# Patient Record
Sex: FEMALE | Race: WHITE | NOT HISPANIC OR LATINO | Employment: UNEMPLOYED | URBAN - NONMETROPOLITAN AREA
[De-identification: names, ages, dates, MRNs, and addresses within clinical notes are randomized per-mention and may not be internally consistent; named-entity substitution may affect disease eponyms.]

---

## 2018-11-16 ENCOUNTER — TRANSCRIBE ORDERS (OUTPATIENT)
Dept: ADMINISTRATIVE | Facility: HOSPITAL | Age: 53
End: 2018-11-16

## 2018-11-16 DIAGNOSIS — Z12.39 SCREENING BREAST EXAMINATION: Primary | ICD-10-CM

## 2018-11-28 ENCOUNTER — TELEPHONE (OUTPATIENT)
Dept: SURGERY | Facility: CLINIC | Age: 53
End: 2018-11-28

## 2018-11-30 NOTE — TELEPHONE ENCOUNTER
"I called pt to schedule an open access screening colonoscopy, she stated \"I am not ready to do that right now, I will call you when I am ready, you don't have to call me back.\"  I called Maggi Talamantes's office to inform her regarding my conversation with Ms. Calderon and I will await pt's call.  "

## 2021-01-29 ENCOUNTER — HOSPITAL ENCOUNTER (EMERGENCY)
Facility: HOSPITAL | Age: 56
Discharge: ANOTHER HOSPITAL | End: 2021-01-29
Attending: EMERGENCY MEDICINE | Admitting: EMERGENCY MEDICINE

## 2021-01-29 ENCOUNTER — APPOINTMENT (OUTPATIENT)
Dept: GENERAL RADIOLOGY | Facility: HOSPITAL | Age: 56
End: 2021-01-29

## 2021-01-29 ENCOUNTER — APPOINTMENT (OUTPATIENT)
Dept: CT IMAGING | Facility: HOSPITAL | Age: 56
End: 2021-01-29

## 2021-01-29 ENCOUNTER — HOSPITAL ENCOUNTER (INPATIENT)
Facility: HOSPITAL | Age: 56
LOS: 7 days | Discharge: NURSING, REHAB OR LONG-TERM CARE | End: 2021-02-05
Attending: INTERNAL MEDICINE | Admitting: INTERNAL MEDICINE

## 2021-01-29 VITALS
RESPIRATION RATE: 22 BRPM | OXYGEN SATURATION: 95 % | HEART RATE: 95 BPM | HEIGHT: 62 IN | DIASTOLIC BLOOD PRESSURE: 73 MMHG | SYSTOLIC BLOOD PRESSURE: 143 MMHG | TEMPERATURE: 99.9 F | BODY MASS INDEX: 23.92 KG/M2 | WEIGHT: 130 LBS

## 2021-01-29 DIAGNOSIS — Z74.09 IMPAIRED FUNCTIONAL MOBILITY, BALANCE, GAIT, AND ENDURANCE: ICD-10-CM

## 2021-01-29 DIAGNOSIS — I61.9 HEMORRHAGIC STROKE (HCC): Primary | ICD-10-CM

## 2021-01-29 DIAGNOSIS — R41.841 COGNITIVE COMMUNICATION DEFICIT: ICD-10-CM

## 2021-01-29 DIAGNOSIS — R13.12 OROPHARYNGEAL DYSPHAGIA: ICD-10-CM

## 2021-01-29 DIAGNOSIS — I61.1 NONTRAUMATIC CORTICAL HEMORRHAGE OF CEREBRAL HEMISPHERE, UNSPECIFIED LATERALITY (HCC): Primary | ICD-10-CM

## 2021-01-29 PROBLEM — D72.829 LEUKOCYTOSIS: Status: ACTIVE | Noted: 2021-01-29

## 2021-01-29 LAB
ALBUMIN SERPL-MCNC: 4.1 G/DL (ref 3.5–5.2)
ALBUMIN/GLOB SERPL: 1.1 G/DL
ALP SERPL-CCNC: 101 U/L (ref 39–117)
ALT SERPL W P-5'-P-CCNC: 17 U/L (ref 1–33)
AMPHET+METHAMPHET UR QL: NEGATIVE
AMPHETAMINES UR QL: NEGATIVE
ANION GAP SERPL CALCULATED.3IONS-SCNC: 11.4 MMOL/L (ref 5–15)
APTT PPP: 29.2 SECONDS (ref 24–37)
APTT PPP: 30.5 SECONDS (ref 24.5–37.2)
AST SERPL-CCNC: 19 U/L (ref 1–32)
BARBITURATES UR QL SCN: NEGATIVE
BASOPHILS # BLD AUTO: 0.04 10*3/MM3 (ref 0–0.2)
BASOPHILS NFR BLD AUTO: 0.3 % (ref 0–1.5)
BENZODIAZ UR QL SCN: NEGATIVE
BILIRUB SERPL-MCNC: 0.4 MG/DL (ref 0–1.2)
BUN SERPL-MCNC: 9 MG/DL (ref 6–20)
BUN/CREAT SERPL: 13.4 (ref 7–25)
BUPRENORPHINE SERPL-MCNC: NEGATIVE NG/ML
CALCIUM SPEC-SCNC: 9.7 MG/DL (ref 8.6–10.5)
CANNABINOIDS SERPL QL: NEGATIVE
CHLORIDE SERPL-SCNC: 105 MMOL/L (ref 98–107)
CK SERPL-CCNC: 56 U/L (ref 20–180)
CO2 SERPL-SCNC: 26.6 MMOL/L (ref 22–29)
COCAINE UR QL: NEGATIVE
CREAT SERPL-MCNC: 0.67 MG/DL (ref 0.57–1)
DEPRECATED RDW RBC AUTO: 43 FL (ref 37–54)
EOSINOPHIL # BLD AUTO: 0.02 10*3/MM3 (ref 0–0.4)
EOSINOPHIL NFR BLD AUTO: 0.1 % (ref 0.3–6.2)
ERYTHROCYTE [DISTWIDTH] IN BLOOD BY AUTOMATED COUNT: 13.3 % (ref 12.3–15.4)
ETHANOL BLD-MCNC: <10 MG/DL (ref 0–10)
ETHANOL UR QL: <0.01 %
GFR SERPL CREATININE-BSD FRML MDRD: 91 ML/MIN/1.73
GLOBULIN UR ELPH-MCNC: 3.6 GM/DL
GLUCOSE BLDC GLUCOMTR-MCNC: 111 MG/DL (ref 70–130)
GLUCOSE SERPL-MCNC: 126 MG/DL (ref 65–99)
HCT VFR BLD AUTO: 45.1 % (ref 34–46.6)
HGB BLD-MCNC: 15.3 G/DL (ref 12–15.9)
HOLD SPECIMEN: NORMAL
HOLD SPECIMEN: NORMAL
IMM GRANULOCYTES # BLD AUTO: 0.09 10*3/MM3 (ref 0–0.05)
IMM GRANULOCYTES NFR BLD AUTO: 0.6 % (ref 0–0.5)
INR PPP: 0.97 (ref 0.9–1.1)
INR PPP: 1.12 (ref 0.85–1.16)
LYMPHOCYTES # BLD AUTO: 2.78 10*3/MM3 (ref 0.7–3.1)
LYMPHOCYTES NFR BLD AUTO: 20 % (ref 19.6–45.3)
MAGNESIUM SERPL-MCNC: 2.3 MG/DL (ref 1.6–2.6)
MCH RBC QN AUTO: 29.5 PG (ref 26.6–33)
MCHC RBC AUTO-ENTMCNC: 33.9 G/DL (ref 31.5–35.7)
MCV RBC AUTO: 87.1 FL (ref 79–97)
METHADONE UR QL SCN: NEGATIVE
MONOCYTES # BLD AUTO: 0.72 10*3/MM3 (ref 0.1–0.9)
MONOCYTES NFR BLD AUTO: 5.2 % (ref 5–12)
MYOGLOBIN SERPL-MCNC: 21.8 NG/ML (ref 25–58)
NEUTROPHILS NFR BLD AUTO: 10.27 10*3/MM3 (ref 1.7–7)
NEUTROPHILS NFR BLD AUTO: 73.8 % (ref 42.7–76)
NRBC BLD AUTO-RTO: 0 /100 WBC (ref 0–0.2)
OPIATES UR QL: NEGATIVE
OXYCODONE UR QL SCN: NEGATIVE
PCP UR QL SCN: NEGATIVE
PLATELET # BLD AUTO: 359 10*3/MM3 (ref 140–450)
PMV BLD AUTO: 10.4 FL (ref 6–12)
POTASSIUM SERPL-SCNC: 4.3 MMOL/L (ref 3.5–5.2)
POTASSIUM SERPL-SCNC: 4.4 MMOL/L (ref 3.5–5.2)
PROPOXYPH UR QL: NEGATIVE
PROT SERPL-MCNC: 7.7 G/DL (ref 6–8.5)
PROTHROMBIN TIME: 13.2 SECONDS (ref 12–15.1)
PROTHROMBIN TIME: 14.2 SECONDS (ref 11.5–14)
RBC # BLD AUTO: 5.18 10*6/MM3 (ref 3.77–5.28)
SODIUM SERPL-SCNC: 143 MMOL/L (ref 136–145)
TRICYCLICS UR QL SCN: NEGATIVE
TROPONIN T SERPL-MCNC: <0.01 NG/ML (ref 0–0.03)
WBC # BLD AUTO: 13.92 10*3/MM3 (ref 3.4–10.8)
WHOLE BLOOD HOLD SPECIMEN: NORMAL
WHOLE BLOOD HOLD SPECIMEN: NORMAL

## 2021-01-29 PROCEDURE — 85610 PROTHROMBIN TIME: CPT | Performed by: EMERGENCY MEDICINE

## 2021-01-29 PROCEDURE — 70450 CT HEAD/BRAIN W/O DYE: CPT

## 2021-01-29 PROCEDURE — 82962 GLUCOSE BLOOD TEST: CPT

## 2021-01-29 PROCEDURE — 96365 THER/PROPH/DIAG IV INF INIT: CPT

## 2021-01-29 PROCEDURE — 83874 ASSAY OF MYOGLOBIN: CPT | Performed by: EMERGENCY MEDICINE

## 2021-01-29 PROCEDURE — 93005 ELECTROCARDIOGRAM TRACING: CPT | Performed by: EMERGENCY MEDICINE

## 2021-01-29 PROCEDURE — 82550 ASSAY OF CK (CPK): CPT | Performed by: EMERGENCY MEDICINE

## 2021-01-29 PROCEDURE — 84132 ASSAY OF SERUM POTASSIUM: CPT | Performed by: INTERNAL MEDICINE

## 2021-01-29 PROCEDURE — 70496 CT ANGIOGRAPHY HEAD: CPT

## 2021-01-29 PROCEDURE — 83735 ASSAY OF MAGNESIUM: CPT | Performed by: INTERNAL MEDICINE

## 2021-01-29 PROCEDURE — 71045 X-RAY EXAM CHEST 1 VIEW: CPT

## 2021-01-29 PROCEDURE — 80053 COMPREHEN METABOLIC PANEL: CPT | Performed by: EMERGENCY MEDICINE

## 2021-01-29 PROCEDURE — 82077 ASSAY SPEC XCP UR&BREATH IA: CPT | Performed by: EMERGENCY MEDICINE

## 2021-01-29 PROCEDURE — 25010000002 DEXAMETHASONE PER 1 MG: Performed by: PHYSICIAN ASSISTANT

## 2021-01-29 PROCEDURE — 85025 COMPLETE CBC W/AUTO DIFF WBC: CPT | Performed by: EMERGENCY MEDICINE

## 2021-01-29 PROCEDURE — 84484 ASSAY OF TROPONIN QUANT: CPT | Performed by: EMERGENCY MEDICINE

## 2021-01-29 PROCEDURE — 99284 EMERGENCY DEPT VISIT MOD MDM: CPT

## 2021-01-29 PROCEDURE — 85730 THROMBOPLASTIN TIME PARTIAL: CPT | Performed by: INTERNAL MEDICINE

## 2021-01-29 PROCEDURE — 70498 CT ANGIOGRAPHY NECK: CPT

## 2021-01-29 PROCEDURE — 80306 DRUG TEST PRSMV INSTRMNT: CPT | Performed by: INTERNAL MEDICINE

## 2021-01-29 PROCEDURE — 85610 PROTHROMBIN TIME: CPT | Performed by: INTERNAL MEDICINE

## 2021-01-29 PROCEDURE — 85730 THROMBOPLASTIN TIME PARTIAL: CPT | Performed by: EMERGENCY MEDICINE

## 2021-01-29 PROCEDURE — 99222 1ST HOSP IP/OBS MODERATE 55: CPT | Performed by: PHYSICIAN ASSISTANT

## 2021-01-29 PROCEDURE — 25010000003 LEVETIRACETAM IN NACL 0.75% 1000 MG/100ML SOLUTION: Performed by: PHYSICIAN ASSISTANT

## 2021-01-29 PROCEDURE — 0 IOPAMIDOL PER 1 ML: Performed by: INTERNAL MEDICINE

## 2021-01-29 PROCEDURE — 99291 CRITICAL CARE FIRST HOUR: CPT | Performed by: INTERNAL MEDICINE

## 2021-01-29 RX ORDER — FAMOTIDINE 10 MG/ML
20 INJECTION, SOLUTION INTRAVENOUS EVERY 12 HOURS SCHEDULED
Status: DISCONTINUED | OUTPATIENT
Start: 2021-01-29 | End: 2021-02-03

## 2021-01-29 RX ORDER — MAGNESIUM SULFATE HEPTAHYDRATE 40 MG/ML
2 INJECTION, SOLUTION INTRAVENOUS AS NEEDED
Status: DISCONTINUED | OUTPATIENT
Start: 2021-01-29 | End: 2021-02-05 | Stop reason: HOSPADM

## 2021-01-29 RX ORDER — DEXAMETHASONE SODIUM PHOSPHATE 4 MG/ML
4 INJECTION, SOLUTION INTRA-ARTICULAR; INTRALESIONAL; INTRAMUSCULAR; INTRAVENOUS; SOFT TISSUE EVERY 6 HOURS SCHEDULED
Status: DISCONTINUED | OUTPATIENT
Start: 2021-01-29 | End: 2021-01-31

## 2021-01-29 RX ORDER — MAGNESIUM SULFATE HEPTAHYDRATE 40 MG/ML
4 INJECTION, SOLUTION INTRAVENOUS AS NEEDED
Status: DISCONTINUED | OUTPATIENT
Start: 2021-01-29 | End: 2021-02-05 | Stop reason: HOSPADM

## 2021-01-29 RX ORDER — SODIUM CHLORIDE 0.9 % (FLUSH) 0.9 %
10 SYRINGE (ML) INJECTION AS NEEDED
Status: DISCONTINUED | OUTPATIENT
Start: 2021-01-29 | End: 2021-01-29 | Stop reason: HOSPADM

## 2021-01-29 RX ORDER — LEVETIRACETAM 10 MG/ML
1000 INJECTION INTRAVASCULAR EVERY 12 HOURS SCHEDULED
Status: DISCONTINUED | OUTPATIENT
Start: 2021-01-29 | End: 2021-01-31

## 2021-01-29 RX ORDER — SODIUM CHLORIDE 0.9 % (FLUSH) 0.9 %
10 SYRINGE (ML) INJECTION EVERY 12 HOURS SCHEDULED
Status: CANCELLED | OUTPATIENT
Start: 2021-01-29

## 2021-01-29 RX ORDER — POTASSIUM CHLORIDE 7.45 MG/ML
10 INJECTION INTRAVENOUS
Status: DISCONTINUED | OUTPATIENT
Start: 2021-01-29 | End: 2021-02-05 | Stop reason: HOSPADM

## 2021-01-29 RX ORDER — SODIUM CHLORIDE 0.9 % (FLUSH) 0.9 %
10 SYRINGE (ML) INJECTION AS NEEDED
Status: CANCELLED | OUTPATIENT
Start: 2021-01-29

## 2021-01-29 RX ADMIN — IOPAMIDOL 75 ML: 755 INJECTION, SOLUTION INTRAVENOUS at 21:45

## 2021-01-29 RX ADMIN — NICARDIPINE HYDROCHLORIDE 5 MG/HR: 0.1 INJECTION, SOLUTION INTRAVENOUS at 21:00

## 2021-01-29 RX ADMIN — NICARDIPINE HYDROCHLORIDE 5 MG/HR: 0.1 INJECTION, SOLUTION INTRAVENOUS at 18:40

## 2021-01-29 RX ADMIN — DEXAMETHASONE SODIUM PHOSPHATE 4 MG: 4 INJECTION, SOLUTION INTRA-ARTICULAR; INTRALESIONAL; INTRAMUSCULAR; INTRAVENOUS; SOFT TISSUE at 23:45

## 2021-01-29 RX ADMIN — FAMOTIDINE 20 MG: 10 INJECTION INTRAVENOUS at 21:25

## 2021-01-29 RX ADMIN — LEVETIRACETAM 1000 MG: 10 INJECTION INTRAVASCULAR at 23:45

## 2021-01-30 ENCOUNTER — APPOINTMENT (OUTPATIENT)
Dept: MRI IMAGING | Facility: HOSPITAL | Age: 56
End: 2021-01-30

## 2021-01-30 ENCOUNTER — APPOINTMENT (OUTPATIENT)
Dept: CT IMAGING | Facility: HOSPITAL | Age: 56
End: 2021-01-30

## 2021-01-30 PROBLEM — U07.1 COVID-19 VIRUS DETECTED: Status: ACTIVE | Noted: 2021-01-30

## 2021-01-30 LAB
CHOLEST SERPL-MCNC: 210 MG/DL (ref 0–200)
GLUCOSE BLDC GLUCOMTR-MCNC: 126 MG/DL (ref 70–130)
GLUCOSE BLDC GLUCOMTR-MCNC: 137 MG/DL (ref 70–130)
GLUCOSE BLDC GLUCOMTR-MCNC: 147 MG/DL (ref 70–130)
HBA1C MFR BLD: 6 % (ref 4.8–5.6)
HDLC SERPL-MCNC: 58 MG/DL (ref 40–60)
LDLC SERPL CALC-MCNC: 138 MG/DL (ref 0–100)
LDLC/HDLC SERPL: 2.36 {RATIO}
MAGNESIUM SERPL-MCNC: 2.5 MG/DL (ref 1.6–2.6)
POTASSIUM SERPL-SCNC: 3.9 MMOL/L (ref 3.5–5.2)
SARS-COV-2 RNA RESP QL NAA+PROBE: DETECTED
TRIGL SERPL-MCNC: 76 MG/DL (ref 0–150)
VLDLC SERPL-MCNC: 14 MG/DL (ref 5–40)

## 2021-01-30 PROCEDURE — 70553 MRI BRAIN STEM W/O & W/DYE: CPT

## 2021-01-30 PROCEDURE — 80061 LIPID PANEL: CPT | Performed by: INTERNAL MEDICINE

## 2021-01-30 PROCEDURE — 71250 CT THORAX DX C-: CPT

## 2021-01-30 PROCEDURE — 97110 THERAPEUTIC EXERCISES: CPT

## 2021-01-30 PROCEDURE — 74150 CT ABDOMEN W/O CONTRAST: CPT

## 2021-01-30 PROCEDURE — 25010000002 DEXAMETHASONE PER 1 MG: Performed by: PHYSICIAN ASSISTANT

## 2021-01-30 PROCEDURE — 25010000003 LEVETIRACETAM IN NACL 0.75% 1000 MG/100ML SOLUTION: Performed by: PHYSICIAN ASSISTANT

## 2021-01-30 PROCEDURE — 83036 HEMOGLOBIN GLYCOSYLATED A1C: CPT | Performed by: INTERNAL MEDICINE

## 2021-01-30 PROCEDURE — 97166 OT EVAL MOD COMPLEX 45 MIN: CPT

## 2021-01-30 PROCEDURE — 83735 ASSAY OF MAGNESIUM: CPT | Performed by: INTERNAL MEDICINE

## 2021-01-30 PROCEDURE — 82962 GLUCOSE BLOOD TEST: CPT

## 2021-01-30 PROCEDURE — 84132 ASSAY OF SERUM POTASSIUM: CPT | Performed by: INTERNAL MEDICINE

## 2021-01-30 PROCEDURE — 99231 SBSQ HOSP IP/OBS SF/LOW 25: CPT | Performed by: PHYSICIAN ASSISTANT

## 2021-01-30 PROCEDURE — U0003 INFECTIOUS AGENT DETECTION BY NUCLEIC ACID (DNA OR RNA); SEVERE ACUTE RESPIRATORY SYNDROME CORONAVIRUS 2 (SARS-COV-2) (CORONAVIRUS DISEASE [COVID-19]), AMPLIFIED PROBE TECHNIQUE, MAKING USE OF HIGH THROUGHPUT TECHNOLOGIES AS DESCRIBED BY CMS-2020-01-R: HCPCS | Performed by: INTERNAL MEDICINE

## 2021-01-30 PROCEDURE — 70450 CT HEAD/BRAIN W/O DYE: CPT

## 2021-01-30 PROCEDURE — 97162 PT EVAL MOD COMPLEX 30 MIN: CPT

## 2021-01-30 PROCEDURE — 99232 SBSQ HOSP IP/OBS MODERATE 35: CPT | Performed by: INTERNAL MEDICINE

## 2021-01-30 RX ORDER — SODIUM CHLORIDE 450 MG/100ML
75 INJECTION, SOLUTION INTRAVENOUS CONTINUOUS
Status: DISCONTINUED | OUTPATIENT
Start: 2021-01-30 | End: 2021-02-02

## 2021-01-30 RX ORDER — ACETAMINOPHEN 650 MG/1
650 SUPPOSITORY RECTAL EVERY 4 HOURS PRN
Status: DISCONTINUED | OUTPATIENT
Start: 2021-01-30 | End: 2021-02-05 | Stop reason: HOSPADM

## 2021-01-30 RX ADMIN — DEXAMETHASONE SODIUM PHOSPHATE 4 MG: 4 INJECTION, SOLUTION INTRA-ARTICULAR; INTRALESIONAL; INTRAMUSCULAR; INTRAVENOUS; SOFT TISSUE at 17:37

## 2021-01-30 RX ADMIN — LEVETIRACETAM 1000 MG: 10 INJECTION INTRAVASCULAR at 08:45

## 2021-01-30 RX ADMIN — NICARDIPINE HYDROCHLORIDE 5 MG/HR: 0.1 INJECTION, SOLUTION INTRAVENOUS at 01:08

## 2021-01-30 RX ADMIN — SODIUM CHLORIDE 75 ML/HR: 4.5 INJECTION, SOLUTION INTRAVENOUS at 17:44

## 2021-01-30 RX ADMIN — LEVETIRACETAM 1000 MG: 10 INJECTION INTRAVASCULAR at 21:20

## 2021-01-30 RX ADMIN — FAMOTIDINE 20 MG: 10 INJECTION INTRAVENOUS at 21:21

## 2021-01-30 RX ADMIN — DEXAMETHASONE SODIUM PHOSPHATE 4 MG: 4 INJECTION, SOLUTION INTRA-ARTICULAR; INTRALESIONAL; INTRAMUSCULAR; INTRAVENOUS; SOFT TISSUE at 12:43

## 2021-01-30 RX ADMIN — NICARDIPINE HYDROCHLORIDE 2.5 MG/HR: 0.1 INJECTION, SOLUTION INTRAVENOUS at 21:20

## 2021-01-30 RX ADMIN — DEXAMETHASONE SODIUM PHOSPHATE 4 MG: 4 INJECTION, SOLUTION INTRA-ARTICULAR; INTRALESIONAL; INTRAMUSCULAR; INTRAVENOUS; SOFT TISSUE at 05:45

## 2021-01-30 RX ADMIN — FAMOTIDINE 20 MG: 10 INJECTION INTRAVENOUS at 08:45

## 2021-01-30 RX ADMIN — NICARDIPINE HYDROCHLORIDE 12.5 MG/HR: 0.1 INJECTION, SOLUTION INTRAVENOUS at 02:27

## 2021-01-30 RX ADMIN — ACETAMINOPHEN 650 MG: 650 SUPPOSITORY RECTAL at 17:37

## 2021-01-30 RX ADMIN — NICARDIPINE HYDROCHLORIDE 7.5 MG/HR: 0.1 INJECTION, SOLUTION INTRAVENOUS at 05:45

## 2021-01-31 ENCOUNTER — APPOINTMENT (OUTPATIENT)
Dept: MRI IMAGING | Facility: HOSPITAL | Age: 56
End: 2021-01-31

## 2021-01-31 LAB
ALBUMIN SERPL-MCNC: 4 G/DL (ref 3.5–5.2)
ANION GAP SERPL CALCULATED.3IONS-SCNC: 9 MMOL/L (ref 5–15)
BASOPHILS # BLD AUTO: 0.02 10*3/MM3 (ref 0–0.2)
BASOPHILS NFR BLD AUTO: 0.1 % (ref 0–1.5)
BUN SERPL-MCNC: 23 MG/DL (ref 6–20)
BUN/CREAT SERPL: 40.4 (ref 7–25)
CALCIUM SPEC-SCNC: 9.4 MG/DL (ref 8.6–10.5)
CHLORIDE SERPL-SCNC: 106 MMOL/L (ref 98–107)
CO2 SERPL-SCNC: 24 MMOL/L (ref 22–29)
CREAT SERPL-MCNC: 0.57 MG/DL (ref 0.57–1)
DEPRECATED RDW RBC AUTO: 45.1 FL (ref 37–54)
EOSINOPHIL # BLD AUTO: 0 10*3/MM3 (ref 0–0.4)
EOSINOPHIL NFR BLD AUTO: 0 % (ref 0.3–6.2)
ERYTHROCYTE [DISTWIDTH] IN BLOOD BY AUTOMATED COUNT: 13.5 % (ref 12.3–15.4)
GFR SERPL CREATININE-BSD FRML MDRD: 110 ML/MIN/1.73
GLUCOSE BLDC GLUCOMTR-MCNC: 119 MG/DL (ref 70–130)
GLUCOSE BLDC GLUCOMTR-MCNC: 128 MG/DL (ref 70–130)
GLUCOSE SERPL-MCNC: 131 MG/DL (ref 65–99)
HCT VFR BLD AUTO: 49 % (ref 34–46.6)
HGB BLD-MCNC: 15.9 G/DL (ref 12–15.9)
IMM GRANULOCYTES # BLD AUTO: 0.14 10*3/MM3 (ref 0–0.05)
IMM GRANULOCYTES NFR BLD AUTO: 1 % (ref 0–0.5)
LYMPHOCYTES # BLD AUTO: 2.3 10*3/MM3 (ref 0.7–3.1)
LYMPHOCYTES NFR BLD AUTO: 16.6 % (ref 19.6–45.3)
MAGNESIUM SERPL-MCNC: 2.4 MG/DL (ref 1.6–2.6)
MCH RBC QN AUTO: 29.4 PG (ref 26.6–33)
MCHC RBC AUTO-ENTMCNC: 32.4 G/DL (ref 31.5–35.7)
MCV RBC AUTO: 90.7 FL (ref 79–97)
MONOCYTES # BLD AUTO: 0.5 10*3/MM3 (ref 0.1–0.9)
MONOCYTES NFR BLD AUTO: 3.6 % (ref 5–12)
NEUTROPHILS NFR BLD AUTO: 10.9 10*3/MM3 (ref 1.7–7)
NEUTROPHILS NFR BLD AUTO: 78.7 % (ref 42.7–76)
NRBC BLD AUTO-RTO: 0 /100 WBC (ref 0–0.2)
PHOSPHATE SERPL-MCNC: 3.2 MG/DL (ref 2.5–4.5)
PLATELET # BLD AUTO: 329 10*3/MM3 (ref 140–450)
PMV BLD AUTO: 10.4 FL (ref 6–12)
POTASSIUM SERPL-SCNC: 3.7 MMOL/L (ref 3.5–5.2)
RBC # BLD AUTO: 5.4 10*6/MM3 (ref 3.77–5.28)
SODIUM SERPL-SCNC: 139 MMOL/L (ref 136–145)
WBC # BLD AUTO: 13.86 10*3/MM3 (ref 3.4–10.8)

## 2021-01-31 PROCEDURE — 25010000002 LEVETIRACETAM IN NACL 0.82% 500 MG/100ML SOLUTION: Performed by: NEUROLOGICAL SURGERY

## 2021-01-31 PROCEDURE — 82962 GLUCOSE BLOOD TEST: CPT

## 2021-01-31 PROCEDURE — 97530 THERAPEUTIC ACTIVITIES: CPT

## 2021-01-31 PROCEDURE — A9577 INJ MULTIHANCE: HCPCS | Performed by: INTERNAL MEDICINE

## 2021-01-31 PROCEDURE — 99232 SBSQ HOSP IP/OBS MODERATE 35: CPT | Performed by: NEUROLOGICAL SURGERY

## 2021-01-31 PROCEDURE — 0 GADOBENATE DIMEGLUMINE 529 MG/ML SOLUTION: Performed by: INTERNAL MEDICINE

## 2021-01-31 PROCEDURE — 97116 GAIT TRAINING THERAPY: CPT

## 2021-01-31 PROCEDURE — 25010000002 DEXAMETHASONE PER 1 MG: Performed by: PHYSICIAN ASSISTANT

## 2021-01-31 PROCEDURE — 80069 RENAL FUNCTION PANEL: CPT | Performed by: INTERNAL MEDICINE

## 2021-01-31 PROCEDURE — 85025 COMPLETE CBC W/AUTO DIFF WBC: CPT | Performed by: INTERNAL MEDICINE

## 2021-01-31 PROCEDURE — 92610 EVALUATE SWALLOWING FUNCTION: CPT

## 2021-01-31 PROCEDURE — 92523 SPEECH SOUND LANG COMPREHEN: CPT

## 2021-01-31 PROCEDURE — 25010000003 LEVETIRACETAM IN NACL 0.75% 1000 MG/100ML SOLUTION: Performed by: PHYSICIAN ASSISTANT

## 2021-01-31 PROCEDURE — 83735 ASSAY OF MAGNESIUM: CPT | Performed by: INTERNAL MEDICINE

## 2021-01-31 PROCEDURE — 99232 SBSQ HOSP IP/OBS MODERATE 35: CPT | Performed by: INTERNAL MEDICINE

## 2021-01-31 PROCEDURE — 70544 MR ANGIOGRAPHY HEAD W/O DYE: CPT

## 2021-01-31 RX ORDER — LEVETIRACETAM 5 MG/ML
500 INJECTION INTRAVASCULAR EVERY 12 HOURS SCHEDULED
Status: DISCONTINUED | OUTPATIENT
Start: 2021-01-31 | End: 2021-02-03

## 2021-01-31 RX ADMIN — LEVETIRACETAM 1000 MG: 10 INJECTION INTRAVASCULAR at 08:11

## 2021-01-31 RX ADMIN — SODIUM CHLORIDE 75 ML/HR: 4.5 INJECTION, SOLUTION INTRAVENOUS at 06:09

## 2021-01-31 RX ADMIN — FAMOTIDINE 20 MG: 10 INJECTION INTRAVENOUS at 20:13

## 2021-01-31 RX ADMIN — LEVETIRACETAM 500 MG: 5 INJECTION INTRAVASCULAR at 20:13

## 2021-01-31 RX ADMIN — GADOBENATE DIMEGLUMINE 11 ML: 529 INJECTION, SOLUTION INTRAVENOUS at 02:15

## 2021-01-31 RX ADMIN — DEXAMETHASONE SODIUM PHOSPHATE 4 MG: 4 INJECTION, SOLUTION INTRA-ARTICULAR; INTRALESIONAL; INTRAMUSCULAR; INTRAVENOUS; SOFT TISSUE at 06:09

## 2021-01-31 RX ADMIN — NICARDIPINE HYDROCHLORIDE 5 MG/HR: 0.1 INJECTION, SOLUTION INTRAVENOUS at 02:03

## 2021-01-31 RX ADMIN — FAMOTIDINE 20 MG: 10 INJECTION INTRAVENOUS at 08:11

## 2021-01-31 RX ADMIN — NICARDIPINE HYDROCHLORIDE 5 MG/HR: 0.1 INJECTION, SOLUTION INTRAVENOUS at 04:44

## 2021-01-31 RX ADMIN — SODIUM CHLORIDE 75 ML/HR: 4.5 INJECTION, SOLUTION INTRAVENOUS at 20:14

## 2021-01-31 RX ADMIN — DEXAMETHASONE SODIUM PHOSPHATE 4 MG: 4 INJECTION, SOLUTION INTRA-ARTICULAR; INTRALESIONAL; INTRAMUSCULAR; INTRAVENOUS; SOFT TISSUE at 00:00

## 2021-02-01 ENCOUNTER — APPOINTMENT (OUTPATIENT)
Dept: GENERAL RADIOLOGY | Facility: HOSPITAL | Age: 56
End: 2021-02-01

## 2021-02-01 LAB
ALBUMIN SERPL-MCNC: 3.5 G/DL (ref 3.5–5.2)
ANION GAP SERPL CALCULATED.3IONS-SCNC: 9 MMOL/L (ref 5–15)
BASOPHILS # BLD AUTO: 0.02 10*3/MM3 (ref 0–0.2)
BASOPHILS NFR BLD AUTO: 0.1 % (ref 0–1.5)
BUN SERPL-MCNC: 21 MG/DL (ref 6–20)
BUN/CREAT SERPL: 33.3 (ref 7–25)
CALCIUM SPEC-SCNC: 8.7 MG/DL (ref 8.6–10.5)
CHLORIDE SERPL-SCNC: 106 MMOL/L (ref 98–107)
CO2 SERPL-SCNC: 23 MMOL/L (ref 22–29)
CREAT SERPL-MCNC: 0.63 MG/DL (ref 0.57–1)
CRP SERPL-MCNC: <0.3 MG/DL (ref 0–0.5)
D DIMER PPP FEU-MCNC: 1.18 MCGFEU/ML (ref 0–0.56)
DEPRECATED RDW RBC AUTO: 44.1 FL (ref 37–54)
EOSINOPHIL # BLD AUTO: 0 10*3/MM3 (ref 0–0.4)
EOSINOPHIL NFR BLD AUTO: 0 % (ref 0.3–6.2)
ERYTHROCYTE [DISTWIDTH] IN BLOOD BY AUTOMATED COUNT: 13.5 % (ref 12.3–15.4)
FERRITIN SERPL-MCNC: 245.3 NG/ML (ref 13–150)
FIBRINOGEN PPP-MCNC: 315 MG/DL (ref 215–430)
GFR SERPL CREATININE-BSD FRML MDRD: 98 ML/MIN/1.73
GLUCOSE SERPL-MCNC: 93 MG/DL (ref 65–99)
HCT VFR BLD AUTO: 44 % (ref 34–46.6)
HGB BLD-MCNC: 14.4 G/DL (ref 12–15.9)
IMM GRANULOCYTES # BLD AUTO: 0.13 10*3/MM3 (ref 0–0.05)
IMM GRANULOCYTES NFR BLD AUTO: 0.8 % (ref 0–0.5)
LDH SERPL-CCNC: 251 U/L (ref 135–214)
LYMPHOCYTES # BLD AUTO: 2.51 10*3/MM3 (ref 0.7–3.1)
LYMPHOCYTES NFR BLD AUTO: 15.4 % (ref 19.6–45.3)
MAGNESIUM SERPL-MCNC: 2.3 MG/DL (ref 1.6–2.6)
MCH RBC QN AUTO: 29.2 PG (ref 26.6–33)
MCHC RBC AUTO-ENTMCNC: 32.7 G/DL (ref 31.5–35.7)
MCV RBC AUTO: 89.2 FL (ref 79–97)
MONOCYTES # BLD AUTO: 1.12 10*3/MM3 (ref 0.1–0.9)
MONOCYTES NFR BLD AUTO: 6.9 % (ref 5–12)
NEUTROPHILS NFR BLD AUTO: 12.48 10*3/MM3 (ref 1.7–7)
NEUTROPHILS NFR BLD AUTO: 76.8 % (ref 42.7–76)
NRBC BLD AUTO-RTO: 0 /100 WBC (ref 0–0.2)
PHOSPHATE SERPL-MCNC: 3.3 MG/DL (ref 2.5–4.5)
PLATELET # BLD AUTO: 290 10*3/MM3 (ref 140–450)
PMV BLD AUTO: 10.5 FL (ref 6–12)
POTASSIUM SERPL-SCNC: 4.2 MMOL/L (ref 3.5–5.2)
PROCALCITONIN SERPL-MCNC: 0.03 NG/ML (ref 0–0.25)
RBC # BLD AUTO: 4.93 10*6/MM3 (ref 3.77–5.28)
SODIUM SERPL-SCNC: 138 MMOL/L (ref 136–145)
WBC # BLD AUTO: 16.26 10*3/MM3 (ref 3.4–10.8)

## 2021-02-01 PROCEDURE — 83615 LACTATE (LD) (LDH) ENZYME: CPT | Performed by: INTERNAL MEDICINE

## 2021-02-01 PROCEDURE — 84145 PROCALCITONIN (PCT): CPT | Performed by: INTERNAL MEDICINE

## 2021-02-01 PROCEDURE — 85025 COMPLETE CBC W/AUTO DIFF WBC: CPT | Performed by: INTERNAL MEDICINE

## 2021-02-01 PROCEDURE — 74230 X-RAY XM SWLNG FUNCJ C+: CPT

## 2021-02-01 PROCEDURE — 99233 SBSQ HOSP IP/OBS HIGH 50: CPT | Performed by: INTERNAL MEDICINE

## 2021-02-01 PROCEDURE — 85384 FIBRINOGEN ACTIVITY: CPT | Performed by: INTERNAL MEDICINE

## 2021-02-01 PROCEDURE — 86140 C-REACTIVE PROTEIN: CPT | Performed by: INTERNAL MEDICINE

## 2021-02-01 PROCEDURE — 97530 THERAPEUTIC ACTIVITIES: CPT

## 2021-02-01 PROCEDURE — 85379 FIBRIN DEGRADATION QUANT: CPT | Performed by: INTERNAL MEDICINE

## 2021-02-01 PROCEDURE — 97110 THERAPEUTIC EXERCISES: CPT

## 2021-02-01 PROCEDURE — 83735 ASSAY OF MAGNESIUM: CPT | Performed by: INTERNAL MEDICINE

## 2021-02-01 PROCEDURE — 71045 X-RAY EXAM CHEST 1 VIEW: CPT

## 2021-02-01 PROCEDURE — 92611 MOTION FLUOROSCOPY/SWALLOW: CPT | Performed by: SPEECH-LANGUAGE PATHOLOGIST

## 2021-02-01 PROCEDURE — 82728 ASSAY OF FERRITIN: CPT | Performed by: INTERNAL MEDICINE

## 2021-02-01 PROCEDURE — 97116 GAIT TRAINING THERAPY: CPT

## 2021-02-01 PROCEDURE — 80069 RENAL FUNCTION PANEL: CPT | Performed by: INTERNAL MEDICINE

## 2021-02-01 PROCEDURE — 97535 SELF CARE MNGMENT TRAINING: CPT

## 2021-02-01 PROCEDURE — 25010000002 LEVETIRACETAM IN NACL 0.82% 500 MG/100ML SOLUTION: Performed by: NEUROLOGICAL SURGERY

## 2021-02-01 RX ORDER — AMLODIPINE BESYLATE 5 MG/1
5 TABLET ORAL
Status: DISCONTINUED | OUTPATIENT
Start: 2021-02-02 | End: 2021-02-02

## 2021-02-01 RX ADMIN — FAMOTIDINE 20 MG: 10 INJECTION INTRAVENOUS at 08:11

## 2021-02-01 RX ADMIN — SODIUM CHLORIDE 75 ML/HR: 4.5 INJECTION, SOLUTION INTRAVENOUS at 09:00

## 2021-02-01 RX ADMIN — LEVETIRACETAM 500 MG: 5 INJECTION INTRAVASCULAR at 08:10

## 2021-02-01 RX ADMIN — BARIUM SULFATE 20 ML: 400 PASTE ORAL at 14:50

## 2021-02-01 RX ADMIN — NICARDIPINE HYDROCHLORIDE 2.5 MG/HR: 0.1 INJECTION, SOLUTION INTRAVENOUS at 14:50

## 2021-02-01 RX ADMIN — LEVETIRACETAM 500 MG: 5 INJECTION INTRAVASCULAR at 20:34

## 2021-02-01 RX ADMIN — FAMOTIDINE 20 MG: 10 INJECTION INTRAVENOUS at 20:34

## 2021-02-01 RX ADMIN — BARIUM SULFATE 100 ML: 0.81 POWDER, FOR SUSPENSION ORAL at 14:50

## 2021-02-01 RX ADMIN — BARIUM SULFATE 50 ML: 400 SUSPENSION ORAL at 14:50

## 2021-02-01 RX ADMIN — NICARDIPINE HYDROCHLORIDE 2.5 MG/HR: 0.1 INJECTION, SOLUTION INTRAVENOUS at 08:11

## 2021-02-01 RX ADMIN — BARIUM SULFATE 10 ML: 400 SUSPENSION ORAL at 14:49

## 2021-02-01 NOTE — PLAN OF CARE
Goal Outcome Evaluation:  Plan of Care Reviewed With: patient  Progress: improving  Outcome Summary: Neuro status remains unchanged. NIHSS 8. Up to BSC x3 with the assist of 1. VSS. Cardene gtt remains off. ST to perform advanced bedside dysphagia screening today. No other s/s of distress. Patient updated on plan of care. Will continue to monitor.

## 2021-02-01 NOTE — CONSULTS
Patient does not qualify for the follow up stroke class based on the exclusion criteria of no history of diabetes per chart review. A1c of 6% does fall within the parameters of pre diabetes. Will provide pre diabetes education via phone due to COVID when cognitively stable.

## 2021-02-01 NOTE — PROGRESS NOTES
Discharge Planning Assessment  [facility]     Patient Name: Arlene Calderon  MRN: [id number]  Today's Date: 2/1/2021    Admit Date: 1/29/2021    Discharge Needs Assessment     Row Name 02/01/21 1131       Living Environment    Lives With  alone    Current Living Arrangements  home/apartment/condo Lives in [facility]    Primary Care Provided by  self    Provides Primary Care For  no one    Family Caregiver if Needed  friend(s)    Family Caregiver Names  Mercedez Gore-friend    Quality of Family Relationships  helpful;involved;supportive    Able to Return to Prior Arrangements  yes       Resource/Environmental Concerns    Resource/Environmental Concerns  none    Transportation Concerns  car, none       Transition Planning    Patient/Family Anticipates Transition to  home with help/services;inpatient rehabilitation facility    Patient/Family Anticipated Services at Transition  ;home health care;skilled nursing    Transportation Anticipated  family or friend will provide       Discharge Needs Assessment    Current Outpatient/Agency/Support Group  skilled nursing facility;homecare agency    Equipment Currently Used at Home  none    Concerns to be Addressed  discharge planning    Anticipated Changes Related to Illness  inability to care for self    Equipment Needed After Discharge  other (see comments) TBD    Discharge Facility/Level of Care Needs  nursing facility, skilled;home with home health    Current Discharge Risk  lives alone        Discharge Plan     Row Name 02/01/21 1132       Plan    Plan  Home with friend vs Rehab    Patient/Family in Agreement with Plan  yes    Plan Comments  Pt confused. Called and spoke to pt's friend Mercedez Adler) for IDP since pt's daughter lives in [place]. Pt lives in [facility] and is independent of ADL's. She does not use DME or HH. Per her friend, pt works out multiple days during the week. Depending on pt's needs, pt's friend and her daughter are agreeable  to rehab if needed and if not needed, pt will come to stay with her friend at d/c. Her friend will call CM with insurance info. CM will follow progress and d/c needs. Jannet ext. [phone]: Per pt's friend, she is unemployed currently. Spoke to [facility] and they will reach out to patients friend to verify since pt is confused and daughter is in [place]. AB        Continued Care and Services - Admitted Since 1/29/2021    Coordination has not been started for this encounter.         Demographic Summary     Row Name 02/01/21 1046       General Information    Referral Source  admission list    Preferred Language  English     Used During This Interaction  no    General Information Comments  PCP is Maggi Talamantes.       Contact Information    Permission Granted to Share Info With  ;family/designee Pt's daughter is in [place] and requests to call pt's friend Mercedez for info.        Functional Status     Row Name 02/01/21 1129       Functional Status    Usual Activity Tolerance  good    Current Activity Tolerance  good       Functional Status, IADL    Medications  independent    Meal Preparation  independent    Housekeeping  independent    Laundry  independent    Shopping  independent       Employment/    Employment Status  unemployed    Employment/ Comments  Insurance isn't listed-called friend for verification        Psychosocial    No documentation.       Abuse/Neglect    No documentation.       Legal    No documentation.       Substance Abuse    No documentation.       Patient Forms    No documentation.           Jannet Small, AMISH

## 2021-02-01 NOTE — PLAN OF CARE
Goal Outcome Evaluation:  Plan of Care Reviewed With: patient  Progress: improving  Outcome Summary: Pt demonstrated increased indep with bed mobility. Progressed forward ambulation distance to 70 total ft with min A (requiring constant vc's to attend to R). Continues to be limited by R inattention, impulsivity, and aphasia, but very cooperative throughout, maintaining stable vitals. Will cont PT POC.

## 2021-02-01 NOTE — THERAPY TREATMENT NOTE
Patient Name: Arlene Calderon  : 1965    MRN: [id number]                              Today's Date: 2021       Admit Date: 2021    Visit Dx:     ICD-10-CM ICD-9-CM   1. Impaired functional mobility, balance, gait, and endurance  Z74.09 V49.89   2. Dysphagia, unspecified type  R13.10 787.20   3. Cognitive communication deficit  R41.841 799.52     Patient Active Problem List   Diagnosis   • Acute left frontal ICH 2021 (CMS/HCC)   • Leukocytosis   • COVID-19 virus detected at admission     History reviewed. No pertinent past medical history.  History reviewed. No pertinent surgical history.  General Information     Row Name 21 1204          OT Time and Intention    Document Type  therapy note (daily note)  -CL     Mode of Treatment  occupational therapy  -CL     Row Name 21 1204          General Information    Existing Precautions/Restrictions  fall;other (see comments) R sided weakness/neglect, aphasia  -CL     Row Name 21 1204          Cognition    Orientation Status (Cognition)  oriented to;person;place;situation;disoriented to;time  -CL     Row Name 21 1204          Safety Issues, Functional Mobility    Impairments Affecting Function (Mobility)  balance;cognition;endurance/activity tolerance;strength;coordination;grasp;visual/perceptual;postural/trunk control;motor control  -CL       User Key  (r) = Recorded By, (t) = Taken By, (c) = Cosigned By    Initials Name Provider Type    CL Jade Neely OT Occupational Therapist          Mobility/ADL's     Row Name 21 1205          Bed Mobility    Bed Mobility  sit-supine  -CL     Sit-Supine [place] (Bed Mobility)  minimum assist (75% patient effort);verbal cues  -CL     Assistive Device (Bed Mobility)  bed rails;head of bed elevated  -CL     Row Name 21 1205          Transfers    Transfers  sit-stand transfer;toilet transfer  -CL     Sit-Stand [place] (Transfers)  minimum assist (75% patient effort);verbal  cues  -CL     [place] Level (Toilet Transfer)  minimum assist (75% patient effort);verbal cues  -CL     Assistive Device (Toilet Transfer)  commode, bedside without drop arms  -CL     Row Name 02/01/21 1205          Toilet Transfer    Type (Toilet Transfer)  stand pivot/stand step  -CL     Row Name 02/01/21 1205          Activities of Daily Living    BADL Assessment/Intervention  lower body dressing;upper body dressing;toileting  -CL     Row Name 02/01/21 1205          Lower Body Dressing Assessment/Training    [place] Level (Lower Body Dressing)  don;socks;dependent (less than 25% patient effort)  -CL     Position (Lower Body Dressing)  supported sitting  -CL     Row Name 02/01/21 1205          Upper Body Dressing Assessment/Training    [place] Level (Upper Body Dressing)  doff;don;dependent (less than 25% patient effort)  -CL     Position (Upper Body Dressing)  supported sitting  -CL     Comment (Upper Body Dressing)  hosp gown, increased assist d/t lines  -CL     Row Name 02/01/21 1205          Toileting Assessment/Training    [place] Level (Toileting)  adjust/manage clothing;perform perineal hygiene;dependent (less than 25% patient effort)  -CL     Assistive Devices (Toileting)  commode, bedside without drop arms  -CL     Position (Toileting)  supported standing  -CL       User Key  (r) = Recorded By, (t) = Taken By, (c) = Cosigned By    Initials Name Provider Type    Jade Mckeon OT Occupational Therapist        Obj/Interventions     Row Name 02/01/21 1206          Balance    Balance Assessment  sitting static balance;sitting dynamic balance;standing static balance;standing dynamic balance  -CL     Static Sitting Balance  WFL;sitting, edge of bed  -CL     Dynamic Sitting Balance  WFL;sitting, edge of bed  -CL     Static Standing Balance  mild impairment;supported  -CL     Dynamic Standing Balance  mild impairment;supported  -CL     Comment, Balance  Pt performed visual scanning and  targetted reaching tasks w/ RUE, MAX VCs for scanning to R visual field  -CL       User Key  (r) = Recorded By, (t) = Taken By, (c) = Cosigned By    Initials Name Provider Type    CL Jade Neely OT Occupational Therapist        Goals/Plan    No documentation.       Clinical Impression     Row Name 02/01/21 1207          Pain Scale: FACES Pre/Post-Treatment    Pain: FACES Scale, Pretreatment  0-->no hurt  -CL     Posttreatment Pain Rating  0-->no hurt  -CL     Row Name 02/01/21 1207          Plan of Care Review    Plan of Care Reviewed With  patient  -CL     Progress  improving  -CL     Outcome Summary  Pt demo improved independence by performing BSC t/f w/ Min A and visually scanning beyond midline to the R w/ significant cueing. Pt conts to be Dep for LB ADL performance. Pt limited d/t R sided neglect and impulsivity though demo good effort. Recommend cont skilled IPOT POC. Recommend pt DC to IP rehab.  -CL     Row Name 02/01/21 1207          Therapy Plan Review/Discharge Plan (OT)    Anticipated Discharge Disposition (OT)  inpatient rehabilitation facility  -     Row Name 02/01/21 1207          Vital Signs    Pre Systolic BP Rehab  154  -CL     Pre Treatment Diastolic BP  100  -CL     Post Systolic BP Rehab  132  -CL     Post Treatment Diastolic BP  78  -CL     Pretreatment Heart Rate (beats/min)  93  -CL     Posttreatment Heart Rate (beats/min)  90  -CL     Pre SpO2 (%)  96  -CL     O2 Delivery Pre Treatment  room air  -CL     Post SpO2 (%)  95  -CL     O2 Delivery Post Treatment  room air  -CL     Pre Patient Position  Sitting  -CL     Intra Patient Position  Standing  -CL     Post Patient Position  Supine  -CL     Row Name 02/01/21 1207          Positioning and Restraints    Pre-Treatment Position  bedside commode  -CL     Post Treatment Position  bed  -CL     In Bed  notified nsg;fowlers;side rails up x3;call light within reach;exit alarm on;encouraged to call for assist  -CL       User Key  (r) = Recorded  By, (t) = Taken By, (c) = Cosigned By    Initials Name Provider Type    Jade Mckeon OT Occupational Therapist        Outcome Measures     Row Name 02/01/21 1209          How much help from another is currently needed...    Putting on and taking off regular lower body clothing?  1  -CL     Bathing (including washing, rinsing, and drying)  2  -CL     Toileting (which includes using toilet bed pan or urinal)  1  -CL     Putting on and taking off regular upper body clothing  2  -CL     Taking care of personal grooming (such as brushing teeth)  2  -CL     Eating meals  2  -CL     AM-PAC 6 Clicks Score (OT)  10  -CL     Row Name 02/01/21 1209          Modified [place] Scale    Modified [place] Scale  3 - Moderate disability.  Requiring some help, but able to walk without assistance.  -CL     Row Name 02/01/21 1209          Functional Assessment    Outcome Measure Options  AM-PAC 6 Clicks Daily Activity (OT);Modified [place]  -CL       User Key  (r) = Recorded By, (t) = Taken By, (c) = Cosigned By    Initials Name Provider Type    Jade Mckeon OT Occupational Therapist        Occupational Therapy Education                 Title: PT OT SLP Therapies (In Progress)     Topic: Occupational Therapy (In Progress)     Point: ADL training (In Progress)     Description:   Instruct learner(s) on proper safety adaptation and remediation techniques during self care or transfers.   Instruct in proper use of assistive devices.              Learning Progress Summary           Patient Acceptance, E, NR by CL at 2/1/2021 1210    Acceptance, E, NR by  at 1/31/2021 1154                   Point: Home exercise program (Not Started)     Description:   Instruct learner(s) on appropriate technique for monitoring, assisting and/or progressing therapeutic exercises/activities.              Learner Progress:  Not documented in this visit.          Point: Precautions (In Progress)     Description:   Instruct learner(s) on prescribed  precautions during self-care and functional transfers.              Learning Progress Summary           Patient Acceptance, E, NR by CL at 2/1/2021 1210    Acceptance, E, NR by CS at 1/31/2021 1154    Acceptance, E, NR by CS at 1/30/2021 0927                   Point: Body mechanics (In Progress)     Description:   Instruct learner(s) on proper positioning and spine alignment during self-care, functional mobility activities and/or exercises.              Learning Progress Summary           Patient Acceptance, E, NR by CL at 2/1/2021 1210    Acceptance, E, NR by CS at 1/31/2021 1154    Acceptance, E, NR by CS at 1/30/2021 0927                               User Key     Initials Effective Dates Name Provider Type Discipline     04/03/18 -  Jade Neely, OT Occupational Therapist OT     10/21/20 -  Sudhir Ortega OT Occupational Therapist OT              OT Recommendation and Plan     Plan of Care Review  Plan of Care Reviewed With: patient  Progress: improving  Outcome Summary: Pt demo improved independence by performing BSC t/f w/ Min A and visually scanning beyond midline to the R w/ significant cueing. Pt conts to be Dep for LB ADL performance. Pt limited d/t R sided neglect and impulsivity though demo good effort. Recommend cont skilled IPOT POC. Recommend pt DC to IP rehab.     Time Calculation:   Time Calculation- OT     Row Name 02/01/21 1210 02/01/21 1040          Time Calculation- OT    OT Start Time  1127  -CL  --     OT Received On  02/01/21  -CL  --     OT Goal Re-Cert Due Date  02/09/21  -CL  --        Timed Charges    20781 - Gait Training Minutes   --  15  -LS     15751 - OT Therapeutic Activity Minutes  13  -CL  --     86169 - OT Self Care/Mgmt Minutes  10  -CL  --       User Key  (r) = Recorded By, (t) = Taken By, (c) = Cosigned By    Initials Name Provider Type    LS Gris Farah, PT Physical Therapist    CL Jade Neely, OT Occupational Therapist        Therapy Charges for Today     Code  Description Service Date Service Provider Modifiers Qty    73491716927 HC OT THERAPEUTIC ACT EA 15 MIN 2/1/2021 Jade Neely, OT GO 1    94364750235 HC OT SELF CARE/MGMT/TRAIN EA 15 MIN 2/1/2021 Jade Neely OT GO 1               Jade Neely OT  2/1/2021

## 2021-02-01 NOTE — PLAN OF CARE
Goal Outcome Evaluation:  Plan of Care Reviewed With: patient  Progress: improving   SLP evaluation completed. Will continue to address dysphagia. MBS completed, initiated on modified po diet of puree w/ some mashed w/ HONEY thick liquids. Please see note for further details and recommendations.

## 2021-02-01 NOTE — PROGRESS NOTES
HOD# : 3    No events last night      Acute left frontal ICH 1/29/2021 (CMS/HCC)    Leukocytosis    COVID-19 virus detected at admission      Temp:  [97.7 °F (36.5 °C)-98.4 °F (36.9 °C)] 97.9 °F (36.6 °C)  Heart Rate:  [54-91] 76  Resp:  [16-20] 20  BP: (116-171)/(50-98) 155/91  I/O last 3 completed shifts:  In: 3403.7 [I.V.:3303.7; IV Piggyback:100]  Out: 2125 [Urine:2125]  I/O this shift:  In: 347 [I.V.:347]  Out: 350 [Urine:350]  Vital signs were reviewed and documented in the chart      EXAM   Covid precautions in place reported stable per nursing staff      Assessment and plan    Monitor for deterioration-scan stable thus far MRI MRV unremarkable for anything other than hemorrhage    Long-term plan will be follow-up outpatient with repeat imaging to ensure resolution of hemorrhages    Continue blood pressure control    PT OT rehab    If stable next 24 to 48 hours and if Covid stable can consider transfer to floor and rehab options    Patient has social issues in terms of support will likely need inpatient options so I suspect in the context of her Covid infection a protracted hospitalization

## 2021-02-01 NOTE — PLAN OF CARE
Problem: Adult Inpatient Plan of Care  Goal: Plan of Care Review  Outcome: Ongoing, Progressing  Flowsheets (Taken 2/1/2021 1503)  Progress: improving  Plan of Care Reviewed With: patient   Goal Outcome Evaluation:  Plan of Care Reviewed With: patient  Progress: improving   Patient alert. Has been irritable today, wants to smoke and have food. She just passed MBS but will get Dysphagia III diet with honey liquids. She remains aphagic, weak when ambulating, has right visual neglect and right facial droop. Blood pressure has been maintained today with Cardene.

## 2021-02-01 NOTE — MBS/VFSS/FEES
Acute Care - Speech Language Pathology   Swallow Initial Evaluation  [place]   Modified Barium Swallow Study (MBS)       Patient Name: Arlene Calderon  : 1965  MRN: [id number]  Today's Date: 2021      Admit Date: 2021    Visit Dx:     ICD-10-CM ICD-9-CM   1. Impaired functional mobility, balance, gait, and endurance  Z74.09 V49.89   2. Dysphagia, unspecified type  R13.10 787.20   3. Cognitive communication deficit  R41.841 799.52     Patient Active Problem List   Diagnosis   • Acute left frontal ICH 2021 (CMS/HCC)   • Leukocytosis   • COVID-19 virus detected at admission     History reviewed. No pertinent past medical history.  History reviewed. No pertinent surgical history.     SWALLOW EVALUATION (last 72 hours)      SLP Adult Swallow Evaluation     Row Name 21 1420       Rehab Evaluation    Document Type  evaluation  -CJ    Subjective Information  no complaints  -CJ    Patient Observations  alert;cooperative  -CJ    Care Plan Review  evaluation/treatment results reviewed  -CJ    Patient Effort  good  -CJ    Symptoms Noted During/After Treatment  none  -CJ       General Information    Patient Profile Reviewed  yes  -CJ    Pertinent History Of Current Problem  see initial eval  -CJ    Current Method of Nutrition  NPO  -CJ    Precautions/Limitations, Vision  neglect, right  -CJ    Precautions/Limitations, Hearing  WFL;for purposes of eval  -CJ    Prior Level of Function-Communication  WFL  -CJ    Prior Level of Function-Swallowing  safe, efficient swallowing in all situations  -CJ    Plans/Goals Discussed with  patient;agreed upon  -CJ    Barriers to Rehab  none identified  -CJ    Patient's Goals for Discharge  return to PO diet  -CJ       Pain    Additional Documentation  Pain Scale: FACES Pre/Post-Treatment (Group)  -CJ       Pain Scale: Numbers Pre/Post-Treatment    Pretreatment Pain Rating  --    Posttreatment Pain Rating  --       Pain Scale: FACES Pre/Post-Treatment    Pain: FACES  Scale, Pretreatment  0-->no hurt  -CJ    Posttreatment Pain Rating  0-->no hurt  -CJ          MBS/VFSS    Utensils Used  spoon;cup;straw  -CJ    Consistencies Trialed  regular textures;thin liquids;nectar/syrup-thick liquids;honey-thick liquids;pudding thick  -CJ       MBS/VFSS Interpretation    Oral Prep Phase  impaired oral phase of swallowing  -    Oral Transit Phase  impaired  -CJ    Oral Residue  impaired  -    VFSS Summary  [facility] [place] completed this pm at bedside 2/2 pt being COVID positive. Ms. Calderon is positioned upright and centered in bed to accept po presentation of puree, solid cracker, honey thick, nectar thick and thin liquids. Oral phase is moderately impaired w/ delayed ap transit, anterior loss of thin liquids on R side. Premature spillage intermittently w/ all liquid consistencies to the bilateral pyriforms 2/2 impaired BOT retraction. Deep laryngeal penetration w/ thin and nectar thick liquids occuring during the swallow w/ subsequent aspiration occuring during the swallow 2/2 delayed swallow initiation, reduced hyolaryngeal elevation and impaired vestibular closure. Intermittent cough response elicited w/ penetration and aspiration however was not consistent across this evaluation and pt had episodes of silent aspiration. Transient penetration of honey thick liquids however clears upon completion of swallow w/o significant residue. Mild diffuse residue in the bilateral pyriforms and posterior pharyngeal wall w/ all consistencies. No other laryngeal penetration or aspiraiton evidneced. Per this evaluation Ms. Calderon presents w/ a moderate-severe oropharyngeal dysphagia. She is felt to most benefit from initiation of modified po diet of puree w/ some mashed w/ HONEY thick liquids. Meds crushed in puree. Upright and centered for all po intake. 1:1 assist w/ all po intake. Small bites/sips, consecutive swallows. General aspiration precautions. Fatigue precautions.   -       Oral Preparatory Phase     Oral Preparatory Phase  anterior loss;prolonged manipulation  -CJ    Anterior Loss  thin liquids;secondary to reduced labial seal  -CJ    Prolonged Manipulation  regular textures;secondary to reduced lingual strength;secondary to impaired cognitive status  -CJ       Oral Transit Phase    Impaired Oral Transit Phase  delayed initiation of bolus transit;premature spillage of liquids into pharynx  -CJ    Delayed Initiation of bolus transit  all consistencies tested;secondary to reduced lingual control;secondary to impaired cognitive status  -CJ    Premature Spillage of Liquids into Pharynx  thin liquids;nectar-thick liquids;honey-thick liquids;secondary to reduced lingual control;secondary to impaired cognitive status  -CJ       Oral Residue    Impaired Oral Residue  diffuse residue throughout oral cavity  -CJ    Diffuse Residue throughout Oral Cavity  pudding/puree;regular textures;secondary to reduced lingual strength  -CJ    Response to Oral Residue  cleared residue;with cued swallow;with liquid wash  -CJ       Initiation of Pharyngeal Swallow    Initiation of Pharyngeal Swallow  bolus in pyriform sinuses  -CJ    Pharyngeal Phase  impaired pharyngeal phase of swallowing  -CJ    Penetration During the Swallow  thin liquids;nectar-thick liquids;honey-thick liquids;secondary to delayed swallow initiation or mistiming;secondary to reduced laryngeal elevation;secondary to reduced vestibular closure  -CJ    Aspiration During the Swallow  thin liquids;nectar-thick liquids;secondary to delayed swallow initiation or mistiming;secondary to reduced laryngeal elevation;secondary to reduced vestibular closure  -CJ    Response to Penetration  deep;cough;inconsistent response  -CJ    Response to Aspiration  cough;inconsistent response;weak cough/throat clear  -CJ    Rosenbek's Scale  thin:;nectar:;8--->level 8;honey:;pudding/puree:;2--->level 2  -CJ    Pharyngeal Residue  regular textures;pudding/puree;honey-thick  liquids;nectar-thick liquids;thin liquids;pyriform sinuses;posterior pharyngeal wall;secondary to reduced base of tongue retraction;secondary to reduced posterior pharyngeal wall stripping;secondary to reduced laryngeal elevation  -CJ    Response to Residue  cleared residue with cued swallow;cleared residue with liquid wash  -CJ    Attempted Compensatory Maneuvers  bolus size;bolus presentation style;alternative liquids/solids;multiple swallows;additional subsequent swallow;other (see comments) difficult to utilize compensatory 2/2 cog status  -CJ    Response to Attempted Compensatory Maneuvers  did not prevent penetration;did not prevent aspiration;reduced residue  -CJ       Clinical Impression    SLP Swallowing Diagnosis  mod-severe;oral dysphagia;pharyngeal dysphagia  -CJ    Functional Impact  risk of aspiration/pneumonia  -    Rehab Potential/Prognosis, Swallowing  adequate, monitor progress closely  -    Swallow Criteria for Skilled Therapeutic Interventions Met  demonstrates skilled criteria  -    Plan for Continued Treatment (SLP)  MBS completed, will initiate modified po diet  -       Recommendations    Therapy Frequency (Swallow)  5 days per week  -    Predicted Duration Therapy Intervention (Days)  until discharge  -    SLP Diet Recommendation  puree with some mashed;honey thick liquids  -    Recommended Diagnostics  --    Recommended Precautions and Strategies  upright posture during/after eating;small bites of food and sips of liquid;no straw;alternate between small bites of food and sips of liquid;multiple swallows per sip of liquid;multiple swallows per bite of food;general aspiration precautions;fatigue precautions;1:1 supervision;assist with feeding  -    Oral Care Recommendations  Oral Care BID/PRN  -    SLP Rec. for Method of Medication Administration  meds crushed;with pudding or applesauce;as tolerated  -    Monitor for Signs of Aspiration  yes;notify SLP if any concerns  -     Anticipated Discharge Disposition (SLP)  inpatient rehabilitation facility;anticipate therapy at next level of care  -      User Key  (r) = Recorded By, (t) = Taken By, (c) = Cosigned By    Initials Name Effective Dates    Adrienne France, MS CCC-SLP 08/09/20 -     Jacquelin Burger, MS CCC-SLP 02/11/20 -           EDUCATION  The patient has been educated in the following areas:   Dysphagia; Modified Diet instruction; Oral care protocol      SLP Recommendation and Plan  SLP Swallowing Diagnosis: mod-severe, oral dysphagia, pharyngeal dysphagia  SLP Diet Recommendation: puree with some mashed, honey thick liquids  Recommended Precautions and Strategies: upright posture during/after eating, small bites of food and sips of liquid, no straw, alternate between small bites of food and sips of liquid, multiple swallows per sip of liquid, multiple swallows per bite of food, general aspiration precautions, fatigue precautions, 1:1 supervision, assist with feeding  SLP Rec. for Method of Medication Administration: meds crushed, with pudding or applesauce, as tolerated     Monitor for Signs of Aspiration: yes, notify SLP if any concerns     Swallow Criteria for Skilled Therapeutic Interventions Met: demonstrates skilled criteria  Anticipated Discharge Disposition (SLP): inpatient rehabilitation facility, anticipate therapy at next level of care  Rehab Potential/Prognosis, Swallowing: adequate, monitor progress closely  Therapy Frequency (Swallow): 5 days per week  Predicted Duration Therapy Intervention (Days): until discharge          Plan for Continued Treatment (SLP): MBS completed, will initiate modified po diet              Plan of Care Reviewed With: patient  Progress: improving    SLP GOALS     Row Name 02/01/21 1420 01/31/21 0910          Oral Nutrition/Hydration Goal 1 (SLP)    Oral Nutrition/Hydration Goal 1, SLP  LTG: Pt will tolerate modified po diet of puree w/ honey thick liquids w/o overt s/s of  aspiraiton/distress w/ 100% acc given min cues  -CJ  --     Time Frame (Oral Nutrition/Hydration Goal 1, SLP)  by discharge  -CJ  --        Oral Nutrition/Hydration Goal 2 (SLP)    Oral Nutrition/Hydration Goal 2, SLP  STG: Pt will accept trials of nectar thick liquids, soft solids w/o overt s/s of aspiration/distress w/ 90% acc w/o cues to determine pt readiness to participate in instrumental dysphagia re-evaluation.  -CJ  --     Time Frame (Oral Nutrition/Hydration Goal 2, SLP)  short term goal (STG)  -CJ  --        Labial Strengthening Goal 1 (SLP)    Activity (Labial Strengthening Goal 1, SLP)  increase labial tone  -CJ  --     Increase Labial Tone  labial resistance exercises;swallow trials  -CJ  --     [place]/Accuracy (Labial Strengthening Goal 1, SLP)  with moderate cues (50-74% accuracy)  -CJ  --     Time Frame (Labial Strengthening Goal 1, SLP)  short term goal (STG)  -CJ  --        Lingual Strengthening Goal 1 (SLP)    Activity (Lingual Strengthening Goal 1, SLP)  increase lingual tone/sensation/control/coordination/movement;increase tongue back strength  -CJ  --     Increase Lingual Tone/Sensation/Control/Coordination/Movement  swallow trials;lingual resistance exercises  -CJ  --     Increase Tongue Back Strength  swallow trials;lingual resistance exercises  -CJ  --     [place]/Accuracy (Lingual Strengthening Goal 1, SLP)  with moderate cues (50-74% accuracy)  -CJ  --     Time Frame (Lingual Strengthening Goal 1, SLP)  short term goal (STG)  -CJ  --        Pharyngeal Strengthening Exercise Goal 1 (SLP)    Activity (Pharyngeal Strengthening Goal 1, SLP)  increase timing;increase superior movement of the hyolaryngeal complex;increase anterior movement of the hyolaryngeal complex;increase closure at entrance to airway/closure of airway at glottis;increase squeeze/positive pressure generation;increase tongue base retraction  -CJ  --     Increase Timing  prepping - 3 second prep or suck swallow or  3-step swallow  -CJ  --     Increase Superior Movement of the Hyolaryngeal Complex  falsetto  -CJ  --     Increase Anterior Movement of the Hyolaryngeal Complex  chin tuck against resistance (CTAR)  -CJ  --     Increase Closure at Entrance to Airway/Closure of Airway at Glottis  supraglottic swallow  -CJ  --     Increase Squeeze/Positive Pressure Generation  hard effortful swallow  -CJ  --     Increase Tongue Base Retraction  jeana  -CJ  --     [place]/Accuracy (Pharyngeal Strengthening Goal 1, SLP)  with moderate cues (50-74% accuracy)  -CJ  --     Time Frame (Pharyngeal Strengthening Goal 1, SLP)  short term goal (STG)  -CJ  --        Swallow Compensatory Strategies Goal 1 (SLP)    Activity (Swallow Compensatory Strategies/Techniques Goal 1, SLP)  compensatory strategies;during meal intake;during p.o. trials;small bites;small cup sips;food/liquid placed on stronger left side;alternate food/liquid intake;extra swallow per bolus  -CJ  --     [place]/Accuracy (Swallow Compensatory Strategies/Techniques Goal 1, SLP)  with minimal cues (75-90% accuracy)  -CJ  --     Time Frame (Swallow Compensatory Strategies/Techniques Goal 1, SLP)  short term goal (STG)  -CJ  --        Comprehend Questions Goal 1 (SLP)    Improve Ability to Comprehend Questions Goal 1 (SLP)  --  simple yes/no questions;100%;independently (over 90% accuracy)  -     Time Frame (Comprehend Questions Goal 1, SLP)  --  short term goal (STG)  -        Follow Directions Goal 2 (SLP)    Improve Ability to Follow Directions Goal 1 (SLP)  --  1 step direction with objects;1 step direction without objects;90%;with minimal cues (75-90%)  -     Time Frame (Follow Directions Goal 1, SLP)  --  short term goal (STG)  -        Word Retrieval Skills Goal 1 (SLP)    Improve Word Retrieval Skills By Goal 1 (SLP)  --  confrontational naming task;completing open ended unstructured sentence;answer WH question with one word;80%;with minimal cues (75-90%)   -SM     Time Frame (Word Retrieval Goal 1, SLP)  --  short term goal (STG)  -SM        Articulation Goal 1 (SLP)    Improve Articulation Goal 1 (SLP)  --  by over-articulating at word level;90%;with minimal cues (75-90%)  -SM     Time Frame (Articulation Goal 1, SLP)  --  short term goal (STG)  -SM        Orientation Goal 1 (SLP)    Improve Orientation Through Goal 1 (SLP)  --  demonstrating orientation to year;demonstrating orientation to place;demonstrating orientation to disease/impairment;100%;with minimal cues (75-90%)  -SM     Time Frame (Orientation Goal 1, SLP)  --  short term goal (STG)  -SM        Functional Problem Solving Skills Goal 1 (SLP)    Improve Problem Solving Through Goal 1 (SLP)  --  determine solutions to simple ADL/safety problems;90%;with minimal cues (75-90%)  -SM     Time Frame (Problem Solving Goal 1, SLP)  --  short term goal (STG)  -SM        Additional Goal 1 (SLP)    Additional Goal 1, SLP  --  LTG: Improve cognitive-communication skills in order to participate in care while in hospital setting with 90% accuracy and cues  -     Time Frame (Additional Goal 1, SLP)  --  by discharge  -       User Key  (r) = Recorded By, (t) = Taken By, (c) = Cosigned By    Initials Name Provider Type    Adrienne France MS CCC-SLP Speech and Language Pathologist    Jacquelin Burger, MS CCC-SLP Speech and Language Pathologist             Time Calculation:   Time Calculation- SLP     Row Name 02/01/21 1558             Time Calculation- [facility]    SLP Start Time  1420  -      SLP Received On  02/01/21  -        User Key  (r) = Recorded By, (t) = Taken By, (c) = Cosigned By    Initials Name Provider Type    Jacquelin Burger, MS CCC-SLP Speech and Language Pathologist          Therapy Charges for Today     Code Description Service Date Service Provider Modifiers Qty    [id number] HC ST MOTION FLUORO EVAL SWALLOW 4 2/1/2021 Jacquelin Bradshaw, MS CCC-SLP GN 1      Patient was not wearing a  face mask and did exhibit coughing during this therapy encounter.  Procedure performed was aerosolizing, involved close contact (within 6 feet for at least 15 minutes or longer), and did not involve contact with infectious secretions or specimens.  Therapist used appropriate personal protective equipment including gloves, standard procedure mask, eye protection, gown and N95 mask.  Appropriate PPE was worn during the entire therapy session.  Hand hygiene was completed before and after therapy session.            Jacquelin Bradshaw MS CCC-SLP  2/1/2021

## 2021-02-01 NOTE — CONSULTS
Chart reviewed for diabetes , initiated per stroke protocol. Patients EMV 13 , a1c 6 . Patient does not qualify for diabetes stroke class as there is no official diagnoses of diabetes at this time.  We will continue to monitor for an appropriate educational opportunity. Thank you for this referral.

## 2021-02-01 NOTE — PROGRESS NOTES
"Clinical Nutrition Note      Patient Name: Arlene Calderon  MRN: [id number]  Admission date: 1/29/2021      Multidisciplinary Rounds    Additional information obtained during MDR:  RN reports pt from OSH w/ lg ICH, found to be COVID-19 positive on adm screen.  NIHSS is a 9 this morning pt w/ facial droop R visual field cut and significant expressive aphasia, does want to smoke and eat.. Pt to have MBS this afternoon.    Current diet: Diet Dysphagia; III - Pureed With Some Mashed; Honey Thick; No Straws; Cardiac    Oral Nutrition Supplement: No active supplement orders      Patient isn't on Tube Feeding      Patient doesn't have any tube feeding modular orders    Pertinent medical data reviewed:  No nutrition risk identified on nursing screen; MST score \"0\"    Intervention:  Plan of care and goals reviewed    Monitor:  RD to follow per protocol      Jenny Willis MS,RD,LD  02/01/21 17:28 EST  Time: 15  mins       "

## 2021-02-01 NOTE — PLAN OF CARE
Goal Outcome Evaluation:  Plan of Care Reviewed With: patient  Progress: improving  Outcome Summary: Pt demo improved independence by performing BSC t/f w/ Min A and visually scanning beyond midline to the R w/ significant cueing. Pt conts to be Dep for LB ADL performance. Pt limited d/t R sided neglect and impulsivity though demo good effort. Recommend cont skilled IPOT POC. Recommend pt DC to IP rehab.

## 2021-02-01 NOTE — PROGRESS NOTES
Intensivist Note     2/1/2021  Hospital Day: 3  * No surgery found *  ICU Stays Timeline            Hospital Admission: 01/29/21 2031 - Current  ICU stays: 1      In Date/Time Event Department ICU Stay Duration     01/29/21 2031 Admission [facility] 2B ICU 2 days 20 hours 56 minutes             Ms. Arlene Calderon, 55 y.o. female is followed for:    Acute left frontal ICH 1/29/2021. Smaller right parieto-occipital ICH also present (CMS/HCC)    COVID-19 virus detected at admission    Leukocytosis       SUBJECTIVE     57-year-old white female smoker found at home with right-sided weakness and left lateral gaze and confusion. EMS was called and transferred to [facility] 1/29/2021 where she was found to be hypertensive with a low-grade temp.  CT scan revealed large left frontal intraparenchymal hemorrhage with surrounding vasogenic edema and localized mass-effect. Initial NIH was 22 with a GCS of 4-3-6.  ICH volume of 18 and ICH score of 1. Was transferred to [facility] the evening of 1/29/2021 and neurosurgery saw no sign of aneurysmal cause of bleed. Was felt most likely to have a hemorrhagic conversion of stroke or hypertensive hemorrhage. Surgical intervention was not recommended and focus has been on blood pressure control.  In addition patient was started on Keppra 1/31/2021.  Subsequent to her admission patient was documented to have a positive COVID-19 PCR.  She was however asymptomatic other than a low-grade temp of 100.1 without any other symptoms such as nasal congestion cough, loss of taste or smell etc.    Interval history: Patient still with right visual field cut and right-sided weakness as well as some expressive aphasia with slow speech and dysarthria.  She can however make herself understood.  Speech evaluation was performed today and she has persistent moderate to severe dysphagia but speech feels she can tolerate a modified diet (puréed with some mashed and honey thickened liquids.  Patient has still been hypertensive  "and remains on Cardene.  She has been afebrile for over 24 hours, but WBC 16.26.  CRP less than 0.30, procalcitonin 0.03, ferritin 245.3, and .      ROS: Per subjective, all other systems reviewed and were negative.    The patient's relevant PMH, PSH, FH, and SH were reviewed and updated in Epic as appropriate. Allergies and Medications reviewed.    OBJECTIVE     /78   Pulse 70   Temp 98.3 °F (36.8 °C) (Oral)   Resp 20   Ht 157.5 cm (62.01\")   Wt 59 kg (130 lb 1.1 oz)   SpO2 95%   BMI 23.78 kg/m²           Flowsheet Rows      First Filed Value   Admission Height  157.5 cm (62.01\") Documented at 01/30/2021 0600   Admission Weight  58.8 kg (129 lb 10.1 oz) Documented at 01/30/2021 0600        Intake & Output (last day)       01/31 0701 - 02/01 0700 02/01 0701 - 02/02 0700    I.V. (mL/kg) 1561.8 (26.5) 1164 (19.7)    IV Piggyback 100     Total Intake(mL/kg) 1661.8 (28.2) 1164 (19.7)    Urine (mL/kg/hr) 1475 (1) 1750 (2.8)    Total Output 1475 1750    Net +186.8 -586                Exam:  General Exam:  Well-developed middle-aged white female in NAD  HEENT: Pupils equal and reactive. Nose and throat clear.  Neck:                          Supple, no JVD, thyromegaly, or adenopathy  Lungs: Clear anteriorly and laterally  Cardiovascular: Regular rate and rhythm without murmurs or gallops.  68 bpm  Abdomen: Soft nontender without organomegaly or masses.   and rectal: Deferred.  Extremities: No cyanosis clubbing edema.  Neurologic:                 Right-sided weakness, speech dysarthric and slow but appropriate.  Right visual field cut.    Chest X-Ray 2/1/2021: Normal    INFUSIONS  niCARdipine, 5-15 mg/hr, Last Rate: 2.5 mg/hr (02/01/21 1450)  sodium chloride, 75 mL/hr, Last Rate: 75 mL/hr (02/01/21 0900)        Results from last 7 days   Lab Units 02/01/21  0502 01/31/21  0609 01/29/21  1811   WBC 10*3/mm3 16.26* 13.86* 13.92*   HEMOGLOBIN g/dL 14.4 15.9 15.3   HEMATOCRIT % 44.0 49.0* 45.1   PLATELETS " 10*3/mm3 290 329 359     Results from last 7 days   Lab Units 02/01/21  0502 01/31/21  0609   SODIUM mmol/L 138 139   POTASSIUM mmol/L 4.2 3.7   CHLORIDE mmol/L 106 106   CO2 mmol/L 23.0 24.0   BUN mg/dL 21* 23*   CREATININE mg/dL 0.63 0.57   GLUCOSE mg/dL 93 131*   CALCIUM mg/dL 8.7 9.4     Results from last 7 days   Lab Units 02/01/21  0502 01/31/21  0609 01/30/21  0437   MAGNESIUM mg/dL 2.3 2.4 2.5   PHOSPHORUS mg/dL 3.3 3.2  --      Results from last 7 days   Lab Units 01/29/21  1811   ALK PHOS U/L 101   BILIRUBIN mg/dL 0.4   ALT (SGPT) U/L 17   AST (SGOT) U/L 19       No results found for: SEDRATE  No results found for: BNP  Lab Results   Component Value Date    CKTOTAL 56 01/29/2021    TROPONINT <0.010 01/29/2021     No results found for: TSH  No results found for: LACTATE  No results found for: CORTISOL      I reviewed the patient's results, images and medication.    Assessment/Plan   ASSESSMENT        Acute left frontal ICH 1/29/2021. Smaller right parieto-occipital ICH also present (CMS/HCC)    COVID-19 virus detected at admission    Leukocytosis      DISCUSSION: Continues to clinically improve but NIH is still 9. Has an abnormal dysphagia evaluation but speech has cleared her for a dysphagia 3 diet (puréed with some mashed and honey thickened liquids. Blood pressure remains a problem and still requiring Cardene. Will begin oral agents to see if can taper off the Cardene drip.  I still do not understand the bilateral nature of her ICH. Regarding the etiology of her bleed neurosurgery apparently feels this is either hemorrhagic conversion of a stroke or a hypertensive hemorrhage.  But why would it be bilateral unless it was embolic.  MRI did not suggest any evidence of a sinus thrombosis due to hypercoagulability and at the present time her COVID-19 infection is not associated with any significant increase in inflammatory markers, chest x-ray is clear, and gas exchange is normal thus patient does not require  any therapy..    PLAN     1.  Begin metoprolol and amlodipine  2.  See if possible to taper off Cardene drip but make sure SBP less than 140  3.  Continue to follow for any potential complications from COVID-19 infection  4.  Mobilization  5.  Will transfer to the floor tomorrow if remains stable  6.  Will need inpatient rehab  7.  Defer to neurosurgery whether Keppra seizure prophylaxis is to continue as outpatient and for how long  8.  Echocardiogram    Plan of care and goals reviewed with multidisciplinary team at daily rounds.    I discussed the patient's findings and my recommendations with patient and nursing staff    Patient is critically ill due to intracranial hemorrhage and has a high risk of life-threatening decline in condition.  They require continuous monitoring and frequent reassessment of condition for adjustment of management in order to lessen risk.  I visited the patient's bedside and interacted with nurse numerous times today.    Time spent Critical care 35 min (It does not include procedure time).    Electronically signed by Chaka Gracia MD, 02/01/21, 5:27 PM EST.   Pulmonary / Critical care medicine

## 2021-02-02 ENCOUNTER — APPOINTMENT (OUTPATIENT)
Dept: CARDIOLOGY | Facility: HOSPITAL | Age: 56
End: 2021-02-02

## 2021-02-02 PROBLEM — I10 ESSENTIAL HYPERTENSION: Status: ACTIVE | Noted: 2021-02-02

## 2021-02-02 PROBLEM — D72.829 LEUKOCYTOSIS: Status: RESOLVED | Noted: 2021-01-29 | Resolved: 2021-02-02

## 2021-02-02 PROBLEM — Q21.12 PFO (PATENT FORAMEN OVALE): Status: ACTIVE | Noted: 2021-02-02

## 2021-02-02 LAB
ALBUMIN SERPL-MCNC: 3.7 G/DL (ref 3.5–5.2)
ANION GAP SERPL CALCULATED.3IONS-SCNC: 9 MMOL/L (ref 5–15)
ASCENDING AORTA: 3.1 CM
BASOPHILS # BLD AUTO: 0.02 10*3/MM3 (ref 0–0.2)
BASOPHILS NFR BLD AUTO: 0.2 % (ref 0–1.5)
BH CV ECHO MEAS - AO MAX PG (FULL): 2 MMHG
BH CV ECHO MEAS - AO MAX PG: 2.8 MMHG
BH CV ECHO MEAS - AO MEAN PG (FULL): 1.3 MMHG
BH CV ECHO MEAS - AO MEAN PG: 1.7 MMHG
BH CV ECHO MEAS - AO ROOT AREA (BSA CORRECTED): 1.9
BH CV ECHO MEAS - AO ROOT AREA: 7 CM^2
BH CV ECHO MEAS - AO ROOT DIAM: 3 CM
BH CV ECHO MEAS - AO V2 MAX: 84.2 CM/SEC
BH CV ECHO MEAS - AO V2 MEAN: 61.2 CM/SEC
BH CV ECHO MEAS - AO V2 VTI: 20.3 CM
BH CV ECHO MEAS - ASC AORTA: 3.1 CM
BH CV ECHO MEAS - AVA(I,A): 2.4 CM^2
BH CV ECHO MEAS - AVA(I,D): 2.4 CM^2
BH CV ECHO MEAS - AVA(V,A): 2.2 CM^2
BH CV ECHO MEAS - AVA(V,D): 2.2 CM^2
BH CV ECHO MEAS - BSA(HAYCOCK): 1.6 M^2
BH CV ECHO MEAS - BSA: 1.6 M^2
BH CV ECHO MEAS - BZI_BMI: 24.3 KILOGRAMS/M^2
BH CV ECHO MEAS - BZI_METRIC_HEIGHT: 157.5 CM
BH CV ECHO MEAS - BZI_METRIC_WEIGHT: 60.3 KG
BH CV ECHO MEAS - EDV(CUBED): 55.7 ML
BH CV ECHO MEAS - EDV(MOD-SP2): 69 ML
BH CV ECHO MEAS - EDV(MOD-SP4): 95 ML
BH CV ECHO MEAS - EDV(TEICH): 62.7 ML
BH CV ECHO MEAS - EF(CUBED): 66.6 %
BH CV ECHO MEAS - EF(MOD-BP): 55 %
BH CV ECHO MEAS - EF(MOD-SP2): 55.1 %
BH CV ECHO MEAS - EF(MOD-SP4): 55.8 %
BH CV ECHO MEAS - EF(TEICH): 58.9 %
BH CV ECHO MEAS - ESV(CUBED): 18.6 ML
BH CV ECHO MEAS - ESV(MOD-SP2): 31 ML
BH CV ECHO MEAS - ESV(MOD-SP4): 42 ML
BH CV ECHO MEAS - ESV(TEICH): 25.8 ML
BH CV ECHO MEAS - FS: 30.6 %
BH CV ECHO MEAS - IVS/LVPW: 1
BH CV ECHO MEAS - IVSD: 0.9 CM
BH CV ECHO MEAS - LA DIMENSION: 3.1 CM
BH CV ECHO MEAS - LA/AO: 1
BH CV ECHO MEAS - LAD MAJOR: 4 CM
BH CV ECHO MEAS - LAT PEAK E' VEL: 9 CM/SEC
BH CV ECHO MEAS - LATERAL E/E' RATIO: 4.9
BH CV ECHO MEAS - LV DIASTOLIC VOL/BSA (35-75): 59.1 ML/M^2
BH CV ECHO MEAS - LV IVRT: 0.11 SEC
BH CV ECHO MEAS - LV MASS(C)D: 96.5 GRAMS
BH CV ECHO MEAS - LV MASS(C)DI: 60.1 GRAMS/M^2
BH CV ECHO MEAS - LV MAX PG: 0.87 MMHG
BH CV ECHO MEAS - LV MEAN PG: 0.42 MMHG
BH CV ECHO MEAS - LV SYSTOLIC VOL/BSA (12-30): 26.1 ML/M^2
BH CV ECHO MEAS - LV V1 MAX: 46.6 CM/SEC
BH CV ECHO MEAS - LV V1 MEAN: 29.8 CM/SEC
BH CV ECHO MEAS - LV V1 VTI: 12.1 CM
BH CV ECHO MEAS - LVIDD: 3.8 CM
BH CV ECHO MEAS - LVIDS: 2.7 CM
BH CV ECHO MEAS - LVLD AP2: 6.7 CM
BH CV ECHO MEAS - LVLD AP4: 7.3 CM
BH CV ECHO MEAS - LVLS AP2: 5.6 CM
BH CV ECHO MEAS - LVLS AP4: 6.4 CM
BH CV ECHO MEAS - LVOT AREA (M): 3.8 CM^2
BH CV ECHO MEAS - LVOT AREA: 4 CM^2
BH CV ECHO MEAS - LVOT DIAM: 2.2 CM
BH CV ECHO MEAS - LVPWD: 0.9 CM
BH CV ECHO MEAS - MED PEAK E' VEL: 7.6 CM/SEC
BH CV ECHO MEAS - MEDIAL E/E' RATIO: 5.8
BH CV ECHO MEAS - MV A MAX VEL: 51.1 CM/SEC
BH CV ECHO MEAS - MV DEC SLOPE: 212.1 CM/SEC^2
BH CV ECHO MEAS - MV DEC TIME: 0.35 SEC
BH CV ECHO MEAS - MV E MAX VEL: 44.9 CM/SEC
BH CV ECHO MEAS - MV E/A: 0.88
BH CV ECHO MEAS - MV P1/2T MAX VEL: 61.3 CM/SEC
BH CV ECHO MEAS - MV P1/2T: 84.6 MSEC
BH CV ECHO MEAS - MVA P1/2T LCG: 3.6 CM^2
BH CV ECHO MEAS - MVA(P1/2T): 2.6 CM^2
BH CV ECHO MEAS - PA ACC SLOPE: 332.9 CM/SEC^2
BH CV ECHO MEAS - PA ACC TIME: 0.2 SEC
BH CV ECHO MEAS - PA MAX PG: 1.4 MMHG
BH CV ECHO MEAS - PA PR(ACCEL): -9.7 MMHG
BH CV ECHO MEAS - PA V2 MAX: 58.1 CM/SEC
BH CV ECHO MEAS - SI(AO): 87.9 ML/M^2
BH CV ECHO MEAS - SI(CUBED): 23.1 ML/M^2
BH CV ECHO MEAS - SI(LVOT): 29.7 ML/M^2
BH CV ECHO MEAS - SI(MOD-SP2): 23.6 ML/M^2
BH CV ECHO MEAS - SI(MOD-SP4): 33 ML/M^2
BH CV ECHO MEAS - SI(TEICH): 23 ML/M^2
BH CV ECHO MEAS - SV(AO): 141.3 ML
BH CV ECHO MEAS - SV(CUBED): 37.1 ML
BH CV ECHO MEAS - SV(LVOT): 47.7 ML
BH CV ECHO MEAS - SV(MOD-SP2): 38 ML
BH CV ECHO MEAS - SV(MOD-SP4): 53 ML
BH CV ECHO MEAS - SV(TEICH): 36.9 ML
BH CV ECHO MEAS - TAPSE (>1.6): 1.6 CM
BH CV ECHO MEASUREMENTS AVERAGE E/E' RATIO: 5.41
BH CV VAS BP LEFT ARM: NORMAL MMHG
BH CV XLRA - RV BASE: 4 CM
BH CV XLRA - RV LENGTH: 5.5 CM
BH CV XLRA - RV MID: 2.6 CM
BH CV XLRA - TDI S': 9.43 CM/SEC
BUN SERPL-MCNC: 18 MG/DL (ref 6–20)
BUN/CREAT SERPL: 31 (ref 7–25)
CALCIUM SPEC-SCNC: 8.6 MG/DL (ref 8.6–10.5)
CHLORIDE SERPL-SCNC: 104 MMOL/L (ref 98–107)
CO2 SERPL-SCNC: 26 MMOL/L (ref 22–29)
CREAT SERPL-MCNC: 0.58 MG/DL (ref 0.57–1)
DEPRECATED RDW RBC AUTO: 42.8 FL (ref 37–54)
EOSINOPHIL # BLD AUTO: 0.06 10*3/MM3 (ref 0–0.4)
EOSINOPHIL NFR BLD AUTO: 0.6 % (ref 0.3–6.2)
ERYTHROCYTE [DISTWIDTH] IN BLOOD BY AUTOMATED COUNT: 13.3 % (ref 12.3–15.4)
GFR SERPL CREATININE-BSD FRML MDRD: 108 ML/MIN/1.73
GLUCOSE SERPL-MCNC: 80 MG/DL (ref 65–99)
HCT VFR BLD AUTO: 48.2 % (ref 34–46.6)
HGB BLD-MCNC: 16 G/DL (ref 12–15.9)
IMM GRANULOCYTES # BLD AUTO: 0.06 10*3/MM3 (ref 0–0.05)
IMM GRANULOCYTES NFR BLD AUTO: 0.6 % (ref 0–0.5)
LEFT ATRIUM VOLUME INDEX: 13.1 ML/M^2
LEFT ATRIUM VOLUME: 21 ML
LV EF 2D ECHO EST: 60 %
LYMPHOCYTES # BLD AUTO: 3.66 10*3/MM3 (ref 0.7–3.1)
LYMPHOCYTES NFR BLD AUTO: 36.8 % (ref 19.6–45.3)
MAGNESIUM SERPL-MCNC: 2.2 MG/DL (ref 1.6–2.6)
MCH RBC QN AUTO: 29.3 PG (ref 26.6–33)
MCHC RBC AUTO-ENTMCNC: 33.2 G/DL (ref 31.5–35.7)
MCV RBC AUTO: 88.3 FL (ref 79–97)
MONOCYTES # BLD AUTO: 0.91 10*3/MM3 (ref 0.1–0.9)
MONOCYTES NFR BLD AUTO: 9.2 % (ref 5–12)
NEUTROPHILS NFR BLD AUTO: 5.23 10*3/MM3 (ref 1.7–7)
NEUTROPHILS NFR BLD AUTO: 52.6 % (ref 42.7–76)
NRBC BLD AUTO-RTO: 0 /100 WBC (ref 0–0.2)
PHOSPHATE SERPL-MCNC: 3 MG/DL (ref 2.5–4.5)
PLATELET # BLD AUTO: 293 10*3/MM3 (ref 140–450)
PMV BLD AUTO: 10.4 FL (ref 6–12)
POTASSIUM SERPL-SCNC: 3.6 MMOL/L (ref 3.5–5.2)
RBC # BLD AUTO: 5.46 10*6/MM3 (ref 3.77–5.28)
SODIUM SERPL-SCNC: 139 MMOL/L (ref 136–145)
WBC # BLD AUTO: 9.94 10*3/MM3 (ref 3.4–10.8)

## 2021-02-02 PROCEDURE — 93306 TTE W/DOPPLER COMPLETE: CPT | Performed by: INTERNAL MEDICINE

## 2021-02-02 PROCEDURE — 92526 ORAL FUNCTION THERAPY: CPT

## 2021-02-02 PROCEDURE — 93306 TTE W/DOPPLER COMPLETE: CPT

## 2021-02-02 PROCEDURE — 99233 SBSQ HOSP IP/OBS HIGH 50: CPT | Performed by: INTERNAL MEDICINE

## 2021-02-02 PROCEDURE — 25010000002 LEVETIRACETAM IN NACL 0.82% 500 MG/100ML SOLUTION: Performed by: NEUROLOGICAL SURGERY

## 2021-02-02 PROCEDURE — 25010000002 LEVETIRACETAM IN NACL 0.82% 500 MG/100ML SOLUTION: Performed by: INTERNAL MEDICINE

## 2021-02-02 PROCEDURE — 80069 RENAL FUNCTION PANEL: CPT | Performed by: INTERNAL MEDICINE

## 2021-02-02 PROCEDURE — 97530 THERAPEUTIC ACTIVITIES: CPT

## 2021-02-02 PROCEDURE — 97535 SELF CARE MNGMENT TRAINING: CPT

## 2021-02-02 PROCEDURE — 92507 TX SP LANG VOICE COMM INDIV: CPT

## 2021-02-02 PROCEDURE — 85025 COMPLETE CBC W/AUTO DIFF WBC: CPT | Performed by: INTERNAL MEDICINE

## 2021-02-02 PROCEDURE — 83735 ASSAY OF MAGNESIUM: CPT | Performed by: INTERNAL MEDICINE

## 2021-02-02 RX ORDER — AMLODIPINE BESYLATE 10 MG/1
10 TABLET ORAL
Status: DISCONTINUED | OUTPATIENT
Start: 2021-02-03 | End: 2021-02-05 | Stop reason: HOSPADM

## 2021-02-02 RX ORDER — HYDRALAZINE HYDROCHLORIDE 25 MG/1
25 TABLET, FILM COATED ORAL EVERY 8 HOURS PRN
Status: DISCONTINUED | OUTPATIENT
Start: 2021-02-02 | End: 2021-02-05 | Stop reason: HOSPADM

## 2021-02-02 RX ORDER — MULTIPLE VITAMINS W/ MINERALS TAB 9MG-400MCG
1 TAB ORAL DAILY
Status: DISCONTINUED | OUTPATIENT
Start: 2021-02-02 | End: 2021-02-05 | Stop reason: HOSPADM

## 2021-02-02 RX ADMIN — MULTIPLE VITAMINS W/ MINERALS TAB 1 TABLET: TAB at 11:57

## 2021-02-02 RX ADMIN — LEVETIRACETAM 500 MG: 5 INJECTION INTRAVASCULAR at 08:20

## 2021-02-02 RX ADMIN — AMLODIPINE BESYLATE 5 MG: 5 TABLET ORAL at 08:20

## 2021-02-02 RX ADMIN — FAMOTIDINE 20 MG: 10 INJECTION INTRAVENOUS at 20:30

## 2021-02-02 RX ADMIN — NICARDIPINE HYDROCHLORIDE 1.25 MG/HR: 0.1 INJECTION, SOLUTION INTRAVENOUS at 02:38

## 2021-02-02 RX ADMIN — FAMOTIDINE 20 MG: 10 INJECTION INTRAVENOUS at 08:20

## 2021-02-02 RX ADMIN — METOPROLOL TARTRATE 25 MG: 25 TABLET, FILM COATED ORAL at 08:20

## 2021-02-02 RX ADMIN — SODIUM CHLORIDE 75 ML/HR: 4.5 INJECTION, SOLUTION INTRAVENOUS at 02:39

## 2021-02-02 RX ADMIN — LEVETIRACETAM 500 MG: 5 INJECTION INTRAVASCULAR at 21:52

## 2021-02-02 RX ADMIN — SODIUM CHLORIDE 75 ML/HR: 4.5 INJECTION, SOLUTION INTRAVENOUS at 08:36

## 2021-02-02 NOTE — CONSULTS
Diabetes Education    Patient Name:  Arlene Calderon  YOB: 1965  MRN: [id number]  Admit Date:  1/29/2021        Following for diabetes education consult per stroke protocol. Pt's A1c of 6.0 does fall within ADA diagnostic range for prediabetes; however, per chart review pt has been intermittently confused, will continue to follow. Thank you.       Electronically signed by:  Bre Hammer RN, [facility]  02/02/21 13:48 EST

## 2021-02-02 NOTE — PAYOR COMM NOTE
"Darlin Blanchard RN   Phone [phone]  Fax [phone]    ID #[id number]    Arlene Calderon (55 y.o. Female)     Date of Birth Social Security Number Address Home Phone MRN    1965  [address]   [place] [address] [phone] [id number]    Orthodox Marital Status          None        Admission Date Admission Type Admitting Provider Attending Provider Department, Room/Bed    1/29/21 Urgent Jose R Sanchez DO Meenach, Christopher Caleb, DO [facility] 2B ICU, N240/1    Discharge Date Discharge Disposition Discharge Destination                       Attending Provider: Jsoe R Sanchez DO    Allergies: No Known Allergies    Isolation: Contact Air   Infection: COVID (confirmed) (01/30/21)   Code Status: CPR    Ht: 157.5 cm (62.01\")   Wt: 60.6 kg (133 lb 9.6 oz)    Admission Cmt: None   Principal Problem: Acute left frontal ICH 1/29/2021. Smaller right parieto-occipital ICH also present (CMS/HCC) [I61.9]                 Active Insurance as of 1/29/2021     Patient has no active insurance coverage on file for 1/29/2021.          Emergency Contacts      (Rel.) Home Phone Work Phone Mobile Phone    CJAGUSTIN (Friend) [phone] -- --    pia calderon (Daughter) -- -- [phone]               History & Physical      Jose R Sanchez DO at 01/29/21 2003          Intensive Care Admission Note     Chief Complaint:  ICH (intracerebral hemorrhage) (CMS/HCC)    History of Present Illness     Patient is a 56 yo female with unknown PMH who presents as a transfer from [facility] with ongoing issues associated with acute ICH of the right parietal lobe. According to limited documentation, patient  friend accompanied her to the OSH after patient was found with right sided weakness, left lateral gaze and confusion. This friend became worried when the patient did not show up to plans they had previously made. She presented to the patients home " and found her with symptoms. EMS was called and she was brought to [facility] for evaluation. There patient was found to be hypertensive, temp 99.9, HR 77 and normal spo2. Significant labs were a glucose of 126 and rest of Chem panel WNL, INR 0.97, trop < 0.010, WBC 13.92 and Myoglobin low at 21.8. CT head showed large left frontal intraparenchymal hematoma with surrounding vasogenic edema and localized mass effect. NO significant midline shift. [facility] Telestroke team contacted and patient was transferred for higher level of care.    On arrival patient is    Problem List, Surgical History, Family, Social History, and ROS     Patient Active Problem List    Diagnosis   • *ICH (intracerebral hemorrhage) (CMS/[facility]) [I61.9]   • Leukocytosis [D72.829]     No past surgical history on file.    No Known Allergies  Current Facility-Administered Medications on File Prior to Encounter   Medication   • [DISCONTINUED] niCARdipine (CARDENE) 20 mg in 200 mL NS infusion   • [DISCONTINUED] sodium chloride 0.9 % flush 10 mL     No current outpatient medications on file prior to encounter.     MEDICATION LIST AND ALLERGIES REVIEWED.    No family history on file.  Social History     Tobacco Use   • Smoking status: Not on file   Substance Use Topics   • Alcohol use: Not on file   • Drug use: Not on file     Social History     Social History Narrative   • Not on file     FAMILY AND SOCIAL HISTORY REVIEWED.    Review of Systems   Unable to perform ROS: Mental status change     ALL OTHER SYSTEMS REVIEWED AND ARE NEGATIVE.    Physical Exam and Clinical Information   There were no vitals taken for this visit.  Physical Exam  Vitals signs and nursing note reviewed.   Constitutional:       General: She is not in acute distress.     Appearance: She is well-developed and normal weight. She is not ill-appearing or toxic-appearing.   HENT:      Head: Normocephalic and atraumatic.      Right Ear: External ear normal.      Left Ear: External ear normal.       Nose: Nose normal.      Mouth/Throat:      Mouth: Mucous membranes are moist.      Pharynx: Oropharynx is clear.   Eyes:      Conjunctiva/sclera: Conjunctivae normal.      Pupils: Pupils are equal, round, and reactive to light.   Neck:      Musculoskeletal: Normal range of motion and neck supple.   Cardiovascular:      Rate and Rhythm: Normal rate and regular rhythm.      Pulses: Normal pulses.      Heart sounds: Normal heart sounds. No murmur. No friction rub. No gallop.    Pulmonary:      Effort: Pulmonary effort is normal.      Breath sounds: Normal breath sounds. No wheezing or rales.   Abdominal:      General: Bowel sounds are normal. There is no distension.      Palpations: Abdomen is soft. There is no mass.      Tenderness: There is no abdominal tenderness.   Musculoskeletal: Normal range of motion.      Right lower leg: No edema.      Left lower leg: No edema.   Skin:     General: Skin is warm and dry.      Capillary Refill: Capillary refill takes less than 2 seconds.      Findings: No erythema.   Neurological:      Mental Status: She is alert.      Comments: Right-sided upper extremity weakness, right-sided neglect, and aphasia   Psychiatric:         Mood and Affect: Mood normal.         Behavior: Behavior normal.         Thought Content: Thought content normal.         Judgment: Judgment normal.         Results from last 7 days   Lab Units 01/29/21  1811   WBC 10*3/mm3 13.92*   HEMOGLOBIN g/dL 15.3   PLATELETS 10*3/mm3 359     Results from last 7 days   Lab Units 01/29/21  1811   SODIUM mmol/L 143   POTASSIUM mmol/L 4.4   CO2 mmol/L 26.6   BUN mg/dL 9   CREATININE mg/dL 0.67   GLUCOSE mg/dL 126*     Estimated Creatinine Clearance: 87.3 mL/min (by C-G formula based on SCr of 0.67 mg/dL).          No results found for: LACTATE         I reviewed the patient's results/ images and I agree with the reports.     Impression     ICH (intracerebral hemorrhage) (CMS/[facility])    Leukocytosis      Plan/Recommendations      55-year-old female with unknown past medical history who presented to the [facility] ICU on 1/29/2021 with intracranial hemorrhage.  No plans for surgical invention at this time.    1. Admit to ICU  2. Neurosurgery will watch but no surgical intervention needed at this time  3. CTA head and neck on arrival  4. BP goal < 140, Nicardipine ordered if needed  5. UDS on arrival  6. Reverse anticoagulants if necessary, unclear of PMH or use at this time  7. NPO until clear per dysphagia  8. Avoid sedatvies  9. SCDs for DVT ppx  10. Famotidine for PUD ppx  11. Replace electrolytes as needed  12. Supportive care    Critical Care time spent in direct patient care: 35 minutes (excluding procedure time, if applicable) including high complexity decision making to assess, manipulate, and support vital organ system failure in this individual who has impairment of one or more vital organ systems such that there is a high probability of imminent or life threatening deterioration in the patient's condition.      SOY Sanchez DO  Pulmonary and Critical Care Medicine  01/29/21 20:56 EST     CC: Maggi Talamantes APRN      Electronically signed by Jose R Sanchez DO at 01/29/21 2100         Facility-Administered Medications as of 2/2/2021   Medication Dose Route Frequency Provider Last Rate Last Admin   • acetaminophen (TYLENOL) suppository 650 mg  650 mg Rectal Q4H PRN Lidia Gomez APRN   650 mg at 01/30/21 1737   • amLODIPine (NORVASC) tablet 5 mg  5 mg Oral Q24H Chaka Gracia MD   5 mg at 02/02/21 0820   • [COMPLETED] barium sulfate (VARIBAR PUDDING) oral paste 20 mL  20 mL Oral Once in imaging Jose R Sanchez DO   20 mL at 02/01/21 1450   • [COMPLETED] barium sulfate (VARIBAR THIN LIQUID) oral suspension 100 mL  100 mL Oral Once in imaging Jose R Sanchez DO   100 mL at 02/01/21 1450   • [COMPLETED] barium sulfate oral suspension 10 mL  10 mL Oral Once in  imaging Jose R Sanchez, DO   10 mL at 02/01/21 1449   • [COMPLETED] barium sulfate oral suspension 50 mL  50 mL Oral Once in imaging Jose R Sanchez, DO   50 mL at 02/01/21 1450   • calcium gluconate 1 g in sodium chloride 0.9 % 100 mL IVPB  1 g Intravenous PRN Jose R Sanchez, DO        And   • calcium gluconate 6 g in sodium chloride 0.9 % 500 mL IVPB  6 g Intravenous PRN Jose R Sanchez, DO       • famotidine (PEPCID) injection 20 mg  20 mg Intravenous Q12H MeJose R nuñez, DO   20 mg at 02/02/21 0820   • [COMPLETED] gadobenate dimeglumine (MULTIHANCE) injection 11 mL  11 mL Intravenous Once in imaging Jose R Sanchez, DO   11 mL at 01/31/21 0215   • hydrALAZINE (APRESOLINE) tablet 25 mg  25 mg Oral Q8H PRN Chaka Gracia MD       • influenza vac split quad (FLUZONE,FLUARIX,AFLURIA,FLULAVAL) injection 0.5 mL  0.5 mL Intramuscular During Hospitalization Ebonie Hunt, ANNE       • [COMPLETED] iopamidol (ISOVUE-370) 76 % injection 100 mL  100 mL Intravenous Once in imaging Jose R Sanchez, DO   75 mL at 01/29/21 2145   • levETIRAcetam in NaCl 0.82% (KEPPRA) IVPB 500 mg  500 mg Intravenous Q12H Yoav Villa MD   500 mg at 02/02/21 0820   • Magnesium Sulfate 2 gram Bolus, followed by 8 gram infusion (total Mg dose 10 grams)- Mg less than or equal to 1mg/dL  2 g Intravenous PRN Jose R Sanchez, DO        Or   • Magnesium Sulfate 2 gram / 50mL Infusion (GIVE X 3 BAGS TO EQUAL 6GM TOTAL DOSE) - Mg 1.1 - 1.5 mg/dl  2 g Intravenous PRN Jose R Sanchez, DO        Or   • Magnesium Sulfate 4 gram infusion- Mg 1.6-1.9 mg/dL  4 g Intravenous PRN Jose R Sanchez, DO       • metoprolol tartrate (LOPRESSOR) tablet 25 mg  25 mg Oral Q12H Chaka Gracia MD   25 mg at 02/02/21 0820   • multivitamin with minerals 1 tablet  1 tablet Oral Daily Chaka Gracia MD       • niCARdipine (CARDENE) 20 mg in 200 mL NS  infusion  5-15 mg/hr Intravenous Titrated Raf Jiménez PA-C   Stopped at 02/02/21 0815   • potassium chloride 10 mEq in 100 mL IVPB  10 mEq Intravenous Q1H PRN Meenach, Christopher Efe, DO       • potassium phosphate 45 mmol in sodium chloride 0.9 % 500 mL infusion  45 mmol Intravenous PRN Meenach, Christopher Efe, DO        Or   • potassium phosphate 30 mmol in sodium chloride 0.9 % 250 mL infusion  30 mmol Intravenous PRN Meenach, Christopher Efe, DO        Or   • potassium phosphate 15 mmol in sodium chloride 0.9 % 100 mL infusion  15 mmol Intravenous PRN Meenach, Christopher Efe, DO        Or   • sodium phosphates 45 mmol in sodium chloride 0.9 % 250 mL IVPB  45 mmol Intravenous PRN Meenach, Christopher Efe, DO        Or   • sodium phosphates 30 mmol in sodium chloride 0.9 % 250 mL IVPB  30 mmol Intravenous PRN Meenach, Christopher Efe, DO        Or   • sodium phosphates 15 mmol in sodium chloride 0.9 % 250 mL IVPB  15 mmol Intravenous PRN Meenach, Torstenopher Efe, DO       • sodium chloride 0.45 % infusion  75 mL/hr Intravenous Continuous Nick Tsai MD 75 mL/hr at 02/02/21 0836 75 mL/hr at 02/02/21 0836        Physician Progress Notes (all)      Chaka Gracia MD at 02/01/21 1727          Intensivist Note     2/1/2021  Hospital Day: 3  * No surgery found *  ICU Stays Timeline            Hospital Admission: 01/29/21 2031 - Current  ICU stays: 1      In Date/Time Event Department ICU Stay Duration     01/29/21 2031 Admission [facility] 2B ICU 2 days 20 hours 56 minutes             Ms. Arlene Calderon, 55 y.o. female is followed for:    Acute left frontal ICH 1/29/2021. Smaller right parieto-occipital ICH also present (CMS/HCC)    COVID-19 virus detected at admission    Leukocytosis       SUBJECTIVE     57-year-old white female smoker found at home with right-sided weakness and left lateral gaze and confusion. EMS was called and transferred to [facility] 1/29/2021 where she was found to be  "hypertensive with a low-grade temp.  CT scan revealed large left frontal intraparenchymal hemorrhage with surrounding vasogenic edema and localized mass-effect. Initial NIH was 22 with a GCS of 4-3-6.  ICH volume of 18 and ICH score of 1. Was transferred to [facility] the evening of 1/29/2021 and neurosurgery saw no sign of aneurysmal cause of bleed. Was felt most likely to have a hemorrhagic conversion of stroke or hypertensive hemorrhage. Surgical intervention was not recommended and focus has been on blood pressure control.  In addition patient was started on Keppra 1/31/2021.  Subsequent to her admission patient was documented to have a positive COVID-19 PCR.  She was however asymptomatic other than a low-grade temp of 100.1 without any other symptoms such as nasal congestion cough, loss of taste or smell etc.    Interval history: Patient still with right visual field cut and right-sided weakness as well as some expressive aphasia with slow speech and dysarthria.  She can however make herself understood.  Speech evaluation was performed today and she has persistent moderate to severe dysphagia but speech feels she can tolerate a modified diet (puréed with some mashed and honey thickened liquids.  Patient has still been hypertensive and remains on Cardene.  She has been afebrile for over 24 hours, but WBC 16.26.  CRP less than 0.30, procalcitonin 0.03, ferritin 245.3, and .      ROS: Per subjective, all other systems reviewed and were negative.    The patient's relevant PMH, PSH, FH, and SH were reviewed and updated in Epic as appropriate. Allergies and Medications reviewed.    OBJECTIVE     /78   Pulse 70   Temp 98.3 °F (36.8 °C) (Oral)   Resp 20   Ht 157.5 cm (62.01\")   Wt 59 kg (130 lb 1.1 oz)   SpO2 95%   BMI 23.78 kg/m²           Flowsheet Rows      First Filed Value   Admission Height  157.5 cm (62.01\") Documented at 01/30/2021 0600   Admission Weight  58.8 kg (129 lb 10.1 oz) Documented at " 01/30/2021 0600        Intake & Output (last day)       01/31 0701 - 02/01 0700 02/01 0701 - 02/02 0700    I.V. (mL/kg) 1561.8 (26.5) 1164 (19.7)    IV Piggyback 100     Total Intake(mL/kg) 1661.8 (28.2) 1164 (19.7)    Urine (mL/kg/hr) 1475 (1) 1750 (2.8)    Total Output 1475 1750    Net +186.8 -586                Exam:  General Exam:  Well-developed middle-aged white female in NAD  HEENT: Pupils equal and reactive. Nose and throat clear.  Neck:                          Supple, no JVD, thyromegaly, or adenopathy  Lungs: Clear anteriorly and laterally  Cardiovascular: Regular rate and rhythm without murmurs or gallops.  68 bpm  Abdomen: Soft nontender without organomegaly or masses.   and rectal: Deferred.  Extremities: No cyanosis clubbing edema.  Neurologic:                 Right-sided weakness, speech dysarthric and slow but appropriate.  Right visual field cut.    Chest X-Ray 2/1/2021: Normal    INFUSIONS  niCARdipine, 5-15 mg/hr, Last Rate: 2.5 mg/hr (02/01/21 1450)  sodium chloride, 75 mL/hr, Last Rate: 75 mL/hr (02/01/21 0900)        Results from last 7 days   Lab Units 02/01/21  0502 01/31/21  0609 01/29/21  1811   WBC 10*3/mm3 16.26* 13.86* 13.92*   HEMOGLOBIN g/dL 14.4 15.9 15.3   HEMATOCRIT % 44.0 49.0* 45.1   PLATELETS 10*3/mm3 290 329 359     Results from last 7 days   Lab Units 02/01/21  0502 01/31/21  0609   SODIUM mmol/L 138 139   POTASSIUM mmol/L 4.2 3.7   CHLORIDE mmol/L 106 106   CO2 mmol/L 23.0 24.0   BUN mg/dL 21* 23*   CREATININE mg/dL 0.63 0.57   GLUCOSE mg/dL 93 131*   CALCIUM mg/dL 8.7 9.4     Results from last 7 days   Lab Units 02/01/21  0502 01/31/21  0609 01/30/21  0437   MAGNESIUM mg/dL 2.3 2.4 2.5   PHOSPHORUS mg/dL 3.3 3.2  --      Results from last 7 days   Lab Units 01/29/21  1811   ALK PHOS U/L 101   BILIRUBIN mg/dL 0.4   ALT (SGPT) U/L 17   AST (SGOT) U/L 19       No results found for: SEDRATE  No results found for: BNP  Lab Results   Component Value Date    CKTOTAL 56 01/29/2021     TROPONINT <0.010 01/29/2021     No results found for: TSH  No results found for: LACTATE  No results found for: CORTISOL      I reviewed the patient's results, images and medication.    Assessment/Plan   ASSESSMENT        Acute left frontal ICH 1/29/2021. Smaller right parieto-occipital ICH also present (CMS/HCC)    COVID-19 virus detected at admission    Leukocytosis      DISCUSSION: Continues to clinically improve but NIH is still 9. Has an abnormal dysphagia evaluation but speech has cleared her for a dysphagia 3 diet (puréed with some mashed and honey thickened liquids. Blood pressure remains a problem and still requiring Cardene. Will begin oral agents to see if can taper off the Cardene drip.  I still do not understand the bilateral nature of her ICH. Regarding the etiology of her bleed neurosurgery apparently feels this is either hemorrhagic conversion of a stroke or a hypertensive hemorrhage.  But why would it be bilateral unless it was embolic.  MRI did not suggest any evidence of a sinus thrombosis due to hypercoagulability and at the present time her COVID-19 infection is not associated with any significant increase in inflammatory markers, chest x-ray is clear, and gas exchange is normal thus patient does not require any therapy..    PLAN     1.  Begin metoprolol and amlodipine  2.  See if possible to taper off Cardene drip but make sure SBP less than 140  3.  Continue to follow for any potential complications from COVID-19 infection  4.  Mobilization  5.  Will transfer to the floor tomorrow if remains stable  6.  Will need inpatient rehab  7.  Defer to neurosurgery whether Keppra seizure prophylaxis is to continue as outpatient and for how long  8.  Echocardiogram    Plan of care and goals reviewed with multidisciplinary team at daily rounds.    I discussed the patient's findings and my recommendations with patient and nursing staff    Patient is critically ill due to intracranial hemorrhage and has  a high risk of life-threatening decline in condition.  They require continuous monitoring and frequent reassessment of condition for adjustment of management in order to lessen risk.  I visited the patient's bedside and interacted with nurse numerous times today.    Time spent Critical care 35 min (It does not include procedure time).    Electronically signed by Chaka Gracia MD, 02/01/21, 5:27 PM EST.   Pulmonary / Critical care medicine       Electronically signed by Chaka Gracia MD at 02/01/21 0639     Yoav Villa MD at 02/01/21 0959        HOD# : 3    No events last night      Acute left frontal ICH 1/29/2021 (CMS/HCC)    Leukocytosis    COVID-19 virus detected at admission      Temp:  [97.7 °F (36.5 °C)-98.4 °F (36.9 °C)] 97.9 °F (36.6 °C)  Heart Rate:  [54-91] 76  Resp:  [16-20] 20  BP: (116-171)/(50-98) 155/91  I/O last 3 completed shifts:  In: 3403.7 [I.V.:3303.7; IV Piggyback:100]  Out: 2125 [Urine:2125]  I/O this shift:  In: 347 [I.V.:347]  Out: 350 [Urine:350]  Vital signs were reviewed and documented in the chart      EXAM   Covid precautions in place reported stable per nursing staff      Assessment and plan    Monitor for deterioration-scan stable thus far MRI MRV unremarkable for anything other than hemorrhage    Long-term plan will be follow-up outpatient with repeat imaging to ensure resolution of hemorrhages    Continue blood pressure control    PT OT rehab    If stable next 24 to 48 hours and if Covid stable can consider transfer to floor and rehab options    Patient has social issues in terms of support will likely need inpatient options so I suspect in the context of her Covid infection a protracted hospitalization        Electronically signed by Yoav Villa MD at 02/01/21 1000     Nick Tsai MD at 01/31/21 1301          INTENSIVIST NOTE     Hospital Day: 2    Ms. Arlene Calderon, 55 y.o. female is followed for:   ICH       SUBJECTIVE     55-year-old female  "admitted in transfer on 1/29 from [facility] with acute left frontal ICH.  She was admitted to the ICU for blood pressure control.  Neurosurgery saw her in consultation and no intervention undertaken.  She was treated with Decadron and seizure prophylaxis.  Covid PCR screen was positive.    Interval history:    NIH scale decreased to 11.  Maximum temperature 100.1.  Fluid balance +1.8 L.  Up in chair.    The patient's relevant past medical, surgical and social history were reviewed and updated in Epic as appropriate.       OBJECTIVE     Vital Sign Min/Max for last 24 hours  Temp  Min: 97.5 °F (36.4 °C)  Max: 100.1 °F (37.8 °C)   BP  Min: 98/66  Max: 154/84   Pulse  Min: 66  Max: 102   Resp  Min: 16  Max: 18   SpO2  Min: 91 %  Max: 100 %   No data recorded   Weight  Min: 59 kg (130 lb 1.1 oz)  Max: 59 kg (130 lb 1.1 oz)      Intake/Output Summary (Last 24 hours) at 1/31/2021 1301  Last data filed at 1/31/2021 1200  Gross per 24 hour   Intake 2506.9 ml   Output 750 ml   Net 1756.9 ml      Flowsheet Rows      First Filed Value   Admission Height  157.5 cm (62.01\") Documented at 01/30/2021 0600   Admission Weight  58.8 kg (129 lb 10.1 oz) Documented at 01/30/2021 0600             01/30/21  0600 01/31/21  0600   Weight: 58.8 kg (129 lb 10.1 oz) 59 kg (130 lb 1.1 oz)            Objective:  General Appearance:  In no acute distress.    Vital signs: (most recent): Blood pressure 128/75, pulse 66, temperature 98.2 °F (36.8 °C), temperature source Oral, resp. rate 16, height 157.5 cm (62.01\"), weight 59 kg (130 lb 1.1 oz), SpO2 94 %.    HEENT: Normal HEENT exam.    Lungs:  Normal effort and normal respiratory rate.  Breath sounds clear to auscultation.  She is not in respiratory distress.  No rales, wheezes or rhonchi.    Heart: Normal rate.  Regular rhythm.  S1 normal and S2 normal.  No murmur, gallop or friction rub.   Chest: Symmetric chest wall expansion.   Abdomen: Abdomen is soft and non-distended.  Bowel sounds are normal.   " There is no abdominal tenderness.   There is no mass. There is no splenomegaly. There is no hepatomegaly.   Extremities: There is no deformity or dependent edema.    Neurological: (Drowsy but easily awakens).    Pupils:  Pupils are equal, round, and reactive to light.    Skin:  Warm and dry.            Interval: (Post MRI)  1a. Level of Consciousness: 0-->Alert, keenly responsive  1b. LOC Questions: 2-->Answers neither question correctly  1c. LOC Commands: 0-->Performs both tasks correctly  2. Best Gaze: 1-->Partial gaze palsy, gaze is abnormal in one or both eyes, but forced deviation or total gaze paresis is not present  3. Visual: 1-->Partial hemianopia  4. Facial Palsy: 1-->Minor paralysis (flattened nasolabial fold, asymmetry on smiling)  5a. Motor Arm, Left: 0-->No drift, limb holds 90 (or 45) degrees for full 10 secs  5b. Motor Arm, Right: 1-->Drift, limb holds 90 (or 45) degrees, but drifts down before full 10 secs, does not hit bed or other support  6a. Motor Leg, Left: 0-->No drift, leg holds 30 degree position for full 5 secs  6b. Motor Leg, Right: 1-->Drift, leg falls by the end of the 5-sec period but does not hit bed  7. Limb Ataxia: 0-->Absent  8. Sensory: 0-->Normal, no sensory loss  9. Best Language: 2-->Severe aphasia, all communication is through fragmentary expression, great need for inference, questioning, and guessing by the listener. Range of information that can be exchanged is limited, listener carries burden of. . . (see row details)  10. Dysarthria: 1-->Mild-to-moderate dysarthria, patient slurs at least some words and, at worst, can be understood with some difficulty  11. Extinction and Inattention (formerly Neglect): 1-->Visual, tactile, auditory, spatial, or personal inattention or extinction to bilateral simultaneous stimulation in one of the sensory modalities    Total (NIH Stroke Scale): 11    I reviewed the patient's new clinical results.  I reviewed the patient's new imaging  results/reports including actual images and agree with reports.      Chest X-Ray: No infiltrates    INFUSIONS  niCARdipine, 5-15 mg/hr, Last Rate: Stopped (01/31/21 1000)  sodium chloride, 75 mL/hr, Last Rate: 75 mL/hr (01/31/21 0609)        Results from last 7 days   Lab Units 01/31/21  0609 01/29/21  1811   WBC 10*3/mm3 13.86* 13.92*   HEMOGLOBIN g/dL 15.9 15.3   HEMATOCRIT % 49.0* 45.1   PLATELETS 10*3/mm3 329 359     Results from last 7 days   Lab Units 01/31/21  0609 01/30/21  0437 01/29/21  2151 01/29/21  1811   SODIUM mmol/L 139  --   --  143   POTASSIUM mmol/L 3.7 3.9 4.3 4.4   CHLORIDE mmol/L 106  --   --  105   CO2 mmol/L 24.0  --   --  26.6   BUN mg/dL 23*  --   --  9   GLUCOSE mg/dL 131*  --   --  126*   CREATININE mg/dL 0.57  --   --  0.67   MAGNESIUM mg/dL 2.4 2.5  --  2.3   CALCIUM mg/dL 9.4  --   --  9.7                 Mech Ventilation:      I reviewed the patient's medications.    Assessment/Plan   ASSESSMENT/PLAN     Active Hospital Problems    Diagnosis   • **ICH (intracerebral hemorrhage) (CMS/HCC)   • COVID-19 virus detected   • Leukocytosis       13. Left frontal and right parietal ICH:   CT angiogram reveals no significant vascular abnormalities.  MRI without evidence of neoplastic disease.  Neurosurgery following.  Currently off Cardene though available if needed per standard parameters.  Steroids discontinued per Dr. Villa  14. Covid positive: No need for oxygen and CT of the chest without infiltrates.  No specific intervention other than appropriate isolation at this point.    1. Blood pressure control per parameters  2. Monitor for any signs of compromise due to Covid  3. Seizure prophylaxis  4. Dysphagia evaluation     I discussed the patient's findings and my recommendations with patient and nursing staff     Plan of care and goals reviewed with multidisciplinary team at daily rounds.    High level of risk due to:  illness with threat to life or bodily function and abrupt change in  neurological status.    Nick Tsai MD  Pulmonary and Critical Care Medicine  01/31/21 13:01 EST         Electronically signed by Nick Tsai MD at 01/31/21 1303     Yoav Villa MD at 01/31/21 1057        HOD# : 2    No events last night  Patient with active Covid infection    ICH (intracerebral hemorrhage) (CMS/HCC)    Leukocytosis    COVID-19 virus detected      Temp:  [97.5 °F (36.4 °C)-100.1 °F (37.8 °C)] 98.2 °F (36.8 °C)  Heart Rate:  [] 72  Resp:  [16-18] 18  BP: ()/(53-92) 130/76  I/O last 3 completed shifts:  In: 2541 [I.V.:2541]  Out: 1225 [Urine:1225]  I/O this shift:  In: 444 [I.V.:444]  Out: 100 [Urine:100]  Vital signs were reviewed and documented in the chart      EXAM   Patient reportedly neurologically stable discussed Specifically with nurse    CT reviewed no change MRI reviewed no evidence of sinus thrombosis no evidence of stroke diffusion-weighted image or hemorrhage enlargement      Impression and plan    Patient status post left posterior frontal and right parietal hemorrhage in the context of Covid no evidence of systemic metastatic cancer no evidence of family or history suspect this is likely an infectious related etiology CTA demonstrates no evidence of AVM and or aneurysm    Plan will be for watchful waiting blood pressure control    Wean steroids today    We will consider diagnostic catheter angiogram in a delayed manner once Covid clears as outpatient/rehab    Continue blood pressure less than 140    Nurse knows to repeat CT and call us if there is any change in issues SCDs for DVT prophylaxis okay    I spent 30 minutes in the care of this patient with over half that time in the ICU bedside arena    We will DC steroids given the absence of clear-cut cranial metastatic disease    We will wean Keppra given the absence of episodic seizure-like activity              Electronically signed by Yoav Villa MD at 01/31/21 1107    "Nick Tsai MD at 01/30/21 1345          INTENSIVIST NOTE     Hospital Day: 1    Ms. Arlene Calderon, 55 y.o. female is followed for:   ICH       SUBJECTIVE     55-year-old female admitted in transfer on 1/29 from [facility] with acute left frontal ICH.  She was admitted to the ICU for blood pressure control.  Neurosurgery saw her in consultation and no intervention undertaken.  She was treated with Decadron and seizure prophylaxis.  Covid screen was positive.    Interval history:    NIH 14.  Covid screen discovered to be positive as above.  On no oxygen and CT of the chest without infiltrates.  Maximum temperature 100.    The patient's relevant past medical, surgical and social history were reviewed and updated in Epic as appropriate.       OBJECTIVE     Vital Sign Min/Max for last 24 hours  Temp  Min: 97.6 °F (36.4 °C)  Max: 100 °F (37.8 °C)   BP  Min: 96/47  Max: 180/83   Pulse  Min: 64  Max: 137   Resp  Min: 18  Max: 22   SpO2  Min: 94 %  Max: 98 %   No data recorded   Weight  Min: 58.8 kg (129 lb 10.1 oz)  Max: 59 kg (130 lb)      Intake/Output Summary (Last 24 hours) at 1/30/2021 1345  Last data filed at 1/30/2021 0900  Gross per 24 hour   Intake 574.1 ml   Output 575 ml   Net -0.9 ml      Flowsheet Rows      First Filed Value   Admission Height  157.5 cm (62.01\") Documented at 01/30/2021 0600   Admission Weight  58.8 kg (129 lb 10.1 oz) Documented at 01/30/2021 0600             01/30/21  0600   Weight: 58.8 kg (129 lb 10.1 oz)            Objective:  General Appearance:  In no acute distress.    Vital signs: (most recent): Blood pressure 154/84, pulse 79, temperature 99.1 °F (37.3 °C), temperature source Oral, resp. rate 18, height 157.5 cm (62.01\"), weight 58.8 kg (129 lb 10.1 oz), SpO2 98 %.    HEENT: Normal HEENT exam.    Lungs:  Normal effort and normal respiratory rate.  Breath sounds clear to auscultation.  She is not in respiratory distress.  No rales, wheezes or rhonchi.    Heart: Normal rate.  " Regular rhythm.  S1 normal and S2 normal.  No murmur, gallop or friction rub.   Chest: Symmetric chest wall expansion.   Abdomen: Abdomen is soft and non-distended.  Bowel sounds are normal.   There is no abdominal tenderness.   There is no mass. There is no splenomegaly. There is no hepatomegaly.   Extremities: There is no deformity or dependent edema.    Neurological: (Drowsy but easily awakens).    Pupils:  Pupils are equal, round, and reactive to light.    Skin:  Warm and dry.            Interval: (daily)  1a. Level of Consciousness: 1-->Not alert, but arousable by minor stimulation to obey, answer, or respond  1b. LOC Questions: 2-->Answers neither question correctly  1c. LOC Commands: 1-->Performs one task correctly  2. Best Gaze: 1-->Partial gaze palsy, gaze is abnormal in one or both eyes, but forced deviation or total gaze paresis is not present  3. Visual: 1-->Partial hemianopia  4. Facial Palsy: 1-->Minor paralysis (flattened nasolabial fold, asymmetry on smiling)  5a. Motor Arm, Left: 0-->No drift, limb holds 90 (or 45) degrees for full 10 secs  5b. Motor Arm, Right: 3-->No effort against gravity, limb falls  6a. Motor Leg, Left: 0-->No drift, leg holds 30 degree position for full 5 secs  6b. Motor Leg, Right: 0-->No drift, leg holds 30 degree position for full 5 secs  7. Limb Ataxia: 0-->Absent  8. Sensory: 1-->Mild-to-moderate sensory loss, patient feels pinprick is less sharp or is dull on the affected side, or there is a loss of superficial pain with pinprick, but patient is aware of being touched  9. Best Language: 1-->Mild-to-moderate aphasia, some obvious loss of fluency or facility of comprehension, without significant limitation on ideas expressed or form of expression. Reduction of speech and/or comprehension, however, makes conversation. . . (see row details)  10. Dysarthria: 1-->Mild-to-moderate dysarthria, patient slurs at least some words and, at worst, can be understood with some  difficulty  11. Extinction and Inattention (formerly Neglect): 1-->Visual, tactile, auditory, spatial, or personal inattention or extinction to bilateral simultaneous stimulation in one of the sensory modalities    Total (NIH Stroke Scale): 14    I reviewed the patient's new clinical results.  I reviewed the patient's new imaging results/reports including actual images and agree with reports.      Chest X-Ray: No infiltrates    INFUSIONS  niCARdipine, 5-15 mg/hr, Last Rate: 5 mg/hr (01/30/21 1322)        Results from last 7 days   Lab Units 01/29/21  1811   WBC 10*3/mm3 13.92*   HEMOGLOBIN g/dL 15.3   HEMATOCRIT % 45.1   PLATELETS 10*3/mm3 359     Results from last 7 days   Lab Units 01/30/21  0437 01/29/21  2151 01/29/21  1811   SODIUM mmol/L  --   --  143   POTASSIUM mmol/L 3.9 4.3 4.4   CHLORIDE mmol/L  --   --  105   CO2 mmol/L  --   --  26.6   BUN mg/dL  --   --  9   GLUCOSE mg/dL  --   --  126*   CREATININE mg/dL  --   --  0.67   MAGNESIUM mg/dL 2.5  --  2.3   CALCIUM mg/dL  --   --  9.7                 Mech Ventilation:      I reviewed the patient's medications.    Assessment/Plan   ASSESSMENT/PLAN     Active Hospital Problems    Diagnosis   • **ICH (intracerebral hemorrhage) (CMS/HCC)   • Leukocytosis       15. Left frontal ICH: CT scan of the head shows relative stability this morning.  CT angiogram reveals no significant vascular abnormalities.  MRI pending.  Neurosurgery following.  Currently off Cardene though available if needed per standard parameters.  16. Covid positive: No need for oxygen and CT of the chest without infiltrates.  No specific intervention other than appropriate isolation at this point.    5. Await reports of imaging studies  6. Blood pressure control per parameters  7. Monitor for any signs of compromise due to Covid  8. Steroids and seizure prophylaxis  9. Maintenance fluid while n.p.o.     I discussed the patient's findings and my recommendations with patient and nursing staff      Plan of care and goals reviewed with multidisciplinary team at daily rounds.    High level of risk due to:  illness with threat to life or bodily function and abrupt change in neurological status.    Nick Tsai MD  Pulmonary and Critical Care Medicine  01/30/21 13:45 EST         Electronically signed by Nick Tsai MD at 01/30/21 1351     Raf Jiménez PA-C at 01/30/21 1321     Attestation signed by Yoav Villa MD at 01/30/21 7804    I have reviewed this documentation and agree.      Came by to see patient she is on full Covid precautions.  No evidence of neurologic changes through the day    No change in CT today with a 2 different parietal E issues these are small hemorrhagic lesions and I am concerned about underlying lesions    Plan will be MRI when stable with and without contrast-we will consider MR venogram as well to exclude sinus thrombosis given hypercoagulable state from Covid infection    Otherwise this is nonsurgical at present                      HOD# : 1    No events last night  Patient doing a little bit better this morning but after stimulation of getting CT scan she is about back at baseline.  Upon my evaluation she will answer simple yes and no questions about being in [place] in having her birthday in September etc.  Patient will move all extremities but is weaker in the right upper extremity.     Patient's neuro exam is stable.    Patient did test positive for Covid and precautions are being taken.       ICH (intracerebral hemorrhage) (CMS/HCC)    Leukocytosis      Temp:  [97.6 °F (36.4 °C)-100 °F (37.8 °C)] 99.1 °F (37.3 °C)  Heart Rate:  [] 79  Resp:  [18-22] 18  BP: ()/() 154/84  I/O last 3 completed shifts:  In: 574.1 [I.V.:574.1]  Out: 500 [Urine:500]  I/O this shift:  In: -   Out: 75 [Urine:75]  Vital signs were reviewed and documented in the chart      EXAM   Body mass index is 23.7 kg/m².      Patient appeared in good  neurologic function with normal comprehension   CN grossly intact  Moves all extremities to command  Patient will open eyes but has left gaze preference.  Patient will  well with left hand lesser so on right.  3 out of 5 strength on right upper extremity moves legs equally and was able to get herself on a bedpan this morning.    DIAGNOSIS    Left frontal intracranial hemorrhage  Covid positive  1. Impaired functional mobility, balance, gait, and endurance          PLAN    Patient seems to be improving after steroid but after stimulation her blood pressure did increase after CT scan and she is a little bit less responsive to questioning.  Patient is at neurologic baseline discussed fluctuation with nursing staff who will keep a close eye on her.    CT chest abdomen pelvis was performed this has not been read yet but CT of the head shows stability of hemorrhage.    We will await MRI of the brain because on the second CT scan there still is the right posterior parietal lesion that it is yet to be identified but there is no change on 2 separate scans.            Electronically signed by Yoav Villa MD at 01/30/21 2240          Consult Notes (all)      Raf Jiménez PA-C at 01/29/21 2137          Consults  Patient was examined around 2115 hrs.  Referring Provider: 2B intensivist    Patient Care Team:  Maggi Talamantes APRN as PCP - General (Family Medicine)  Erwin Morataya MD as Consulting Physician (General Surgery)    Chief Complaint: Right-sided weakness, left lateral gaze, confusion    Subjective .     History of present illness:       Patient is a 55-year-old female who initially presented to [facility] after being found down by her friend.  Patient does not have a time of onset her last known well but was transferred to [facility] for evaluation per stroke protocol CT scan revealed a sizable left posterior frontal hemorrhage.     Dr. Pimentel in the ER at [facility]  contacted Dr. Lewis via telehealth who accepted transfer.  Patient was brought to [facility] where CTA of the head neck were performed to ensure not aneurysmal bleed.  It also looks as though there is no large vessel occlusion.     Patient cannot provide any history but will answer simple yes and no questions.     Upon arrival patient has right upper extremity weakness but is able to move all extremities spontaneously.  Patient's weakness spares her right leg and she is in discernibly weak in the right leg as compared to left.  Patient does not cooperate with right upper extremity exam and will not give  strength or tricep extension bicep flexion.     Initial NIH at [place] was 22 with a GCS of 4-3-6.  ICH volume of 18 with ICH score of 1.    Review of Systems  Unable to obtain secondary to patient's neurocognitive status  History  No past medical history on file., No past surgical history on file., No family history on file.,   Social History     Tobacco Use   • Smoking status: Not on file   Substance Use Topics   • Alcohol use: Not on file   • Drug use: Not on file     No existing history information found.  No existing history information found.  No existing history information found.    ,   No medications prior to admission.   , Scheduled Meds:  famotidine, 20 mg, Intravenous, Q12H  [COMPLETED] iopamidol, 100 mL, Intravenous, Once in imaging    , Continuous Infusions:  niCARdipine, 5-15 mg/hr, Last Rate: 5 mg/hr (01/29/21 2100)    , PRN Meds:  •  calcium gluconate IVPB **AND** calcium gluconate IVPB **AND** Calcium, Ionized  •  magnesium sulfate **OR** magnesium sulfate **OR** magnesium sulfate  •  potassium chloride  •  potassium phosphate infusion greater than 15 mMoles **OR** potassium phosphate infusion greater than 15 mMoles **OR** potassium phosphate **OR** sodium phosphate IVPB **OR** sodium phosphate IVPB **OR** sodium phosphate IVPB and Allergies:  Patient has no known allergies.   SMOKING  STATUS: Unable to obtain  Objective     Vital Signs   Temp:  [99.9 °F (37.7 °C)] 99.9 °F (37.7 °C)  Heart Rate:  [72-95] 95  Resp:  [22] 22  BP: (143-180)/(73-86) 143/73  There is no height or weight on file to calculate BMI.    Physical Exam:     There is no height or weight on file to calculate BMI.    Patient is alert and oriented to place cannot tell me her name.  Patient will answer yes/no questions.  Patient has right upper extremity weakness with a fairly prominent right upper extremity motor drift but is 4 out of 5 strength against gravity.  Bilateral lower extremities are intact she is able to move them appropriately with no difficulty with rapid alternating movements or heel-to-shin test.    Pupils are equal and reactive to light patient would not participate with extraocular movement exam but has a leftward gaze preference with right-sided neglect.       Results Review:   I reviewed the patient's new imaging results and agree with the interpretation.  Discussed with Dr. Villa.  CTA of the head neck revealed no large vessel occlusion or vascular malformation that could account for bleed/hemorrhagic conversion.    On my calculation of the intracranial hemorrhage volume this was about an 18 cc bleed.    Assessment/Plan       ICH (intracerebral hemorrhage) (CMS/HCC)    Leukocytosis    I reviewed images with Dr. Villa no sign of aneurysmal cause for bleed.  This is likely a hemorrhagic conversion of stroke and/or hypertensive hemorrhage.  Also on the differential could be a hemorrhagic met.  We will get an MRI with and without contrast to delineate.  Patient will be placed on Decadron 4 mg every 6 hours as well as 750 mg of Keppra twice daily for seizure prophylaxis.    We will await urine drug screen.     Patient will likely fluctuate overnight have discussed this with the nurse.    No role for surgical intervention.    I discussed the patients findings and my recommendations with patient, nursing staff and  consulting provider    Raf Jiménez PA-C  01/29/21  21:38 EST    Time: 60 minutes            Electronically signed by Raf Jiménez PA-C at 01/30/21 8213

## 2021-02-02 NOTE — PLAN OF CARE
Goal Outcome Evaluation:  Plan of Care Reviewed With: patient     Outcome Summary: Pt has remained stable overnight. Weaning Cardene gtt to keep SBP<140. Inital order to start metoprolol 25 mg po, this was not given due to bradycardia. Does have Norvasc ordered in the am. Afebrile. Pt is awake, very alert, cooperative, with slurred/garbled speech, and mild dysarthria noted. Follows all commands and BARBOZA's, Right UE does to seem to be slightly weaker than left. No drift noted. Does have right visual field cut and neglect. Pt was able to tell me her name, she was in the hospital, and that she had a stroke. Pt steady on her feet, assist of 1 to bedside commode, voids without difficulty, UOP adequate. See flow sheet for further details.

## 2021-02-02 NOTE — THERAPY TREATMENT NOTE
Patient Name: Arlene Calderon  : 1965    MRN: [id number]                              Today's Date: 2021       Admit Date: 2021    Visit Dx:     ICD-10-CM ICD-9-CM   1. Impaired functional mobility, balance, gait, and endurance  Z74.09 V49.89   2. Dysphagia, unspecified type  R13.10 787.20   3. 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 No pertinent past medical history.  History reviewed. No pertinent surgical history.  General Information     Row Name 2134          OT Time and Intention    Document Type  therapy note (daily note)  -CL     Mode of Treatment  occupational therapy  -CL     Row Name 2134          General Information    Existing Precautions/Restrictions  fall;other (see comments) R sided weakness/inattention, R visual field deficit  -CL     Barriers to Rehab  medically complex;visual deficit  -CL     Row Name 2134          Cognition    Orientation Status (Cognition)  oriented x 3  -CL     Row Name 2134          Safety Issues, Functional Mobility    Impairments Affecting Function (Mobility)  balance;cognition;endurance/activity tolerance;strength;coordination;grasp;visual/perceptual;postural/trunk control;motor control  -CL     Cognitive Impairments, Mobility Safety/Performance  attention;awareness, need for assistance;impulsivity;insight into deficits/self-awareness;safety precaution awareness  -CL       User Key  (r) = Recorded By, (t) = Taken By, (c) = Cosigned By    Initials Name Provider Type    CL Jade Neely OT Occupational Therapist          Mobility/ADL's     Row Name 21 0935          Bed Mobility    Bed Mobility  supine-sit;sit-supine  -CL     Supine-Sit [place] (Bed Mobility)  supervision;verbal cues  -CL      Sit-Supine [place] (Bed Mobility)  supervision;verbal cues  -CL     Assistive Device (Bed Mobility)  bed rails;draw sheet;head of bed elevated  -CL     Row Name 02/02/21 0935          Transfers    Transfers  sit-stand transfer;toilet transfer  -CL     Sit-Stand [place] (Transfers)  contact guard;verbal cues  -CL     [place] Level (Toilet Transfer)  contact guard;verbal cues  -CL     Assistive Device (Toilet Transfer)  commode, bedside without drop arms  -CL     Row Name 02/02/21 0935          Toilet Transfer    Type (Toilet Transfer)  stand pivot/stand step  -CL     Row Name 02/02/21 0935          Activities of Daily Living    BADL Assessment/Intervention  grooming;upper body dressing;feeding;toileting  -CL     Row Name 02/02/21 0935          Grooming Assessment/Training    [place] Level (Grooming)  wash face, hands;supervision  -CL     Position (Grooming)  sitting up in bed  -CL     Row Name 02/02/21 0935          Upper Body Dressing Assessment/Training    [place] Level (Upper Body Dressing)  don;doff;moderate assist (50% patient effort)  -CL     Position (Upper Body Dressing)  sitting up in bed  -CL     Comment (Upper Body Dressing)  hosp gown, increased asssist d/t lines and R sided inattention  -CL     Row Name 02/02/21 0935          Toileting Assessment/Training    [place] Level (Toileting)  adjust/manage clothing;perform perineal hygiene;supervision  -CL     Position (Toileting)  supported sitting;supported standing  -CL     Row Name 02/02/21 0935          Self-Feeding Assessment/Training    [place] Level (Feeding)  scoop food and bring to mouth;supervision;verbal cues  -CL     Position (Self-Feeding)  sitting up in bed  -CL     Comment (Feeding)  simulated, educated on AE use and positioning of plate to improve independence  -CL       User Key  (r) = Recorded By, (t) = Taken By, (c) = Cosigned By    Initials Name Provider Type    Jade Mckeon OT Occupational Therapist         Obj/Interventions     Row Name 02/02/21 0937          Balance    Balance Assessment  sitting static balance;sitting dynamic balance;standing static balance;standing dynamic balance  -CL     Static Sitting Balance  WFL;sitting, edge of bed  -CL     Dynamic Sitting Balance  WFL;sitting, edge of bed  -CL     Static Standing Balance  WFL;supported  -CL     Dynamic Standing Balance  mild impairment;supported  -CL     Comment, Balance  Pt performed visual scanning for ADL items w/ reaching across midline for item retrieval  -CL       User Key  (r) = Recorded By, (t) = Taken By, (c) = Cosigned By    Initials Name Provider Type    CL Jade Neely, ALICIA Occupational Therapist        Goals/Plan    No documentation.       Clinical Impression     Row Name 02/02/21 0938          Pain Scale: Numbers Pre/Post-Treatment    Pretreatment Pain Rating  0/10 - no pain  -CL     Posttreatment Pain Rating  0/10 - no pain  -CL     Row Name 02/02/21 0938          Plan of Care Review    Plan of Care Reviewed With  patient  -CL     Progress  improving  -CL     Outcome Summary  Pt demo improved independence by performing BSC t/f w/ CGA and toileting ADLs w/ Supervision. Pt educated on AE use and compensatory strategies to improve self-feeding, conts to be limited d/t R visual field deficits. Recommend cont skilled IPOT POC. Recommend pt DC to IP rehab.  -CL     Row Name 02/02/21 0938          Therapy Plan Review/Discharge Plan (OT)    Anticipated Discharge Disposition (OT)  inpatient rehabilitation facility  -CL     Row Name 02/02/21 0938          Vital Signs    Pre Systolic BP Rehab  124  -CL     Pre Treatment Diastolic BP  79  -CL     Post Systolic BP Rehab  116  -CL     Post Treatment Diastolic BP  73  -CL     Pretreatment Heart Rate (beats/min)  60  -CL     Posttreatment Heart Rate (beats/min)  60  -CL     Pre SpO2 (%)  96  -CL     O2 Delivery Pre Treatment  room air  -CL     Post SpO2 (%)  94  -CL     O2 Delivery Post Treatment  room air   -CL     Pre Patient Position  Supine  -CL     Intra Patient Position  Standing  -CL     Post Patient Position  Supine  -CL     Row Name 02/02/21 0938          Positioning and Restraints    Pre-Treatment Position  in bed  -CL     Post Treatment Position  bed  -CL     In Bed  notified nsg;fowlers;call light within reach;encouraged to call for assist;exit alarm on;side rails up x3  -CL       User Key  (r) = Recorded By, (t) = Taken By, (c) = Cosigned By    Initials Name Provider Type    Jade Mckeon OT Occupational Therapist        Outcome Measures     Row Name 02/02/21 0941          How much help from another is currently needed...    Putting on and taking off regular lower body clothing?  2  -CL     Bathing (including washing, rinsing, and drying)  2  -CL     Toileting (which includes using toilet bed pan or urinal)  3  -CL     Putting on and taking off regular upper body clothing  2  -CL     Taking care of personal grooming (such as brushing teeth)  3  -CL     Eating meals  3  -CL     AM-PAC 6 Clicks Score (OT)  15  -CL     Row Name 02/02/21 0941          Modified [place] Scale    Modified [place] Scale  3 - Moderate disability.  Requiring some help, but able to walk without assistance.  -CL       User Key  (r) = Recorded By, (t) = Taken By, (c) = Cosigned By    Initials Name Provider Type    Jade Mckeon OT Occupational Therapist        Occupational Therapy Education                 Title: PT OT SLP Therapies (In Progress)     Topic: Occupational Therapy (In Progress)     Point: ADL training (In Progress)     Description:   Instruct learner(s) on proper safety adaptation and remediation techniques during self care or transfers.   Instruct in proper use of assistive devices.              Learning Progress Summary           Patient Acceptance, E, NR by CL at 2/2/2021 0942    Acceptance, E, NR by CL at 2/1/2021 1210    Acceptance, E, NR by CS at 1/31/2021 1154                   Point: Home exercise program (Not  Started)     Description:   Instruct learner(s) on appropriate technique for monitoring, assisting and/or progressing therapeutic exercises/activities.              Learner Progress:  Not documented in this visit.          Point: Precautions (In Progress)     Description:   Instruct learner(s) on prescribed precautions during self-care and functional transfers.              Learning Progress Summary           Patient Acceptance, E, NR by CL at 2/2/2021 0942    Acceptance, E, NR by CL at 2/1/2021 1210    Acceptance, E, NR by CS at 1/31/2021 1154    Acceptance, E, NR by CS at 1/30/2021 0927                   Point: Body mechanics (In Progress)     Description:   Instruct learner(s) on proper positioning and spine alignment during self-care, functional mobility activities and/or exercises.              Learning Progress Summary           Patient Acceptance, E, NR by CL at 2/2/2021 0942    Acceptance, E, NR by CL at 2/1/2021 1210    Acceptance, E, NR by CS at 1/31/2021 1154    Acceptance, E, NR by CS at 1/30/2021 0927                               User Key     Initials Effective Dates Name Provider Type Discipline     04/03/18 -  Jade Neely, OT Occupational Therapist OT    CS 10/21/20 -  Sudhir Ortega OT Occupational Therapist OT              OT Recommendation and Plan     Plan of Care Review  Plan of Care Reviewed With: patient  Progress: improving  Outcome Summary: Pt demo improved independence by performing BSC t/f w/ CGA and toileting ADLs w/ Supervision. Pt educated on AE use and compensatory strategies to improve self-feeding, conts to be limited d/t R visual field deficits. Recommend cont skilled IPOT POC. Recommend pt DC to IP rehab.     Time Calculation:   Time Calculation- OT     Row Name 02/02/21 0942             Time Calculation- OT    OT Start Time  0859  -      OT Received On  02/02/21  -      OT Goal Re-Cert Due Date  02/09/21  -         Timed Charges    50782 - OT Therapeutic Activity  Minutes  12  -CL      55081 - OT Self Care/Mgmt Minutes  18  -CL        User Key  (r) = Recorded By, (t) = Taken By, (c) = Cosigned By    Initials Name Provider Type    CL Jade Neely OT Occupational Therapist        Therapy Charges for Today     Code Description Service Date Service Provider Modifiers Qty    40805801735 HC OT THERAPEUTIC ACT EA 15 MIN 2/1/2021 Jdae Neely OT GO 1    67715738235 HC OT SELF CARE/MGMT/TRAIN EA 15 MIN 2/1/2021 Jade Neely OT GO 1    70126575544 HC OT THERAPEUTIC ACT EA 15 MIN 2/2/2021 Jade Neely OT GO 1    80437997944 HC OT SELF CARE/MGMT/TRAIN EA 15 MIN 2/2/2021 Jade Neely OT GO 1               Jade Neely OT  2/2/2021

## 2021-02-02 NOTE — PROGRESS NOTES
HOD# : 4        Acute left frontal ICH 1/29/2021. Smaller right parieto-occipital ICH also present (CMS/HCC)    Leukocytosis    COVID-19 virus detected at admission      Temp:  [96.7 °F (35.9 °C)-98.3 °F (36.8 °C)] 97.6 °F (36.4 °C)  Heart Rate:  [] 51  Resp:  [16-20] 18  BP: ()/(55-88) 120/87  I/O last 3 completed shifts:  In: 3153.8 [I.V.:2953.8; IV Piggyback:200]  Out: 4750 [Urine:4750]  I/O this shift:  In: 582.4 [I.V.:482.4; IV Piggyback:100]  Out: 450 [Urine:450]  Vital signs were reviewed and documented in the chart      EXAM   Patient reported with stable word finding difficulty per nursing and charting patient with active pulmonary Covid infection        Impression plan    Patient with ICH-attempted to call Tawana friend her daughter is in [place] to update patient there is no role for surgical intervention    Patient will need follow-up with repeat imaging and MRI repeat with and without contrast in approximately 6 to 8 weeks    Patient needs rehab    No role for antiplatelet agents or anticoagulants please in this patient    Blood pressure control

## 2021-02-02 NOTE — PROGRESS NOTES
"Clinical Nutrition Note      Patient Name: Arlene Calderon  MRN: [id number]  Admission date: 1/29/2021      Multidisciplinary Rounds    Additional information obtained during MDR:  RN reports pt is off cardene w/ addition of meds; eating well. Plan for transfer to floor. Multi-vitamin and mineral supplement provided w/ hx of gastric sleeve.    Current diet: Diet Dysphagia; III - Pureed With Some Mashed; Honey Thick; No Straws; Cardiac    Oral Nutrition Supplement:     Pertinent medical data reviewed:  No nutrition risk identified on nursing screen; MST score \"0\"  Intake data reviewed :2 meals consumed w/  An average consumption of ~60%    Intervention:  Spoke w/ pt via phone re: dysphagia diet and preferences; she had no request at this time. She does confirm she has had a gastric sleeve but follows no special dietary guidelines for the sleeve pta.  Plan of care and goals reviewed    Monitor:  RD to follow per protocol      Jenny Willis MS,RD,LD  02/02/21 13:32 EST  Time: 15  mins       "

## 2021-02-02 NOTE — THERAPY TREATMENT NOTE
Acute Care - Speech Language Pathology Treatment Note  [facility]     Patient Name: Arlene Calderon  : 1965  MRN: [id number]  Today's Date: 2021               Admit Date: 2021     Visit Dx:    ICD-10-CM ICD-9-CM   1. Nontraumatic cortical hemorrhage of cerebral hemisphere, unspecified laterality (CMS/HCC)  I61.1 431   2. Impaired functional mobility, balance, gait, and endurance  Z74.09 V49.89   3. Oropharyngeal dysphagia  R13.12 787.22   4. 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 No pertinent past medical history.  History reviewed. No pertinent surgical history.     SLP EVALUATION (last 72 hours)      SLP SLC Evaluation     Row Name 21 1100 21 0910                Communication Assessment/Intervention    Document Type  therapy note (daily note)  -AC  --       Subjective Information  no complaints  -AC  --       Patient Observations  alert;cooperative  -AC  --       Patient/Family/Caregiver Comments/Observations  No family present.  -AC  --       Care Plan Review  evaluation/treatment results reviewed;care plan/treatment goals reviewed;risks/benefits reviewed;current/potential barriers reviewed;patient/other agree to care plan  -AC  --       Patient Effort  good  -AC  --          Pain Scale: Numbers Pre/Post-Treatment    Pretreatment Pain Rating  0/10 - no pain  -AC  --       Posttreatment Pain Rating  0/10 - no pain  -AC  --          Comprehension Assessment/Intervention    Comprehension Assessment/Intervention  --  Auditory Comprehension  -SM          Auditory Comprehension Assessment/Intervention    Answers Questions (Communication)  --  yes/no;wh questions;simple;mild impairment;moderate impairment  -SM       Able to Follow Commands (Communication)  --   1-step;mild impairment;moderate impairment  -SM          Expression Assessment/Intervention    Expression Assessment/Intervention  --  verbal expression  -          Verbal Expression Assessment/Intervention    Phrase Completion  --  automatic/predictable;mild impairment  -SM       Confrontational Naming  --  high frequency;moderate impairment  -SM       Spontaneous/Functional Words  --  simple;mild impairment  -SM       Sentence Formulation  --  simple;moderate impairment  -SM          Motor Speech Assessment/Intervention    Motor Speech Function  --  mild impairment;moderate impairment  -SM       Characteristics Consistent with Dysarthria  --  other (see comments)  -       Motor Speech, Comment  --  imprecise articulation  -          Cognitive Assessment Intervention- SLP    Orientation Status (Cognition)  --  person;WFL;place;time;situation;moderate impairment  -       Problem Solving (Cognitive)  --  simple;mild impairment;moderate impairment  -       Right Hemisphere Function  --  other (see comments) poor eye contact/attending to SLP  -          SLP Clinical Impressions    Daily Summary of Progress (SLP)  progress toward functional goals as expected  -  --       SLP Diagnosis  --  Moderate cognitive-communication deficits  -       Rehab Potential/Prognosis  --  good  -[facility] Criteria for Skilled Therapy Interventions Met  --  yes  -       Functional Impact  --  difficulty communicating wants, needs  -       Plan for Continued Treatment (SLP)  Pt cont to exhibit aphasia affecting auditory comprehension and more so verbal expression, as well as dysarthria and cognitive-linguistic deficits. Pt able to follow simple commands, but had more difficulty w/ complex/multi-step commands. Also difficulty w/ more complex y/n questions. Pt reponded well to semantic cues when experiencing word finding difficulty. Pt appeared to be tolerating items on allowed diet w/o s/sxs aspiration--pudding and  honey-thick liquids via cup--but needs frequent cues to use compensatory strategies (small bites/sips, alternate bites/sips, multiple swallows per bolus). Pt would benefit from cont'd SLP tx for cognitive-linguistic deficits, dysarthria, aphasia, and oropharyngeal dysphagia.  -AC  --          Recommendations    Therapy Frequency (SLP SLC)  5 days per week  -AC  5 days per week  -       Predicted Duration Therapy Intervention (Days)  until discharge  -AC  until discharge  -       Anticipated Discharge Disposition (SLP)  inpatient rehabilitation facility;anticipate therapy at next level of care  -  inpatient rehabilitation facility;anticipate therapy at next level of care  -         User Key  (r) = Recorded By, (t) = Taken By, (c) = Cosigned By    Initials Name Effective Dates    Adrienne France, MS CCC-SLP 08/09/20 -     Neela Mai, MS CCC-SLP 07/27/17 -              EDUCATION  The patient has been educated in the following areas:     Cognitive Impairment Communication Impairment Dysphagia (Swallowing Impairment) Oral Care/Hydration Modified Diet Instruction.    SLP Recommendation and Plan              Anticipated Discharge Disposition (SLP): inpatient rehabilitation facility, anticipate therapy at next level of care        Predicted Duration Therapy Intervention (Days): until discharge  Daily Summary of Progress (SLP): progress toward functional goals as expected  Plan for Continued Treatment (SLP): Pt cont to exhibit aphasia affecting auditory comprehension and more so verbal expression, as well as dysarthria and cognitive-linguistic deficits. Pt able to follow simple commands, but had more difficulty w/ complex/multi-step commands. Also difficulty w/ more complex y/n questions. Pt reponded well to semantic cues when experiencing word finding difficulty. Pt appeared to be tolerating items on allowed diet w/o s/sxs aspiration--pudding and honey-thick liquids via cup--but needs frequent cues to  use compensatory strategies (small bites/sips, alternate bites/sips, multiple swallows per bolus). Pt would benefit from cont'd SLP tx for cognitive-linguistic deficits, dysarthria, aphasia, and oropharyngeal dysphagia.       Plan of Care Reviewed With: patient  Progress: improving      SLP GOALS     Row Name 02/02/21 1100 02/01/21 1420 01/31/21 0910       Oral Nutrition/Hydration Goal 1 (SLP)    Oral Nutrition/Hydration Goal 1, SLP  LTG: Pt will tolerate modified po diet of puree w/ honey thick liquids w/o overt s/s of aspiraiton/distress w/ 100% acc given min cues  -AC  LTG: Pt will tolerate modified po diet of puree w/ honey thick liquids w/o overt s/s of aspiraiton/distress w/ 100% acc given min cues  -CJ  --    Time Frame (Oral Nutrition/Hydration Goal 1, SLP)  by discharge  -AC  by discharge  -CJ  --    Barriers (Oral Nutrition/Hydration Goal 1, SLP)  No overt clinical s/sxs aspiration w/ pudding or honey-thick liquid via cup.  -AC  --  --    Progress/Outcomes (Oral Nutrition/Hydration Goal 1, SLP)  good progress toward goal  -AC  --  --       Oral Nutrition/Hydration Goal 2 (SLP)    Oral Nutrition/Hydration Goal 2, SLP  STG: Pt will accept trials of nectar thick liquids, soft solids w/o overt s/s of aspiration/distress w/ 90% acc w/o cues to determine pt readiness to participate in instrumental dysphagia re-evaluation.  -AC  STG: Pt will accept trials of nectar thick liquids, soft solids w/o overt s/s of aspiration/distress w/ 90% acc w/o cues to determine pt readiness to participate in instrumental dysphagia re-evaluation.  -CJ  --    Time Frame (Oral Nutrition/Hydration Goal 2, SLP)  short term goal (STG)  -AC  short term goal (STG)  -CJ  --    Progress/Outcomes (Oral Nutrition/Hydration Goal 2, SLP)  goal ongoing  -AC  --  --       Labial Strengthening Goal 1 (SLP)    Activity (Labial Strengthening Goal 1, SLP)  --  increase labial tone  -CJ  --    Increase Labial Tone  labial resistance exercises;swallow  trials  -AC  labial resistance exercises;swallow trials  -CJ  --    [place]/Accuracy (Labial Strengthening Goal 1, SLP)  with moderate cues (50-74% accuracy)  -AC  with moderate cues (50-74% accuracy)  -CJ  --    Time Frame (Labial Strengthening Goal 1, SLP)  short term goal (STG)  -  short term goal (STG)  -CJ  --    Progress/Outcomes (Labial Strengthening Goal 1, SLP)  goal ongoing  -  --  --       Lingual Strengthening Goal 1 (SLP)    Activity (Lingual Strengthening Goal 1, SLP)  --  increase lingual tone/sensation/control/coordination/movement;increase tongue back strength  -CJ  --    Increase Lingual Tone/Sensation/Control/Coordination/Movement  swallow trials;lingual resistance exercises  -  swallow trials;lingual resistance exercises  -CJ  --    Increase Tongue Back Strength  swallow trials;lingual resistance exercises  -  swallow trials;lingual resistance exercises  -CJ  --    [place]/Accuracy (Lingual Strengthening Goal 1, SLP)  with moderate cues (50-74% accuracy)  -AC  with moderate cues (50-74% accuracy)  -CJ  --    Time Frame (Lingual Strengthening Goal 1, SLP)  short term goal (STG)  -  short term goal (STG)  -CJ  --    Progress/Outcomes (Lingual Strengthening Goal 1, SLP)  goal ongoing  -  --  --       Pharyngeal Strengthening Exercise Goal 1 (SLP)    Activity (Pharyngeal Strengthening Goal 1, SLP)  --  increase timing;increase superior movement of the hyolaryngeal complex;increase anterior movement of the hyolaryngeal complex;increase closure at entrance to airway/closure of airway at glottis;increase squeeze/positive pressure generation;increase tongue base retraction  -CJ  --    Increase Timing  prepping - 3 second prep or suck swallow or 3-step swallow  -  prepping - 3 second prep or suck swallow or 3-step swallow  -CJ  --    Increase Superior Movement of the Hyolaryngeal Complex  falsetto  -AC  falsetto  -CJ  --    Increase Anterior Movement of the Hyolaryngeal Complex   chin tuck against resistance (CTAR)  -AC  chin tuck against resistance (CTAR)  -CJ  --    Increase Closure at Entrance to Airway/Closure of Airway at Glottis  supraglottic swallow  -AC  supraglottic swallow  -CJ  --    Increase Squeeze/Positive Pressure Generation  hard effortful swallow  -AC  hard effortful swallow  -CJ  --    Increase Tongue Base Retraction  jeana  -AC  jeana  -CJ  --    [place]/Accuracy (Pharyngeal Strengthening Goal 1, SLP)  with moderate cues (50-74% accuracy)  -AC  with moderate cues (50-74% accuracy)  -CJ  --    Time Frame (Pharyngeal Strengthening Goal 1, SLP)  short term goal (STG)  -AC  short term goal (STG)  -CJ  --    Barriers (Pharyngeal Strengthening Goal 1, SLP)  3 sec prep x5, effortful swallow x5 w/ mod cues each.  -AC  --  --    Progress/Outcomes (Pharyngeal Strengthening Goal 1, SLP)  good progress toward goal  -  --  --       Swallow Compensatory Strategies Goal 1 (SLP)    Activity (Swallow Compensatory Strategies/Techniques Goal 1, SLP)  compensatory strategies;during meal intake;during p.o. trials;small bites;small cup sips;food/liquid placed on stronger left side;alternate food/liquid intake;extra swallow per bolus  -AC  compensatory strategies;during meal intake;during p.o. trials;small bites;small cup sips;food/liquid placed on stronger left side;alternate food/liquid intake;extra swallow per bolus  -CJ  --    [place]/Accuracy (Swallow Compensatory Strategies/Techniques Goal 1, SLP)  with minimal cues (75-90% accuracy)  -AC  with minimal cues (75-90% accuracy)  -CJ  --    Time Frame (Swallow Compensatory Strategies/Techniques Goal 1, SLP)  short term goal (STG)  -AC  short term goal (STG)  -CJ  --    Barriers (Swallow Compensatory Strategies/Techniques Goal 1, SLP)  Pt needed frequent cues to use compensatory strategies, particularly strategies for clearing residue (mult swallows, liquid wash).   -AC  --  --    Progress/Outcomes (Swallow Compensatory  Strategies/Techniques Goal 1, SLP)  continuing progress toward goal  -AC  --  --       Comprehend Questions Goal 1 (SLP)    Improve Ability to Comprehend Questions Goal 1 (SLP)  simple yes/no questions;complex yes/no questions;100%;independently (over 90% accuracy)  -AC  --  simple yes/no questions;100%;independently (over 90% accuracy)  -SM    Time Frame (Comprehend Questions Goal 1, SLP)  short term goal (STG)  -AC  --  short term goal (STG)  -SM    Progress (Ability to Comprehend Questions Goal 1, SLP)  50%;independently (over 90% accuracy)  -AC  --  --    Progress/Outcomes (Comprehend Questions Goal 1, SLP)  continuing progress toward goal;goal revised this date  -AC  --  --       Follow Directions Goal 2 (SLP)    Improve Ability to Follow Directions Goal 1 (SLP)  multistep commands;90%;with minimal cues (75-90%)  -AC  --  1 step direction with objects;1 step direction without objects;90%;with minimal cues (75-90%)  -SM    Time Frame (Follow Directions Goal 1, SLP)  short term goal (STG)  -AC  --  short term goal (STG)  -SM    Progress (Ability to Follow Directions Goal 1, SLP)  20%;with minimal cues (75-90%)  -AC  --  --    Progress/Outcomes (Follow Directions Goal 1, SLP)  goal revised this date  -AC  --  --    Comment (Follow Directions Goal 1, SLP)  Pt met goals for 1-step directions with and w/o objects w/ 100% acc indptly - goal adjusted for higher-level.  -AC  --  --       Word Retrieval Skills Goal 1 (SLP)    Improve Word Retrieval Skills By Goal 1 (SLP)  confrontational naming task;completing open ended unstructured sentence;answer WH question with one word;80%;with minimal cues (75-90%)  -AC  --  confrontational naming task;completing open ended unstructured sentence;answer WH question with one word;80%;with minimal cues (75-90%)  -SM    Time Frame (Word Retrieval Goal 1, SLP)  short term goal (STG)  -AC  --  short term goal (STG)  -SM    Progress (Word Retrieval Skills Goal 1, SLP)  60%;with minimal  cues (75-90%)  -AC  --  --    Progress/Outcomes (Word Retrieval Goal 1, SLP)  continuing progress toward goal  -AC  --  --    Comment (Word Retrieval Goal 1, SLP)  Confrontational naming of common objects, wh questions. Responded well to semantic cues.  -AC  --  --       Articulation Goal 1 (SLP)    Improve Articulation Goal 1 (SLP)  by over-articulating at word level;90%;with minimal cues (75-90%)  -AC  --  by over-articulating at word level;90%;with minimal cues (75-90%)  -SM    Time Frame (Articulation Goal 1, SLP)  short term goal (STG)  -AC  --  short term goal (STG)  -SM    Progress/Outcomes (Articulation Goal 1, SLP)  goal ongoing  -AC  --  --       Orientation Goal 1 (SLP)    Improve Orientation Through Goal 1 (SLP)  demonstrating orientation to year;demonstrating orientation to place;demonstrating orientation to disease/impairment;100%;with minimal cues (75-90%)  -AC  --  demonstrating orientation to year;demonstrating orientation to place;demonstrating orientation to disease/impairment;100%;with minimal cues (75-90%)  -SM    Time Frame (Orientation Goal 1, SLP)  short term goal (STG)  -AC  --  short term goal (STG)  -SM    Progress (Orientation Goal 1, SLP)  30%;with minimal cues (75-90%)  -AC  --  --    Progress/Outcomes (Orientation Goal 1, SLP)  continuing progress toward goal  -AC  --  --    Comment (Orientation Goal 1, SLP)  Pt oriented to place, not time or situation.  -AC  --  --       Functional Problem Solving Skills Goal 1 (SLP)    Improve Problem Solving Through Goal 1 (SLP)  determine solutions to simple ADL/safety problems;90%;with minimal cues (75-90%)  -AC  --  determine solutions to simple ADL/safety problems;90%;with minimal cues (75-90%)  -SM    Time Frame (Problem Solving Goal 1, SLP)  short term goal (STG)  -AC  --  short term goal (STG)  -SM    Progress/Outcomes (Problem Solving Goal 1, SLP)  goal ongoing  -AC  --  --       Additional Goal 1 (SLP)    Additional Goal 1, SLP  LTG:  Improve cognitive-communication skills in order to participate in care while in hospital setting with 90% accuracy and cues  -AC  --  LTG: Improve cognitive-communication skills in order to participate in care while in hospital setting with 90% accuracy and cues  -SM    Time Frame (Additional Goal 1, SLP)  by discharge  -AC  --  by discharge  -SM    Progress/Outcomes (Additional Goal 1, SLP)  continuing progress toward goal  -AC  --  --      User Key  (r) = Recorded By, (t) = Taken By, (c) = Cosigned By    Initials Name Provider Type    Adrienne France, MS CCC-SLP Speech and Language Pathologist    Neela Mai MS CCC-SLP Speech and Language Pathologist    Jacquelin Burger, MS CCC-SLP Speech and Language Pathologist                  Time Calculation:     Time Calculation- SLP     Row Name 02/02/21 1151             Time Calculation- SLP    SLP Start Time  1100  -AC      SLP Received On  02/02/21  -        User Key  (r) = Recorded By, (t) = Taken By, (c) = Cosigned By    Initials Name Provider Type    Neela Mai MS CCC-SLP Speech and Language Pathologist          Therapy Charges for Today     Code Description Service Date Service Provider Modifiers Qty    69998486247 HC ST TREATMENT SWALLOW 2 2/2/2021 Neela Floyd MS CCC-SLP GN 1    [id number] HC ST TREATMENT SPEECH 2 2/2/2021 Neela Floyd MS CCC-SLP GN 1        Patient was not wearing a face mask and did exhibit coughing during this therapy encounter.  Procedure performed was aerosolizing, involved close contact (within 6 feet for at least 15 minutes or longer), and did not involve contact with infectious secretions or specimens.  Therapist used appropriate personal protective equipment including gloves, standard procedure mask, eye protection, gown and N95 mask.  Appropriate PPE was worn during the entire therapy session.  Hand hygiene was completed before and after therapy session.                Neela Floyd MS  CCC-SLP  2/2/2021

## 2021-02-02 NOTE — THERAPY TREATMENT NOTE
Patient Name: Arlene Calderon  : 1965    MRN: [id number]                              Today's Date: 2021       Admit Date: 2021    Visit Dx:     ICD-10-CM ICD-9-CM   1. Nontraumatic cortical hemorrhage of cerebral hemisphere, unspecified laterality (CMS/HCC)  I61.1 431   2. Impaired functional mobility, balance, gait, and endurance  Z74.09 V49.89   3. Oropharyngeal dysphagia  R13.12 787.22   4. 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 No pertinent past medical history.  History reviewed. No pertinent surgical history.  General Information     Row Name 21 1621          Physical Therapy Time and Intention    Document Type  therapy note (daily note)  -KG     Mode of Treatment  physical therapy  -KG     Row Name 21 1621          General Information    Existing Precautions/Restrictions  fall;other (see comments) R sided weakness/inattention; R visual field cut  -KG     Row Name 21 1621          Cognition    Orientation Status (Cognition)  oriented x 3  -KG     Row Name 21 1621          Safety Issues, Functional Mobility    Safety Issues Affecting Function (Mobility)  awareness of need for assistance;insight into deficits/self-awareness;safety precaution awareness;safety precautions follow-through/compliance  -KG     Impairments Affecting Function (Mobility)  balance;cognition;coordination;endurance/activity tolerance;postural/trunk control;strength  -KG     Cognitive Impairments, Mobility Safety/Performance  attention;awareness, need for assistance;impulsivity;insight into deficits/self-awareness;safety precaution awareness;safety precaution follow-through  -KG       User Key  (r) = Recorded By, (t) = Taken By, (c) = Cosigned By    Initials Name Provider Type     KG Cher Alamo, PT Physical Therapist        Mobility     Row Name 02/02/21 1622          Bed Mobility    Bed Mobility  supine-sit;sit-supine  -KG     Supine-Sit Baird (Bed Mobility)  standby assist;verbal cues  -KG     Sit-Supine Baird (Bed Mobility)  standby assist;verbal cues  -KG     Assistive Device (Bed Mobility)  bed rails;head of bed elevated  -KG     Comment (Bed Mobility)  VC's for sequencing and safety awareness.  -KG     Row Name 02/02/21 1622          Transfers    Comment (Transfers)  VC's for sequencing and safe hand placement. Pt impulsive; attempting to stand prior to instruction. Toilet transfer to bathroom with Cody.  -KG     Row Name 02/02/21 1622          Sit-Stand Transfer    Sit-Stand Baird (Transfers)  contact guard;verbal cues  -KG     Row Name 02/02/21 1622          Gait/Stairs (Locomotion)    Baird Level (Gait)  minimum assist (75% patient effort);verbal cues  -KG     Distance in Feet (Gait)  75  -KG     Deviations/Abnormal Patterns (Gait)  bilateral deviations;chin decreased;stride length decreased  -KG     Bilateral Gait Deviations  forward flexed posture  -KG     Right Sided Gait Deviations  weight shift ability decreased  -KG     Comment (Gait/Stairs)  Pt demonstrated step through gait pattern with slow chin. Frequent cues for upright posture. Pt with inattention to the R. Unsteady at times with poor safety awareness.  -KG       User Key  (r) = Recorded By, (t) = Taken By, (c) = Cosigned By    Initials Name Provider Type    Cher Kate PT Physical Therapist        Obj/Interventions     Row Name 02/02/21 1624          Balance    Balance Assessment  sitting static balance;standing static balance;standing dynamic balance  -KG     Static Sitting Balance  WFL;sitting, edge of bed  -KG     Static Standing Balance  WFL;supported;standing  -KG     Dynamic Standing Balance  mild impairment;supported;standing  -KG       User Key  (r) =  Recorded By, (t) = Taken By, (c) = Cosigned By    Initials Name Provider Type    KG Cher Alaom, PT Physical Therapist        Goals/Plan    No documentation.       Clinical Impression     [facility] Name 02/02/21 1624          Pain    Additional Documentation  Pain Scale: Numbers Pre/Post-Treatment (Group)  -KG     Row Name 02/02/21 1624          Pain Scale: Numbers Pre/Post-Treatment    Pretreatment Pain Rating  0/10 - no pain  -KG     Posttreatment Pain Rating  0/10 - no pain  -KG     Row Name 02/02/21 1624          Plan of Care Review    Plan of Care Reviewed With  patient  -KG     Progress  improving  -KG     Outcome Summary  Pt increased ambulation distance to 75ft with Cody. VC's for upright posture and increased stride length. Pt with increased difficulty attending to the R. Demonstrated periods of unsteady gait and poor safety awareness. Continue to progress as appropriate.  -KG     [facility] Name 02/02/21 1624          Vital Signs    Pre Systolic BP Rehab  109  -KG     Pre Treatment Diastolic BP  68  -KG     Post Systolic BP Rehab  137  -KG     Post Treatment Diastolic BP  70  -KG     Pretreatment Heart Rate (beats/min)  42  -KG     Posttreatment Heart Rate (beats/min)  56  -KG     Pre SpO2 (%)  96  -KG     O2 Delivery Pre Treatment  room air  -KG     Post SpO2 (%)  96  -KG     O2 Delivery Post Treatment  room air  -KG     Pre Patient Position  Supine  -KG     Intra Patient Position  Standing  -KG     Post Patient Position  Supine  -KG     Row Name 02/02/21 1624          Positioning and Restraints    Pre-Treatment Position  in bed  -KG     Post Treatment Position  bed  -KG     In Bed  notified nsg;supine;call light within reach;encouraged to call for assist;exit alarm on;side rails up x3  -KG       User Key  (r) = Recorded By, (t) = Taken By, (c) = Cosigned By    Initials Name Provider Type    KG Cher Alamo, PT Physical Therapist        Outcome Measures     Row Name 02/02/21 1626          How much  help from another person do you currently need...    Turning from your back to your side while in flat bed without using bedrails?  3  -KG     Moving from lying on back to sitting on the side of a flat bed without bedrails?  3  -KG     Moving to and from a bed to a chair (including a wheelchair)?  3  -KG     Standing up from a chair using your arms (e.g., wheelchair, bedside chair)?  3  -KG     Climbing 3-5 steps with a railing?  2  -KG     To walk in hospital room?  3  -KG     AM-PAC 6 Clicks Score (PT)  17  -KG     Row Name 02/02/21 1626          Functional Assessment    Outcome Measure Options  AM-PAC 6 Clicks Basic Mobility (PT)  -KG       User Key  (r) = Recorded By, (t) = Taken By, (c) = Cosigned By    Initials Name Provider Type    Cher Kate, PT Physical Therapist        Physical Therapy Education                 Title: PT OT SLP Therapies (In Progress)     Topic: Physical Therapy (In Progress)     Point: Mobility training (In Progress)     Learning Progress Summary           Patient Acceptance, E, NR by KG at 2/2/2021 1503    Acceptance, E,D, NR by LS at 2/1/2021 1039    Acceptance, E,D, VU,DU,NR by  at 1/31/2021 1059    Acceptance, E, NR by 1 at 1/30/2021 0815                   Point: Home exercise program (In Progress)     Learning Progress Summary           Patient Acceptance, E, NR by KG at 2/2/2021 1503    Acceptance, E,D, NR by LS at 2/1/2021 1039    Acceptance, E,D, VU,DU,NR by  at 1/31/2021 1059                   Point: Body mechanics (In Progress)     Learning Progress Summary           Patient Acceptance, E, NR by KG at 2/2/2021 1503    Acceptance, E,D, NR by LS at 2/1/2021 1039    Acceptance, E,D, VU,DU,NR by  at 1/31/2021 1059                   Point: Precautions (In Progress)     Learning Progress Summary           Patient Acceptance, E, NR by KG at 2/2/2021 1503    Acceptance, E,D, NR by LS at 2/1/2021 1039    Acceptance, E,D, VU,DU,NR by  at 1/31/2021 1059                                User Key     Initials Effective Dates Name Provider Type Discipline    SJ 06/19/15 -  Janneth Hill, PT Physical Therapist PT    LS1 07/17/19 -  Dulce Friend, PT Physical Therapist PT    LS 06/19/15 -  Gris Farah, PT Physical Therapist PT    KG 05/22/20 -  Cher Alamo, PT Physical Therapist PT              PT Recommendation and Plan     Plan of Care Reviewed With: patient  Progress: improving  Outcome Summary: Pt increased ambulation distance to 75ft with Cody. VC's for upright posture and increased stride length. Pt with increased difficulty attending to the R. Demonstrated periods of unsteady gait and poor safety awareness. Continue to progress as appropriate.     Time Calculation:   PT Charges     Row Name 02/02/21 1503             Time Calculation    Start Time  1503  -KG      PT Received On  02/02/21  -KG      PT Goal Re-Cert Due Date  02/09/21  -KG         Time Calculation- PT    Total Timed Code Minutes- PT  23 minute(s)  -KG         Timed Charges    11230 - PT Therapeutic Activity Minutes  23  -KG        User Key  (r) = Recorded By, (t) = Taken By, (c) = Cosigned By    Initials Name Provider Type    KG Cher Alamo, PT Physical Therapist        Therapy Charges for Today     Code Description Service Date Service Provider Modifiers Qty    54222732384 HC PT THERAPEUTIC ACT EA 15 MIN 2/2/2021 Cher Alamo, PT GP 2          PT G-Codes  Outcome Measure Options: AM-PAC 6 Clicks Basic Mobility (PT)  AM-PAC 6 Clicks Score (PT): 17  AM-PAC 6 Clicks Score (OT): 15  Modified Thiells Scale: 3 - Moderate disability.  Requiring some help, but able to walk without assistance.    Brianne Alamo, PT  2/2/2021

## 2021-02-02 NOTE — PLAN OF CARE
Goal Outcome Evaluation:  Plan of Care Reviewed With: patient  Progress: improving   SLP treatment completed. Will continue to address aphasia, dysarthria, cognitive-linguistic deficits, and oropharyngeal dysphagia in tx. Please see note for further details and recommendations.

## 2021-02-02 NOTE — PLAN OF CARE
Goal Outcome Evaluation:  Plan of Care Reviewed With: patient  Progress: improving  Outcome Summary: Pt increased ambulation distance to 75ft with Cody. VC's for upright posture and increased stride length. Pt with increased difficulty attending to the R. Demonstrated periods of unsteady gait and poor safety awareness. Continue to progress as appropriate.

## 2021-02-02 NOTE — PLAN OF CARE
Goal Outcome Evaluation:  Plan of Care Reviewed With: patient  Progress: improving  Outcome Summary: Pt demo improved independence by performing BSC t/f w/ CGA and toileting ADLs w/ Supervision. Pt educated on AE use and compensatory strategies to improve self-feeding, conts to be limited d/t R visual field deficits. Recommend cont skilled IPOT POC. Recommend pt DC to IP rehab.

## 2021-02-02 NOTE — PROGRESS NOTES
Intensivist Note     2/2/2021  Hospital Day: 4  * No surgery found *  ICU Stays Timeline            Hospital Admission: 01/29/21 2031 - Current  ICU stays: 1      In Date/Time Event Department ICU Stay Duration     01/29/21 2031 Admission [facility] 2B ICU 3 days 19 hours 33 minutes             Ms. Arlene Calderon, 55 y.o. female is followed for:    Acute left frontal ICH 1/29/2021. Smaller right parieto-occipital ICH also present (CMS/HCC)    PFO with right-to-left atrial level shunt on Valsalva    Hypertension    COVID-19 virus detected at admission       SUBJECTIVE     57-year-old white female smoker found at home with right-sided weakness and left lateral gaze and confusion. EMS was called and transferred to [facility] 1/29/2021 where she was found to be hypertensive with a low-grade temp.  CT scan revealed large left frontal intraparenchymal hemorrhage with surrounding vasogenic edema and localized mass-effect. Initial NIH was 22 with a GCS of 4-3-6.  ICH volume of 18 and ICH score of 1. Was transferred to [facility] the evening of 1/29/2021 and neurosurgery saw no sign of aneurysmal cause of bleed. Was felt most likely to have a hemorrhagic conversion of stroke or hypertensive hemorrhage. Surgical intervention was not recommended and focus has been on blood pressure control.  In addition patient was started on Keppra 1/31/2021.  Subsequent to her admission patient was documented to have a positive COVID-19 PCR. She was however asymptomatic other than a low-grade temp of 100.1 without any other symptoms such as nasal congestion cough, loss of taste or smell etc.    Interval history: Patient appears improved today.  Still has some mild aphasia with difficulty in word finding mild dysarthria but is easier to understand.  She follows simple commands, but still having problems with things such as using a TV channel changer. Continued to require Cardene yesterday was able to wean off by this morning since the addition of metoprolol and  "amlodipine.  Unfortunately patient's heart rate dropped to 40 bpm today.  Metoprolol was discontinued and she remains on amlodipine.  Echocardiogram was ordered yesterday because of the bilateral nature of her intracranial hemorrhages as I wanted to make sure there was no valvular disease or source of cardiac emboli they could have induced an ischemic event which subsequently had hemorrhagic conversion.  I discussed the situation with Dr. Lewis who reviewed the MRI and did not think either bleed looked like an ischemic event with hemorrhagic transformation.  He also noted that it did look like there was underlying pathology and although atypical felt these areas were a spontaneous hemorrhagic bleed in a patient with hypertension.    Regarding her COVID-19 viral infection, she remains asymptomatic.  She is afebrile without dyspnea, cough, congestion, pleuritic pain, myalgias, or any difficulty with sense of smell or taste.  No nausea, vomiting, melena, hematochezia, or hematemesis.  No difficulty voiding      ROS: Per subjective, all other systems reviewed and were negative.    The patient's relevant PMH, PSH, FH, and SH were reviewed and updated in Epic as appropriate. Allergies and Medications reviewed.    OBJECTIVE     /65 (BP Location: Left arm, Patient Position: Lying)   Pulse (!) 48   Temp 98.6 °F (37 °C) (Oral)   Resp 16   Ht 157.5 cm (62.01\")   Wt 60.3 kg (133 lb)   SpO2 96%   BMI 24.32 kg/m²           Flowsheet Rows      First Filed Value   Admission Height  157.5 cm (62.01\") Documented at 01/30/2021 0600   Admission Weight  58.8 kg (129 lb 10.1 oz) Documented at 01/30/2021 0600        Intake & Output (last day)       02/01 0701 - 02/02 0700 02/02 0701 - 02/03 0700    I.V. (mL/kg) 2295 (37.9) 482.4 (8)    IV Piggyback 100 100    Total Intake(mL/kg) 2395 (39.5) 582.4 (9.7)    Urine (mL/kg/hr) 3550 (2.4) 475 (0.9)    Total Output 3550 475    Net -1155 +107.4                Exam:  General Exam: "  Well-developed middle-aged white female supine in bed in NAD  HEENT: Pupils equal and reactive. Nose and throat clear.  Neck:                          Supple, no JVD, thyromegaly, or adenopathy  Lungs: Clear anteriorly, laterally, posteriorly without wheezes rales or rhonchi  Cardiovascular: RRR without murmurs or gallops.  HR 56 bpm  Abdomen: Soft nontender without organomegaly or masses.   and rectal: Deferred.  Extremities: No cyanosis clubbing edema.  Neurologic:                 Symmetric strength. No focal deficits.  Speech is mildly dysarthric and she has difficulty with word finding.  Will follow simple commands but has difficulty with more complex problem such as changing the TV channel    Chest X-Ray: CXR from 2/1/2021 was normal    Echocardiogram 2/2/2021: Reveals small patent foramen ovale with positive saline test with Valsalva maneuver for right to left atrial level shunt..  No valvular heart disease, LVEF is normal, and no evidence of diastolic dysfunction or pulmonary hypertension.    INFUSIONS  niCARdipine, 5-15 mg/hr, Last Rate: Stopped (02/02/21 0815)  sodium chloride, 75 mL/hr, Last Rate: 75 mL/hr (02/02/21 0836)        Results from last 7 days   Lab Units 02/02/21  0603 02/01/21  0502 01/31/21  0609   WBC 10*3/mm3 9.94 16.26* 13.86*   HEMOGLOBIN g/dL 16.0* 14.4 15.9   HEMATOCRIT % 48.2* 44.0 49.0*   PLATELETS 10*3/mm3 293 290 329     Results from last 7 days   Lab Units 02/02/21  0603 02/01/21  0502   SODIUM mmol/L 139 138   POTASSIUM mmol/L 3.6 4.2   CHLORIDE mmol/L 104 106   CO2 mmol/L 26.0 23.0   BUN mg/dL 18 21*   CREATININE mg/dL 0.58 0.63   GLUCOSE mg/dL 80 93   CALCIUM mg/dL 8.6 8.7     Results from last 7 days   Lab Units 02/02/21  0603 02/01/21  0502 01/31/21  0609   MAGNESIUM mg/dL 2.2 2.3 2.4   PHOSPHORUS mg/dL 3.0 3.3 3.2     Results from last 7 days   Lab Units 01/29/21  1811   ALK PHOS U/L 101   BILIRUBIN mg/dL 0.4   ALT (SGPT) U/L 17   AST (SGOT) U/L 19       No results found  for: SEDRATE  No results found for: BNP  Lab Results   Component Value Date    CKTOTAL 56 01/29/2021    TROPONINT <0.010 01/29/2021     No results found for: TSH  No results found for: LACTATE  No results found for: CORTISOL      I reviewed the patient's results, images and medication.    Assessment/Plan   ASSESSMENT        Acute left frontal ICH 1/29/2021. Smaller right parieto-occipital ICH also present (CMS/HCC)    PFO with right-to-left atrial level shunt on Valsalva    Hypertension    COVID-19 virus detected at admission      DISCUSSION: Blood pressure under control and it appears that there has been no progression of her bilateral intracranial hemorrhages left greater than right.  We will leave on amlodipine alone but has oral hydralazine prn which can be used if her pressure increases again.  She still has some word finding difficulty and dysarthria with some mild cognitive problems and would do better with rehab.  It still bothers me that she had bilateral intracranial hemorrhages.  While hypertensive, it was not that severe (although she required Cardene to keep SBP less than 140 until oral antihypertensives were started).  I do not think her Covid 19 infection had anything to do with this, and there was never any suggestion of vasculitis. Because of the bilateral nature of her hemorrhages I did obtain an echocardiogram which was unremarkable except for a small PFO which on Valsalva had a right to left shunt. There was however no pulmonary hypertension or valvular heart disease noted.  Neurosurgery feels that she can transfer to the floor and feels that rehab would be indicated with follow-up in their office with CT.  Getting into a rehab facility may be a difficult proposition because of her positive COVID-19 PCR on admission despite the fact that she is asymptomatic.    PLAN     1.  Transfer to telemetry and will ask the hospitalists to assume attending role  2.  Continue amlodipine  3.  If oral  hydralazine is required prn will need to adjust antihypertensives  2.   to work on rehab facilities that would accept her with her positive COVID-19 test   3.  Lower extremity venous duplex has been ordered.  Neurology reviewed her MRI and does not think the areas of hemorrhage showed any ischemia (such as an embolic event) that led to a hemorrhagic conversion.  Would like however to rule out occult DVT and cryptogenic embolic stroke as she does have a PFO.     Plan of care and goals reviewed with multidisciplinary team at daily rounds.    I discussed the patient's findings and my recommendations with patient and nursing staff    Patient is critically ill due to bilateral intracerebral hemorrhages presumably related to hypertension and has a high risk of life-threatening decline in condition.  She still requires continuous monitoring and frequent reassessment of condition for adjustment of management in order to lessen risk. .    Time spent Critical care 30 min (It does not include procedure time).    Electronically signed by Chaka Gracia MD, 02/02/21, 4:04 PM EST.   Pulmonary / Critical care medicine

## 2021-02-02 NOTE — PROGRESS NOTES
Continued Stay Note  [facility]     Patient Name: Arlene Calderon  MRN: [id number]  Today's Date: 2/2/2021    Admit Date: 1/29/2021    Discharge Plan     Row Name 02/02/21 0812       Plan    Plan  [facility]    Patient/Family in Agreement with Plan  yes    Plan Comments  [employer] screened and pt now has [facility] Medicaid. Consult noted for rehab. Called pt's friend, Tawana and discussed since pt has been some confused. She is agreeable to a referral to [facility]. Called referral to Trish at [facility] and they will follow. Tawana will update pt's daughter in [place] of plan. CM will follow. Jannet ext [phone]        Discharge Codes    No documentation.             Jannet Small RN

## 2021-02-03 ENCOUNTER — APPOINTMENT (OUTPATIENT)
Dept: CARDIOLOGY | Facility: HOSPITAL | Age: 56
End: 2021-02-03

## 2021-02-03 LAB
ANION GAP SERPL CALCULATED.3IONS-SCNC: 8 MMOL/L (ref 5–15)
BASOPHILS # BLD AUTO: 0.02 10*3/MM3 (ref 0–0.2)
BASOPHILS NFR BLD AUTO: 0.2 % (ref 0–1.5)
BH CV ECHO MEAS - BSA(HAYCOCK): 1.6 M^2
BH CV ECHO MEAS - BSA: 1.6 M^2
BH CV ECHO MEAS - BZI_BMI: 24.7 KILOGRAMS/M^2
BH CV ECHO MEAS - BZI_METRIC_HEIGHT: 157.5 CM
BH CV ECHO MEAS - BZI_METRIC_WEIGHT: 61.2 KG
BH CV LOWER VASCULAR LEFT COMMON FEMORAL AUGMENT: NORMAL
BH CV LOWER VASCULAR LEFT COMMON FEMORAL COMPRESS: NORMAL
BH CV LOWER VASCULAR LEFT COMMON FEMORAL PHASIC: NORMAL
BH CV LOWER VASCULAR LEFT COMMON FEMORAL SPONT: NORMAL
BH CV LOWER VASCULAR LEFT DISTAL FEMORAL AUGMENT: NORMAL
BH CV LOWER VASCULAR LEFT DISTAL FEMORAL COMPRESS: NORMAL
BH CV LOWER VASCULAR LEFT GASTRONEMIUS COMPRESS: NORMAL
BH CV LOWER VASCULAR LEFT GREATER SAPH AK COMPRESS: NORMAL
BH CV LOWER VASCULAR LEFT GREATER SAPH BK COMPRESS: NORMAL
BH CV LOWER VASCULAR LEFT LESSER SAPH COMPRESS: NORMAL
BH CV LOWER VASCULAR LEFT MID FEMORAL AUGMENT: NORMAL
BH CV LOWER VASCULAR LEFT MID FEMORAL COMPRESS: NORMAL
BH CV LOWER VASCULAR LEFT MID FEMORAL PHASIC: NORMAL
BH CV LOWER VASCULAR LEFT MID FEMORAL SPONT: NORMAL
BH CV LOWER VASCULAR LEFT PERONEAL AUGMENT: NORMAL
BH CV LOWER VASCULAR LEFT PERONEAL COMPRESS: NORMAL
BH CV LOWER VASCULAR LEFT POPLITEAL AUGMENT: NORMAL
BH CV LOWER VASCULAR LEFT POPLITEAL COMPRESS: NORMAL
BH CV LOWER VASCULAR LEFT POPLITEAL PHASIC: NORMAL
BH CV LOWER VASCULAR LEFT POPLITEAL SPONT: NORMAL
BH CV LOWER VASCULAR LEFT POSTERIOR TIBIAL AUGMENT: NORMAL
BH CV LOWER VASCULAR LEFT POSTERIOR TIBIAL COMPRESS: NORMAL
BH CV LOWER VASCULAR LEFT PROFUNDA FEMORAL AUGMENT: NORMAL
BH CV LOWER VASCULAR LEFT PROFUNDA FEMORAL COMPRESS: NORMAL
BH CV LOWER VASCULAR LEFT PROXIMAL FEMORAL AUGMENT: NORMAL
BH CV LOWER VASCULAR LEFT PROXIMAL FEMORAL COMPRESS: NORMAL
BH CV LOWER VASCULAR LEFT SAPHENOFEMORAL JUNCTION AUGMENT: NORMAL
BH CV LOWER VASCULAR LEFT SAPHENOFEMORAL JUNCTION COMPRESS: NORMAL
BH CV LOWER VASCULAR RIGHT COMMON FEMORAL AUGMENT: NORMAL
BH CV LOWER VASCULAR RIGHT COMMON FEMORAL COMPRESS: NORMAL
BH CV LOWER VASCULAR RIGHT COMMON FEMORAL PHASIC: NORMAL
BH CV LOWER VASCULAR RIGHT COMMON FEMORAL SPONT: NORMAL
BH CV LOWER VASCULAR RIGHT DISTAL FEMORAL AUGMENT: NORMAL
BH CV LOWER VASCULAR RIGHT DISTAL FEMORAL COMPRESS: NORMAL
BH CV LOWER VASCULAR RIGHT GASTRONEMIUS COMPRESS: NORMAL
BH CV LOWER VASCULAR RIGHT GREATER SAPH AK COMPRESS: NORMAL
BH CV LOWER VASCULAR RIGHT GREATER SAPH BK COMPRESS: NORMAL
BH CV LOWER VASCULAR RIGHT LESSER SAPH COMPRESS: NORMAL
BH CV LOWER VASCULAR RIGHT MID FEMORAL AUGMENT: NORMAL
BH CV LOWER VASCULAR RIGHT MID FEMORAL COMPRESS: NORMAL
BH CV LOWER VASCULAR RIGHT MID FEMORAL PHASIC: NORMAL
BH CV LOWER VASCULAR RIGHT MID FEMORAL SPONT: NORMAL
BH CV LOWER VASCULAR RIGHT PERONEAL AUGMENT: NORMAL
BH CV LOWER VASCULAR RIGHT PERONEAL COMPRESS: NORMAL
BH CV LOWER VASCULAR RIGHT POPLITEAL AUGMENT: NORMAL
BH CV LOWER VASCULAR RIGHT POPLITEAL COMPRESS: NORMAL
BH CV LOWER VASCULAR RIGHT POPLITEAL PHASIC: NORMAL
BH CV LOWER VASCULAR RIGHT POPLITEAL SPONT: NORMAL
BH CV LOWER VASCULAR RIGHT POSTERIOR TIBIAL AUGMENT: NORMAL
BH CV LOWER VASCULAR RIGHT POSTERIOR TIBIAL COMPRESS: NORMAL
BH CV LOWER VASCULAR RIGHT PROFUNDA FEMORAL AUGMENT: NORMAL
BH CV LOWER VASCULAR RIGHT PROFUNDA FEMORAL COMPRESS: NORMAL
BH CV LOWER VASCULAR RIGHT PROXIMAL FEMORAL AUGMENT: NORMAL
BH CV LOWER VASCULAR RIGHT PROXIMAL FEMORAL COMPRESS: NORMAL
BH CV LOWER VASCULAR RIGHT SAPHENOFEMORAL JUNCTION AUGMENT: NORMAL
BH CV LOWER VASCULAR RIGHT SAPHENOFEMORAL JUNCTION COMPRESS: NORMAL
BUN SERPL-MCNC: 19 MG/DL (ref 6–20)
BUN/CREAT SERPL: 24.4 (ref 7–25)
CALCIUM SPEC-SCNC: 9.1 MG/DL (ref 8.6–10.5)
CHLORIDE SERPL-SCNC: 105 MMOL/L (ref 98–107)
CO2 SERPL-SCNC: 29 MMOL/L (ref 22–29)
CREAT SERPL-MCNC: 0.78 MG/DL (ref 0.57–1)
DEPRECATED RDW RBC AUTO: 44.5 FL (ref 37–54)
EOSINOPHIL # BLD AUTO: 0.16 10*3/MM3 (ref 0–0.4)
EOSINOPHIL NFR BLD AUTO: 1.7 % (ref 0.3–6.2)
ERYTHROCYTE [DISTWIDTH] IN BLOOD BY AUTOMATED COUNT: 13.6 % (ref 12.3–15.4)
GFR SERPL CREATININE-BSD FRML MDRD: 76 ML/MIN/1.73
GLUCOSE SERPL-MCNC: 101 MG/DL (ref 65–99)
HCT VFR BLD AUTO: 49.6 % (ref 34–46.6)
HGB BLD-MCNC: 16.3 G/DL (ref 12–15.9)
IMM GRANULOCYTES # BLD AUTO: 0.07 10*3/MM3 (ref 0–0.05)
IMM GRANULOCYTES NFR BLD AUTO: 0.7 % (ref 0–0.5)
LYMPHOCYTES # BLD AUTO: 3.17 10*3/MM3 (ref 0.7–3.1)
LYMPHOCYTES NFR BLD AUTO: 33 % (ref 19.6–45.3)
MCH RBC QN AUTO: 29.5 PG (ref 26.6–33)
MCHC RBC AUTO-ENTMCNC: 32.9 G/DL (ref 31.5–35.7)
MCV RBC AUTO: 89.7 FL (ref 79–97)
MONOCYTES # BLD AUTO: 0.86 10*3/MM3 (ref 0.1–0.9)
MONOCYTES NFR BLD AUTO: 9 % (ref 5–12)
NEUTROPHILS NFR BLD AUTO: 5.32 10*3/MM3 (ref 1.7–7)
NEUTROPHILS NFR BLD AUTO: 55.4 % (ref 42.7–76)
NRBC BLD AUTO-RTO: 0 /100 WBC (ref 0–0.2)
PLATELET # BLD AUTO: 298 10*3/MM3 (ref 140–450)
PMV BLD AUTO: 10.5 FL (ref 6–12)
POTASSIUM SERPL-SCNC: 4.1 MMOL/L (ref 3.5–5.2)
RBC # BLD AUTO: 5.53 10*6/MM3 (ref 3.77–5.28)
SODIUM SERPL-SCNC: 142 MMOL/L (ref 136–145)
WBC # BLD AUTO: 9.6 10*3/MM3 (ref 3.4–10.8)

## 2021-02-03 PROCEDURE — 97530 THERAPEUTIC ACTIVITIES: CPT

## 2021-02-03 PROCEDURE — 97535 SELF CARE MNGMENT TRAINING: CPT

## 2021-02-03 PROCEDURE — 97116 GAIT TRAINING THERAPY: CPT

## 2021-02-03 PROCEDURE — 93970 EXTREMITY STUDY: CPT | Performed by: INTERNAL MEDICINE

## 2021-02-03 PROCEDURE — 99233 SBSQ HOSP IP/OBS HIGH 50: CPT | Performed by: INTERNAL MEDICINE

## 2021-02-03 PROCEDURE — 93970 EXTREMITY STUDY: CPT

## 2021-02-03 PROCEDURE — 25010000002 LEVETIRACETAM IN NACL 0.82% 500 MG/100ML SOLUTION: Performed by: INTERNAL MEDICINE

## 2021-02-03 PROCEDURE — 85025 COMPLETE CBC W/AUTO DIFF WBC: CPT | Performed by: INTERNAL MEDICINE

## 2021-02-03 PROCEDURE — 80048 BASIC METABOLIC PNL TOTAL CA: CPT | Performed by: INTERNAL MEDICINE

## 2021-02-03 RX ORDER — FAMOTIDINE 20 MG/1
20 TABLET, FILM COATED ORAL
Status: DISCONTINUED | OUTPATIENT
Start: 2021-02-03 | End: 2021-02-05 | Stop reason: HOSPADM

## 2021-02-03 RX ORDER — ACETAMINOPHEN 325 MG/1
650 TABLET ORAL EVERY 6 HOURS PRN
Status: DISCONTINUED | OUTPATIENT
Start: 2021-02-03 | End: 2021-02-05 | Stop reason: HOSPADM

## 2021-02-03 RX ORDER — LEVETIRACETAM 500 MG/1
500 TABLET ORAL 2 TIMES DAILY
Status: DISCONTINUED | OUTPATIENT
Start: 2021-02-03 | End: 2021-02-05 | Stop reason: HOSPADM

## 2021-02-03 RX ADMIN — FAMOTIDINE 20 MG: 10 INJECTION INTRAVENOUS at 09:25

## 2021-02-03 RX ADMIN — LEVETIRACETAM 500 MG: 5 INJECTION INTRAVASCULAR at 10:18

## 2021-02-03 RX ADMIN — ACETAMINOPHEN 650 MG: 325 TABLET ORAL at 13:00

## 2021-02-03 RX ADMIN — AMLODIPINE BESYLATE 10 MG: 10 TABLET ORAL at 09:25

## 2021-02-03 RX ADMIN — LEVETIRACETAM 500 MG: 500 TABLET, FILM COATED ORAL at 20:23

## 2021-02-03 RX ADMIN — MULTIPLE VITAMINS W/ MINERALS TAB 1 TABLET: TAB at 09:25

## 2021-02-03 RX ADMIN — FAMOTIDINE 20 MG: 20 TABLET, FILM COATED ORAL at 18:05

## 2021-02-03 NOTE — PLAN OF CARE
Goal Outcome Evaluation:        Outcome Summary: Patient without noted neuro changes. denies pain. vitals stable

## 2021-02-03 NOTE — PROGRESS NOTES
Continued Stay Note  [facility]     Patient Name: Arlene Calderon  MRN: [id number]  Today's Date: 2/3/2021    Admit Date: 1/29/2021    Discharge Plan     Row Name 02/03/21 1424       Plan    Plan  update    Patient/Family in Agreement with Plan  yes    Plan Comments  Per previous CM notes, referral called to Trish with [facility].  Spoke to [facility] with [facility] and advised patient is nearing discharge who will submit referral for precert.  Spoke with patient via telehpne regarding discharge plan and advised that [facility] is reviewing her her referral and may have a bed offer soon.  Patient agreeable to plan.  No new discharge needs verbalized.  CM following.  Patient plan is to discharge to [facility] when bed offered and insurance approved.    Final Discharge Disposition Code  62 - inpatient rehab facility        Discharge Codes    No documentation.             Racheal Castro RN

## 2021-02-03 NOTE — THERAPY TREATMENT NOTE
Patient Name: Arlene Calderon  : 1965    MRN: [id number]                              Today's Date: 2/3/2021       Admit Date: 2021    Visit Dx:     ICD-10-CM ICD-9-CM   1. Nontraumatic cortical hemorrhage of cerebral hemisphere, unspecified laterality (CMS/HCC)  I61.1 431   2. Impaired functional mobility, balance, gait, and endurance  Z74.09 V49.89   3. Oropharyngeal dysphagia  R13.12 787.22   4. Cognitive communication deficit  R41.841 799.52     Patient Active Problem List   Diagnosis   • Acute left frontal ICH 2021. Smaller right parieto-occipital ICH also present (CMS/HCC)   • COVID-19 virus detected at admission   • PFO with right-to-left atrial level shunt on Valsalva   • Hypertension     History reviewed. No pertinent past medical history.  History reviewed. No pertinent surgical history.  General Information     Row Name 21 0947          Physical Therapy Time and Intention    Document Type  therapy note (daily note)  -     Mode of Treatment  individual therapy;physical therapy  -     Row Name 21 0947          General Information    Existing Precautions/Restrictions  other (see comments) contact and airborne  -     Row Name 21 0947          Cognition    Orientation Status (Cognition)  oriented x 3  -     Row Name 21 0957          Safety Issues, Functional Mobility    Safety Issues Affecting Function (Mobility)  insight into deficits/self-awareness;judgment;awareness of need for assistance  -SJ     Impairments Affecting Function (Mobility)  balance;cognition;coordination;strength  -       User Key  (r) = Recorded By, (t) = Taken By, (c) = Cosigned By    Initials Name Provider Type    Janneth Obando PT Physical Therapist        Mobility     Row Name 21 1023          Bed Mobility    Sit-Supine [place] (Bed Mobility)  modified independence  -SJ     Assistive Device (Bed Mobility)  bed rails;head of bed elevated  -SJ     Comment (Bed Mobility)  pt  able to complete independently with good safety  -SJ     Row Name 02/03/21 1023          Transfers    Comment (Transfers)  Pt t/f sit <> stand from EOB and bathroom toilet with independence. Continues with impulsivity and decreased safety awareness. No LOB.  -SJ     Row Name 02/03/21 1023          Sit-Stand Transfer    Sit-Stand [place] (Transfers)  independent  -SJ     Row Name 02/03/21 1023          Gait/Stairs (Locomotion)    [place] Level (Gait)  contact guard;1 person assist  -SJ     Distance in Feet (Gait)  80  -SJ     Deviations/Abnormal Patterns (Gait)  bilateral deviations;gait speed decreased;base of support, narrow  -SJ     Comment (Gait/Stairs)  Pt ambulated laps in room with CGA with improving balance. Pt needs cues for direction and safety. VSS with activity on RA.  -SJ       User Key  (r) = Recorded By, (t) = Taken By, (c) = Cosigned By    Initials Name Provider Type    Janneth Obando PT Physical Therapist        Obj/Interventions     [facility] Name 02/03/21 134 02/03/21 1025       Balance    Balance Assessment  sitting static balance;standing static balance  -SJ  --    Static Sitting Balance  WFL;sitting, edge of bed  -SJ  --    Dynamic Sitting Balance  WFL;sitting, edge of bed  -SJ  --    Static Standing Balance  WFL;unsupported  -SJ  WFL;unsupported  -SJ    Dynamic Standing Balance  --  mild impairment;unsupported  -SJ    Balance Interventions  --  occupation based/functional task;weight shifting activity  -SJ      User Key  (r) = Recorded By, (t) = Taken By, (c) = Cosigned By    Initials Name Provider Type    Janneth Obando PT Physical Therapist        Goals/Plan    No documentation.       Clinical Impression     [facility] Name 02/03/21 1345 02/03/21 1026       Pain    Additional Documentation  Pain Scale: Numbers Pre/Post-Treatment (Group)  -SJ  Pain Scale: Numbers Pre/Post-Treatment (Group)  -SJ    [facility] Name 02/03/21 1345 02/03/21 1026       Pain Scale: Numbers Pre/Post-Treatment     Pretreatment Pain Rating  0/10 - no pain  -SJ  0/10 - no pain  -SJ    Posttreatment Pain Rating  0/10 - no pain  -SJ  0/10 - no pain  -SJ    Row Name 02/03/21 1345 02/03/21 1026       Plan of Care Review    Plan of Care Reviewed With  patient  -SJ  patient  -SJ    Progress  improving  -SJ  improving  -SJ    Outcome Summary  --  Pt ambulated laps in room with CGA x80ft with improving balance. Pt needs cues for direction and safety. Pt t/f sit <> stand from EOB and bathroom toilet with independence. Continues with impulsivity and decreased safety awareness. No LOB. VSS with activity on RA.  -    Row Name 02/03/21 1026          Therapy Assessment/Plan (PT)    Patient/Family Therapy Goals Statement (PT)  expressive aphasia  -     Row Name 02/03/21 1026          Vital Signs    Pre Systolic BP Rehab  134  -SJ     Pre Treatment Diastolic BP  83  -SJ     Pretreatment Heart Rate (beats/min)  54  -SJ     Post SpO2 (%)  92  -SJ     O2 Delivery Post Treatment  room air  -     Row Name 02/03/21 1026          Positioning and Restraints    Pre-Treatment Position  in bed  -SJ     Post Treatment Position  bed  -SJ     In Bed  supine;encouraged to call for assist;call light within reach;exit alarm on;notified ns  -       User Key  (r) = Recorded By, (t) = Taken By, (c) = Cosigned By    Initials Name Provider Type    Janneth Obando, PT Physical Therapist        Outcome Measures     Row Name 02/03/21 5158          How much help from another person do you currently need...    Turning from your back to your side while in flat bed without using bedrails?  4  -SJ     Moving from lying on back to sitting on the side of a flat bed without bedrails?  4  -SJ     Moving to and from a bed to a chair (including a wheelchair)?  4  -SJ     Standing up from a chair using your arms (e.g., wheelchair, bedside chair)?  4  -SJ     Climbing 3-5 steps with a railing?  3  -SJ     To walk in hospital room?  4  -SJ     AM-PAC 6 Clicks Score  (PT)  23  -     Row Name 02/03/21 1348          Modified [place] Scale    Modified [place] Scale  3 - Moderate disability.  Requiring some help, but able to walk without assistance.  -     Row Name 02/03/21 1348          Functional Assessment    Outcome Measure Options  AM-PAC 6 Clicks Basic Mobility (PT);Modified [place]  -SJ       User Key  (r) = Recorded By, (t) = Taken By, (c) = Cosigned By    Initials Name Provider Type     Janneth Hill, PT Physical Therapist        Physical Therapy Education                 Title: PT OT SLP Therapies (In Progress)     Topic: Physical Therapy (In Progress)     Point: Mobility training (In Progress)     Learning Progress Summary           Patient Acceptance, E, NR by KG at 2/2/2021 1503    Acceptance, E,D, NR by LS at 2/1/2021 1039    Acceptance, E,D, VU,DU,NR by  at 1/31/2021 1059    Acceptance, E, NR by 1 at 1/30/2021 0815                   Point: Home exercise program (In Progress)     Learning Progress Summary           Patient Acceptance, E, NR by KG at 2/2/2021 1503    Acceptance, E,D, NR by LS at 2/1/2021 1039    Acceptance, E,D, VU,DU,NR by  at 1/31/2021 1059                   Point: Body mechanics (In Progress)     Learning Progress Summary           Patient Acceptance, E, NR by KG at 2/2/2021 1503    Acceptance, E,D, NR by LS at 2/1/2021 1039    Acceptance, E,D, VU,DU,NR by  at 1/31/2021 1059                   Point: Precautions (In Progress)     Learning Progress Summary           Patient Acceptance, E, NR by KG at 2/2/2021 1503    Acceptance, E,D, NR by  at 2/1/2021 1039    Acceptance, E,D, VU,DU,NR by  at 1/31/2021 1059                               User Key     Initials Effective Dates Name Provider Type Discipline     06/19/15 -  Janneth Hill, PT Physical Therapist PT    LS1 07/17/19 -  Dulce Friend, PT Physical Therapist PT    LS 06/19/15 -  Gris Farah, PT Physical Therapist PT    KG 05/22/20 -  Cher Alamo, PT  Physical Therapist PT              PT Recommendation and Plan     Plan of Care Reviewed With: patient  Progress: improving  Outcome Summary: Pt ambulated laps in room with CGA x80ft with improving balance. Pt needs cues for direction and safety. Pt t/f sit <> stand from EOB and bathroom toilet with independence. Continues with impulsivity and decreased safety awareness. No LOB. VSS with activity on RA.     Time Calculation:   PT Charges     Row Name 02/03/21 1349             Time Calculation    Start Time  0947  -      PT Received On  02/03/21  -      PT Goal Re-Cert Due Date  02/09/21  -         Time Calculation- PT    Total Timed Code Minutes- PT  24 minute(s)  -SJ         Timed Charges    49171 - Gait Training Minutes   16  -SJ      64321 - PT Therapeutic Activity Minutes  8  -SJ        User Key  (r) = Recorded By, (t) = Taken By, (c) = Cosigned By    Initials Name Provider Type     Janneth Hill, PT Physical Therapist        Therapy Charges for Today     Code Description Service Date Service Provider Modifiers Qty    43035379008 HC GAIT TRAINING EA 15 MIN 2/3/2021 Janneth Hill, PT GP 1    [id number]  PT THERAPEUTIC ACT EA 15 MIN 2/3/2021 Janneth Hill, PT GP 1          PT G-Codes  Outcome Measure Options: AM-PAC 6 Clicks Basic Mobility (PT), Modified [place]  AM-PAC 6 Clicks Score (PT): 23  AM-PAC 6 Clicks Score (OT): 15  Modified [place] Scale: 3 - Moderate disability.  Requiring some help, but able to walk without assistance.    Janneth Hill PT  2/3/2021

## 2021-02-03 NOTE — PLAN OF CARE
Goal Outcome Evaluation:  Plan of Care Reviewed With: patient  Progress: improving  Outcome Summary: Pt ambulated laps in room with CGA x80ft with improving balance. Pt needs cues for direction and safety. Pt t/f sit <> stand from EOB and bathroom toilet with independence. Continues with impulsivity and decreased safety awareness. No LOB. VSS with activity on RA.

## 2021-02-03 NOTE — PLAN OF CARE
Goal Outcome Evaluation:  Plan of Care Reviewed With: patient  Progress: improving  Outcome Summary: Pt CGA for functional mobility though conts to require assist for all ADL performance. Pt is performed self feeding w/ AE and compensatory strategies. Pt conts to be limited d/t R sided visual field deficits. Recommend cont skilled IPOT POC. Recommend pt DC to IP rehab.

## 2021-02-03 NOTE — PROGRESS NOTES
[facility] Medicine Services  PROGRESS NOTE    Patient Name: Arlene Calderon  : 1965  MRN: [id number]    Date of Admission: 2021  Primary Care Physician: Maggi Talamantes APRN    Subjective   Subjective     CC:  ICH    HPI:  Patient doing ok- speech sometimes difficult to understand but patient is oriented and reports she feels ok. Trying to call her friend Jorden. She reports that she is doing well otherwise. Understands plan of care    ROS:  Gen- No fevers, chills  CV- No chest pain, palpitations  Resp- No cough, dyspnea  GI- No N/V/D, abd pain         Objective   Objective     Vital Signs:   Temp:  [96.2 °F (35.7 °C)-98.6 °F (37 °C)] 96.2 °F (35.7 °C)  Heart Rate:  [48-71] 54  Resp:  [16-18] 18  BP: (108-149)/(62-93) 134/83  Total (NIH Stroke Scale): 6     Physical Exam:  With patient's consent, physical exam was conducted via visual telemedicine encounter due to patient's current isolation requirements in the interest of PPE conservation.    Constitutional: No acute distress, awake, alert, nontoxic, normal body habitus  HENT: NCAT, MMM, no conjunctival injection  Respiratory: Good effort, nonlabored respirations   Cardiovascular:  tele with NSR  Musculoskeletal: No edema, normal muscle tone and mass for age  Psychiatric: Appropriate affect, good insight and judgement, cooperative  Neurologic: Oriented x 3, movements symmetric BUE and BLE, speech somewhat difficult to understand with aphasia but mostly clear  Skin: No visible rashes, no jaundice seen on exposed skin through window        Results Reviewed:  Results from last 7 days   Lab Units 21  0603 21  0502 21  0609 21  2105 21  1811   WBC 10*3/mm3 9.94 16.26* 13.86*  --  13.92*   HEMOGLOBIN g/dL 16.0* 14.4 15.9  --  15.3   HEMATOCRIT % 48.2* 44.0 49.0*  --  45.1   PLATELETS 10*3/mm3 293 290 329  --  359   INR   --   --   --  1.12 0.97   PROCALCITONIN ng/mL  --  0.03  --   --   --      Results  from last 7 days   Lab Units 02/02/21  0603 02/01/21  0502 01/31/21  0609  01/29/21  1811   SODIUM mmol/L 139 138 139  --  143   POTASSIUM mmol/L 3.6 4.2 3.7   < > 4.4   CHLORIDE mmol/L 104 106 106  --  105   CO2 mmol/L 26.0 23.0 24.0  --  26.6   BUN mg/dL 18 21* 23*  --  9   CREATININE mg/dL 0.58 0.63 0.57  --  0.67   GLUCOSE mg/dL 80 93 131*  --  126*   CALCIUM mg/dL 8.6 8.7 9.4  --  9.7   ALT (SGPT) U/L  --   --   --   --  17   AST (SGOT) U/L  --   --   --   --  19   TROPONIN T ng/mL  --   --   --   --  <0.010    < > = values in this interval not displayed.     Estimated Creatinine Clearance: 93.4 mL/min (by C-G formula based on SCr of 0.58 mg/dL).    Microbiology Results Abnormal     Procedure Component Value - Date/Time    COVID PRE-OP / PRE-PROCEDURE SCREENING ORDER (NO ISOLATION) - Swab, Nasopharynx [[id number]]  (Abnormal) Collected: 01/30/21 0242    Lab Status: Final result Specimen: Swab from Nasopharynx Updated: 01/30/21 0749    Narrative:      The following orders were created for panel order COVID PRE-OP / PRE-PROCEDURE SCREENING ORDER (NO ISOLATION) - Swab, Nasopharynx.  Procedure                               Abnormality         Status                     ---------                               -----------         ------                     COVID-19,CEPHEID,AARON IN-...[[id number]]  Abnormal            Final result                 Please view results for these tests on the individual orders.    COVID-19,CEPHEID,AARON IN-HOUSE(OR EMERGENT/ADD-ON),NP SWAB IN TRANSPORT MEDIA 3-4 HR TAT - Swab, Nasopharynx [[id number]]  (Abnormal) Collected: 01/30/21 0242    Lab Status: Final result Specimen: Swab from Nasopharynx Updated: 01/30/21 0749     COVID19 Detected    Narrative:      Fact sheet for providers: https://www.fda.gov/media/081343/download     Fact sheet for patients: https://www.fda.gov/media/657637/download          Imaging Results (Last 24 Hours)     Procedure Component Value Units Date/Time    FL Video  Swallow With Speech Single Contrast [[id number]] Collected: 02/01/21 1505     Updated: 02/02/21 1055    Narrative:      EXAMINATION: FL VIDEO SWALLOW W SPEECH SINGLE-CONTRAST-     INDICATION: dysphagia; Z74.09-Other reduced mobility; R13.10-Dysphagia,  unspecified; R41.841-Cognitive communication deficit     TECHNIQUE: 1 minute and 48 seconds of fluoroscopic time was used for  this exam. 1 associated image was saved. The patient was evaluated in  the seated lateral position while taking a variety of consistencies of  barium by mouth under the direction of speech pathology.     COMPARISON: NONE     FINDINGS: 1 minute and 48 seconds of fluoroscopy provided for a modified  barium swallow. Please see speech therapy report for full details and  recommendations.          Impression:      Fluoroscopy provided for a modified barium swallow. Please  see speech therapy report for full details and recommendations.         This report was finalized on 2/2/2021 10:51 AM by Dr. Philip Dey.             Results for orders placed during the hospital encounter of 01/29/21   Adult Transthoracic Echo Complete W/ Cont if Necessary Per Protocol    Narrative · Saline test results are positive with valsalva manuever for right to   left atrial level shunt.  · Estimated left ventricular EF = 60% Estimated left ventricular EF was in   agreement with the calculated left ventricular EF. Left ventricular   ejection fraction appears to be 56 - 60%. Left ventricular systolic   function is normal.  · Left ventricular diastolic function was normal.  · Normal right ventricular cavity size, wall thickness, systolic function   and septal motion noted.  · No evidence of pulmonary hypertension is present.  · There is no evidence of pericardial effusion.  · No significant structural or functional valvular abnormality   demonstrated.  · Small patent foramen ovale present.          I have reviewed the medications:  Scheduled Meds:amLODIPine, 10 mg, Oral,  Q24H  famotidine, 20 mg, Intravenous, Q12H  levETIRAcetam, 500 mg, Intravenous, Q12H  multivitamin with minerals, 1 tablet, Oral, Daily      Continuous Infusions:   PRN Meds:.•  acetaminophen  •  calcium gluconate IVPB **AND** calcium gluconate IVPB **AND** Calcium, Ionized  •  hydrALAZINE  •  influenza vaccine  •  magnesium sulfate **OR** magnesium sulfate **OR** magnesium sulfate  •  potassium chloride  •  potassium phosphate infusion greater than 15 mMoles **OR** potassium phosphate infusion greater than 15 mMoles **OR** potassium phosphate **OR** sodium phosphate IVPB **OR** sodium phosphate IVPB **OR** sodium phosphate IVPB    Assessment/Plan   Assessment & Plan     Active Hospital Problems    Diagnosis  POA   • **Acute left frontal ICH 1/29/2021. Smaller right parieto-occipital ICH also present (CMS/HCC) [I61.9]  Yes   • PFO with right-to-left atrial level shunt on Valsalva [Q21.1]  Not Applicable   • Hypertension [I10]  Yes   • COVID-19 virus detected at admission [U07.1]  Yes      Resolved Hospital Problems    Diagnosis Date Resolved POA   • Leukocytosis [D72.829] 02/02/2021 Yes        Brief Hospital Course to date:  female smoker found at home with right-sided weakness and left lateral gaze and confusion. EMS was called and transferred to [facility] 1/29/2021 where she was found to be hypertensive with a low-grade temp.  CT scan revealed large left frontal intraparenchymal hemorrhage with surrounding vasogenic edema and localized mass-effect. Initial NIH was 22 with a GCS of 4-3-6.  ICH volume of 18 and ICH score of 1. Was transferred to [facility] the evening of 1/29/2021 and neurosurgery saw no sign of aneurysmal cause of bleed. Was felt most likely to have a hemorrhagic conversion of stroke or hypertensive hemorrhage. Surgical intervention was not recommended and focus has been on blood pressure control.  In addition patient was started on Keppra 1/31/2021.  Subsequent to her admission patient was documented to have a  positive COVID-19 PCR. She was however asymptomatic other than a low-grade temp of 100.1. Patient has been monitored in the ICU and transferred to tele floor 2/3 with hospitalists assuming attending role.         Large left frontal ICH with mass effect and vasogenic edema  ---has been seen by neurology/NSGY- Dr Villa follows- no role for surgical intervention, will need f/u imaging with repeat MRI in 6-8weeks  ---no role for antiplatelets/anticoagulants   ---focus on blood pressure contol at this time- she has on/off required cardene gtt with ICU addition of metoprolol/amlodipine  ---ICU had d/w Dr Lewis of neurology who reveiwed imaging and did not feel either bleed appeared to look like ischemic event -He also noted that it did look like there was underlying pathology and although atypical felt these areas were a spontaneous hemorrhagic bleed in a patient with hypertension  ---ECHO done with small PFO but otherwise normal   ---PT/OT/SLP/CM following- planning for [facility]     HTN  ---now controlled on current medications, monitor        COVID 19  ---1/30/21 + testing. Will need to remain is isolation for 10 days if remains asymptomatic without treatment-- 2/9/21.   ---continues to be on room air     [facility]      Disposition: I expect the patient to be discharged to rehab pending acceptance, doing well.    Patient declines offer to call family today    CODE STATUS:   Code Status and Medical Interventions:   Ordered at: 01/29/21 2054     Level Of Support Discussed With:    Patient     Code Status:    CPR     Medical Interventions (Level of Support Prior to Arrest):    Full       Yuliya Branch MD  02/03/21

## 2021-02-03 NOTE — CONSULTS
Chart reviewed for diabetes , initiated per stroke protocol. Patient remains confused. We will continue to monitor for educational opportunities. Not a candidate for diabetes/stroke class as no dx of diabetes noted in chart. Thank you for this referral.

## 2021-02-03 NOTE — THERAPY TREATMENT NOTE
Patient Name: Arlene Calderon  : 1965    MRN: [id number]                              Today's Date: 2/3/2021       Admit Date: 2021    Visit Dx:     ICD-10-CM ICD-9-CM   1. Nontraumatic cortical hemorrhage of cerebral hemisphere, unspecified laterality (CMS/HCC)  I61.1 431   2. Impaired functional mobility, balance, gait, and endurance  Z74.09 V49.89   3. Oropharyngeal dysphagia  R13.12 787.22   4. Cognitive communication deficit  R41.841 799.52     Patient Active Problem List   Diagnosis   • Acute left frontal ICH 2021. Smaller right parieto-occipital ICH also present (CMS/HCC)   • COVID-19 virus detected at admission   • PFO with right-to-left atrial level shunt on Valsalva   • Hypertension     History reviewed. No pertinent past medical history.  History reviewed. No pertinent surgical history.  General Information     Row Name 21 1508          OT Time and Intention    Document Type  therapy note (daily note)  -CL     Mode of Treatment  occupational therapy  -CL     Row Name 21 1508          General Information    Existing Precautions/Restrictions  fall;other (see comments) R sided inattention and visual field deficits  -CL     Barriers to Rehab  medically complex;visual deficit  -CL     Row Name 21 1508          Cognition    Orientation Status (Cognition)  oriented x 3  -CL     Row Name 21 1508          Safety Issues, Functional Mobility    Impairments Affecting Function (Mobility)  balance;cognition;coordination;strength  -CL       User Key  (r) = Recorded By, (t) = Taken By, (c) = Cosigned By    Initials Name Provider Type    CL Jade Neely OT Occupational Therapist          Mobility/ADL's     Row Name 21 1510          Bed Mobility    Bed Mobility  supine-sit  -CL     Supine-Sit [place] (Bed Mobility)  supervision  -CL     Sit-Supine [place] (Bed Mobility)  supervision  -CL     Assistive Device (Bed Mobility)  bed rails;draw sheet;head of bed elevated   -     Row Name 02/03/21 1510          Transfers    Transfers  sit-stand transfer;toilet transfer  -     Sit-Stand [place] (Transfers)  contact guard;verbal cues  -     [place] Level (Toilet Transfer)  contact guard;verbal cues  -     Assistive Device (Toilet Transfer)  grab bars/safety frame  -     Row Name 02/03/21 1510          Toilet Transfer    Type (Toilet Transfer)  sit-stand;stand-sit  -     Row Name 02/03/21 1510          Functional Mobility    Functional Mobility- Ind. Level  contact guard assist  -     Functional Mobility-Distance (Feet)  -- to/from the bathroom  -     Row Name 02/03/21 1510          Activities of Daily Living    BADL Assessment/Intervention  lower body dressing;grooming;feeding;toileting;upper body dressing  -     Row Name 02/03/21 1510          Lower Body Dressing Assessment/Training    [place] Level (Lower Body Dressing)  socks;doff;supervision;don;moderate assist (50% patient effort)  -     Comment (Lower Body Dressing)  don undergarment w/ Min A  -     Row Name 02/03/21 1510          Grooming Assessment/Training    [place] Level (Grooming)  wash face, hands;supervision;hair care, combing/brushing;moderate assist (50% patient effort)  -CL     Position (Grooming)  edge of bed sitting  -     Row Name 02/03/21 1510          Upper Body Dressing Assessment/Training    [place] Level (Upper Body Dressing)  don;doff;moderate assist (50% patient effort)  -CL     Position (Upper Body Dressing)  sitting up in bed  -     Row Name 02/03/21 1510          Toileting Assessment/Training    [place] Level (Toileting)  adjust/manage clothing;perform perineal hygiene;supervision  -     Position (Toileting)  supported sitting;supported standing  -     Row Name 02/03/21 1510          Self-Feeding Assessment/Training    [place] Level (Feeding)  scoop food and bring to mouth;verbal cues;contact guard assist  -     Assistive Devices (Feeding)   built-up handle utensils  -CL       User Key  (r) = Recorded By, (t) = Taken By, (c) = Cosigned By    Initials Name Provider Type    Jade Mckeon OT Occupational Therapist        Obj/Interventions     Row Name 02/03/21 1517          Balance    Static Sitting Balance  WFL;sitting, edge of bed  -CL     Dynamic Sitting Balance  WFL;sitting, edge of bed  -CL     Static Standing Balance  WFL;supported  -CL     Dynamic Standing Balance  mild impairment;supported  -CL       User Key  (r) = Recorded By, (t) = Taken By, (c) = Cosigned By    Initials Name Provider Type    Jade Mckeon, ALICIA Occupational Therapist        Goals/Plan    No documentation.       Clinical Impression     Row Name 02/03/21 1517          Pain Scale: Numbers Pre/Post-Treatment    Pretreatment Pain Rating  0/10 - no pain  -CL     Posttreatment Pain Rating  0/10 - no pain  -CL     Row Name 02/03/21 1517          Plan of Care Review    Plan of Care Reviewed With  patient  -CL     Progress  improving  -CL     Outcome Summary  Pt CGA for functional mobility though conts to require assist for all ADL performance. Pt is performed self feeding w/ AE and compensatory strategies. Pt conts to be limited d/t R sided visual field deficits. Recommend cont skilled IPOT POC. Recommend pt DC to IP rehab.  -CL     Row Name 02/03/21 1517          Therapy Plan Review/Discharge Plan (OT)    Anticipated Discharge Disposition (OT)  inpatient rehabilitation facility  -     Row Name 02/03/21 1517          Vital Signs    Pre Systolic BP Rehab  145  -CL     Pre Treatment Diastolic BP  87  -CL     Post Systolic BP Rehab  126  -CL     Post Treatment Diastolic BP  85  -CL     Pretreatment Heart Rate (beats/min)  65  -CL     Posttreatment Heart Rate (beats/min)  63  -CL     Pre SpO2 (%)  97  -CL     O2 Delivery Pre Treatment  room air  -CL     Post SpO2 (%)  95  -CL     O2 Delivery Post Treatment  room air  -CL     Pre Patient Position  Supine  -CL     Post Patient Position   Supine  -CL     Row Name 02/03/21 1517          Positioning and Restraints    Pre-Treatment Position  in bed  -CL     Post Treatment Position  bed  -CL     In Bed  notified nsg;fowlers;call light within reach;encouraged to call for assist;exit alarm on;side rails up x3  -CL       User Key  (r) = Recorded By, (t) = Taken By, (c) = Cosigned By    Initials Name Provider Type    Jade Mckeon OT Occupational Therapist        Outcome Measures     Row Name 02/03/21 1521          How much help from another is currently needed...    Putting on and taking off regular lower body clothing?  2  -CL     Bathing (including washing, rinsing, and drying)  2  -CL     Toileting (which includes using toilet bed pan or urinal)  3  -CL     Putting on and taking off regular upper body clothing  2  -CL     Taking care of personal grooming (such as brushing teeth)  3  -CL     Eating meals  3  -CL     AM-PAC 6 Clicks Score (OT)  15  -CL     Row Name 02/03/21 1521          Modified Abby Scale    Modified [place] Scale  3 - Moderate disability.  Requiring some help, but able to walk without assistance.  -CL     Row Name 02/03/21 1521          Functional Assessment    Outcome Measure Options  AM-PAC 6 Clicks Daily Activity (OT)  -CL       User Key  (r) = Recorded By, (t) = Taken By, (c) = Cosigned By    Initials Name Provider Type    Jade Mckeon OT Occupational Therapist        Occupational Therapy Education                 Title: PT OT SLP Therapies (In Progress)     Topic: Occupational Therapy (In Progress)     Point: ADL training (In Progress)     Description:   Instruct learner(s) on proper safety adaptation and remediation techniques during self care or transfers.   Instruct in proper use of assistive devices.              Learning Progress Summary           Patient Acceptance, E, NR by CL at 2/3/2021 1522    Acceptance, E, NR by CL at 2/2/2021 0942    Acceptance, E, NR by CL at 2/1/2021 1210    Acceptance, E, NR by CS at  1/31/2021 1154                   Point: Home exercise program (Not Started)     Description:   Instruct learner(s) on appropriate technique for monitoring, assisting and/or progressing therapeutic exercises/activities.              Learner Progress:  Not documented in this visit.          Point: Precautions (In Progress)     Description:   Instruct learner(s) on prescribed precautions during self-care and functional transfers.              Learning Progress Summary           Patient Acceptance, E, NR by CL at 2/3/2021 1522    Acceptance, E, NR by CL at 2/2/2021 0942    Acceptance, E, NR by CL at 2/1/2021 1210    Acceptance, E, NR by CS at 1/31/2021 1154    Acceptance, E, NR by CS at 1/30/2021 0927                   Point: Body mechanics (In Progress)     Description:   Instruct learner(s) on proper positioning and spine alignment during self-care, functional mobility activities and/or exercises.              Learning Progress Summary           Patient Acceptance, E, NR by CL at 2/3/2021 1522    Acceptance, E, NR by CL at 2/2/2021 0942    Acceptance, E, NR by CL at 2/1/2021 1210    Acceptance, E, NR by CS at 1/31/2021 1154    Acceptance, E, NR by CS at 1/30/2021 0927                               User Key     Initials Effective Dates Name Provider Type Discipline     04/03/18 -  Jade Neely, OT Occupational Therapist OT     10/21/20 -  Sudhir Ortega OT Occupational Therapist OT              OT Recommendation and Plan     Plan of Care Review  Plan of Care Reviewed With: patient  Progress: improving  Outcome Summary: Pt CGA for functional mobility though conts to require assist for all ADL performance. Pt is performed self feeding w/ AE and compensatory strategies. Pt conts to be limited d/t R sided visual field deficits. Recommend cont skilled IPOT POC. Recommend pt DC to IP rehab.     Time Calculation:   Time Calculation- OT     Row Name 02/03/21 1522 02/03/21 1349          Time Calculation- OT    OT Start  Time  1409  -CL  --     OT Received On  02/03/21  -CL  --     OT Goal Re-Cert Due Date  02/09/21  -CL  --        Timed Charges    06889 - Gait Training Minutes   --  16  -SJ     31442 - OT Therapeutic Activity Minutes  15  -CL  --     12047 - OT Self Care/Mgmt Minutes  25  -CL  --       User Key  (r) = Recorded By, (t) = Taken By, (c) = Cosigned By    Initials Name Provider Type    Janneth Obando, PT Physical Therapist    CL Jade Neely, OT Occupational Therapist        Therapy Charges for Today     Code Description Service Date Service Provider Modifiers Qty    19425319518 HC OT THERAPEUTIC ACT EA 15 MIN 2/2/2021 Jade Neely OT GO 1    56612999720 HC OT SELF CARE/MGMT/TRAIN EA 15 MIN 2/2/2021 Jade Neely OT GO 1    14336975032 HC OT THERAPEUTIC ACT EA 15 MIN 2/3/2021 Jade Neely OT GO 1    41330231362 HC OT SELF CARE/MGMT/TRAIN EA 15 MIN 2/3/2021 Jade Neely OT GO 2               Jade Neely OT  2/3/2021

## 2021-02-04 LAB
ANION GAP SERPL CALCULATED.3IONS-SCNC: 6 MMOL/L (ref 5–15)
BASOPHILS # BLD AUTO: 0.04 10*3/MM3 (ref 0–0.2)
BASOPHILS NFR BLD AUTO: 0.4 % (ref 0–1.5)
BUN SERPL-MCNC: 16 MG/DL (ref 6–20)
BUN/CREAT SERPL: 22.9 (ref 7–25)
CALCIUM SPEC-SCNC: 9.6 MG/DL (ref 8.6–10.5)
CHLORIDE SERPL-SCNC: 105 MMOL/L (ref 98–107)
CO2 SERPL-SCNC: 32 MMOL/L (ref 22–29)
CREAT SERPL-MCNC: 0.7 MG/DL (ref 0.57–1)
DEPRECATED RDW RBC AUTO: 44.9 FL (ref 37–54)
EOSINOPHIL # BLD AUTO: 0.24 10*3/MM3 (ref 0–0.4)
EOSINOPHIL NFR BLD AUTO: 2.7 % (ref 0.3–6.2)
ERYTHROCYTE [DISTWIDTH] IN BLOOD BY AUTOMATED COUNT: 13.8 % (ref 12.3–15.4)
GFR SERPL CREATININE-BSD FRML MDRD: 87 ML/MIN/1.73
GLUCOSE SERPL-MCNC: 101 MG/DL (ref 65–99)
HCT VFR BLD AUTO: 51.8 % (ref 34–46.6)
HGB BLD-MCNC: 17 G/DL (ref 12–15.9)
IMM GRANULOCYTES # BLD AUTO: 0.05 10*3/MM3 (ref 0–0.05)
IMM GRANULOCYTES NFR BLD AUTO: 0.6 % (ref 0–0.5)
LYMPHOCYTES # BLD AUTO: 3.51 10*3/MM3 (ref 0.7–3.1)
LYMPHOCYTES NFR BLD AUTO: 39.2 % (ref 19.6–45.3)
MCH RBC QN AUTO: 29.6 PG (ref 26.6–33)
MCHC RBC AUTO-ENTMCNC: 32.8 G/DL (ref 31.5–35.7)
MCV RBC AUTO: 90.1 FL (ref 79–97)
MONOCYTES # BLD AUTO: 0.74 10*3/MM3 (ref 0.1–0.9)
MONOCYTES NFR BLD AUTO: 8.3 % (ref 5–12)
NEUTROPHILS NFR BLD AUTO: 4.38 10*3/MM3 (ref 1.7–7)
NEUTROPHILS NFR BLD AUTO: 48.8 % (ref 42.7–76)
NRBC BLD AUTO-RTO: 0 /100 WBC (ref 0–0.2)
PLATELET # BLD AUTO: 274 10*3/MM3 (ref 140–450)
PMV BLD AUTO: 11 FL (ref 6–12)
POTASSIUM SERPL-SCNC: 4.3 MMOL/L (ref 3.5–5.2)
RBC # BLD AUTO: 5.75 10*6/MM3 (ref 3.77–5.28)
SODIUM SERPL-SCNC: 143 MMOL/L (ref 136–145)
WBC # BLD AUTO: 8.96 10*3/MM3 (ref 3.4–10.8)

## 2021-02-04 PROCEDURE — 97116 GAIT TRAINING THERAPY: CPT

## 2021-02-04 PROCEDURE — 85025 COMPLETE CBC W/AUTO DIFF WBC: CPT | Performed by: INTERNAL MEDICINE

## 2021-02-04 PROCEDURE — 80048 BASIC METABOLIC PNL TOTAL CA: CPT | Performed by: INTERNAL MEDICINE

## 2021-02-04 PROCEDURE — 92526 ORAL FUNCTION THERAPY: CPT

## 2021-02-04 PROCEDURE — 97110 THERAPEUTIC EXERCISES: CPT

## 2021-02-04 PROCEDURE — 99233 SBSQ HOSP IP/OBS HIGH 50: CPT | Performed by: INTERNAL MEDICINE

## 2021-02-04 PROCEDURE — 92507 TX SP LANG VOICE COMM INDIV: CPT

## 2021-02-04 RX ADMIN — LEVETIRACETAM 500 MG: 500 TABLET, FILM COATED ORAL at 21:04

## 2021-02-04 RX ADMIN — HYDRALAZINE HYDROCHLORIDE 25 MG: 25 TABLET, FILM COATED ORAL at 12:30

## 2021-02-04 RX ADMIN — HYDRALAZINE HYDROCHLORIDE 25 MG: 25 TABLET, FILM COATED ORAL at 02:23

## 2021-02-04 RX ADMIN — FAMOTIDINE 20 MG: 20 TABLET, FILM COATED ORAL at 08:27

## 2021-02-04 RX ADMIN — LEVETIRACETAM 500 MG: 500 TABLET, FILM COATED ORAL at 08:28

## 2021-02-04 RX ADMIN — FAMOTIDINE 20 MG: 20 TABLET, FILM COATED ORAL at 16:58

## 2021-02-04 RX ADMIN — MULTIPLE VITAMINS W/ MINERALS TAB 1 TABLET: TAB at 08:27

## 2021-02-04 RX ADMIN — AMLODIPINE BESYLATE 10 MG: 10 TABLET ORAL at 08:27

## 2021-02-04 NOTE — PROGRESS NOTES
Continued Stay Note  [facility]     Patient Name: Arlene Calderon  MRN: [id number]  Today's Date: 2/4/2021    Admit Date: 1/29/2021    Discharge Plan     Row Name 02/04/21 1354       Plan    Plan  update    Patient/Family in Agreement with Plan  yes    Plan Comments  Per MD rounds, patient to discharge when bed available and insurance approved.  Called Trish with [facility] who advises that their MD is questioning the fact patient was given IV medications for elevated BP as well as concerns that patient is a new diagnosis for COVID and could have worsening symptoms and so precert has not been started.  CM has requested clarification, liaison will reach out to MD.  CM following.  Patient plan is to discharge to [facility] when bed offered.    Final Discharge Disposition Code  62 - inpatient rehab facility        Discharge Codes    No documentation.             Racheal Castro RN

## 2021-02-04 NOTE — PLAN OF CARE
Goal Outcome Evaluation:  Plan of Care Reviewed With: patient  Progress: improving  Outcome Summary: Patient continues to progress well towards goals. She transferred supine>sit with sup, STS with CGA, and ambulated aroung room 72'+24' without AD with CGA, demonstrating inattention to R side as well as instability with dynamic gait tasks, requiring Min A. VSS on RA. Will continue to progress as appropriate.

## 2021-02-04 NOTE — PLAN OF CARE
Goal Outcome Evaluation:  Plan of Care Reviewed With: patient  Progress: no change  Outcome Summary: Patient remains on RA without c/o soa. Oxygen saturation greater than 94% throughout the shift. Patient SBP elevated over 140 this shift. PRN hydralizine given per order. No noted neurological changes. Patient Bradycardic while sleeping rate in 40's

## 2021-02-04 NOTE — PLAN OF CARE
Goal Outcome Evaluation:  Plan of Care Reviewed With: patient  Progress: improving  Outcome Summary: Pt VSS and on RA. Pt alert and oriented, up with assist x1. Pt speech garbled @ times w/ expressive asphagia. Pt blood pressure evelated >140, PRN hydralazine given one time. Will continue to monitor.

## 2021-02-04 NOTE — THERAPY TREATMENT NOTE
Acute Care - Speech Language Pathology Treatment Note  [facility]     Patient Name: Arlene Calderon  : 1965  MRN: [id number]  Today's Date: 2021               Admit Date: 2021     Visit Dx:    ICD-10-CM ICD-9-CM   1. Nontraumatic cortical hemorrhage of cerebral hemisphere, unspecified laterality (CMS/HCC)  I61.1 431   2. Impaired functional mobility, balance, gait, and endurance  Z74.09 V49.89   3. Oropharyngeal dysphagia  R13.12 787.22   4. Cognitive communication deficit  R41.841 799.52     Patient Active Problem List   Diagnosis   • Acute left frontal ICH 2021. Smaller right parieto-occipital ICH also present (CMS/HCC)   • COVID-19 virus detected at admission   • PFO with right-to-left atrial level shunt on Valsalva   • Hypertension     History reviewed. No pertinent past medical history.  History reviewed. No pertinent surgical history.     SLP EVALUATION (last 72 hours)      SLP SLC Evaluation     Row Name 21 1530 21 1100                Communication Assessment/Intervention    Document Type  therapy note (daily note)  -MP  therapy note (daily note)  -AC       Subjective Information  no complaints  -MP  no complaints  -AC       Patient Observations  alert;cooperative  -MP  alert;cooperative  -AC       Patient/Family/Caregiver Comments/Observations  No family present  -MP  No family present.  -AC       Care Plan Review  care plan/treatment goals reviewed;patient/other agree to care plan  -MP  evaluation/treatment results reviewed;care plan/treatment goals reviewed;risks/benefits reviewed;current/potential barriers reviewed;patient/other agree to care plan  -AC       Patient Effort  good  -MP  good  -AC          Pain    Additional Documentation  Pain Scale: FACES Pre/Post-Treatment (Group)  -MP  --          Pain Scale: Numbers Pre/Post-Treatment    Pretreatment Pain Rating  --  0/10 - no pain  -AC       Posttreatment Pain Rating  --  0/10 - no pain  -AC          Pain Scale: FACES  Pre/Post-Treatment    Pain: FACES Scale, Pretreatment  0-->no hurt  -MP  --       Posttreatment Pain Rating  0-->no hurt  -MP  --          SLP Clinical Impressions    Daily Summary of Progress (SLP)  progress toward functional goals is good  -MP  progress toward functional goals as expected  -AC       Rehab Potential/Prognosis  good  -MP  --       [facility] [place] Criteria for Skilled Therapy Interventions Met  yes  -MP  --       Functional Impact  difficulty communicating wants, needs  -MP  --       Plan for Continued Treatment (SLP)  Good participation in tx. Throat clear/cough w/ ice/thin H2O. Continue dysphagia level III & honey-thick. Will continue to address communication & dysphagia in tx. Pt would benefit from continued SLP services @ next level of care.  -MP  Pt cont to exhibit aphasia affecting auditory comprehension and more so verbal expression, as well as dysarthria and cognitive-linguistic deficits. Pt able to follow simple commands, but had more difficulty w/ complex/multi-step commands. Also difficulty w/ more complex y/n questions. Pt reponded well to semantic cues when experiencing word finding difficulty. Pt appeared to be tolerating items on allowed diet w/o s/sxs aspiration--pudding and honey-thick liquids via cup--but needs frequent cues to use compensatory strategies (small bites/sips, alternate bites/sips, multiple swallows per bolus). Pt would benefit from cont'd SLP tx for cognitive-linguistic deficits, dysarthria, aphasia, and oropharyngeal dysphagia.  -AC          Recommendations    Therapy Frequency (SLP SLC)  5 days per week  -MP  5 days per week  -AC       Predicted Duration Therapy Intervention (Days)  until discharge  -MP  until discharge  -AC       Anticipated Discharge Disposition (SLP)  inpatient rehabilitation facility;anticipate therapy at next level of care  -MP  inpatient rehabilitation facility;anticipate therapy at next level of care  -AC         User Key  (r) = Recorded By, (t) = Taken  By, (c) = Cosigned By    Initials Name Effective Dates    AC Neela Floyd, MS CCC-SLP 07/27/17 -     MP Willy Cruz, MS CCC-SLP 06/19/19 -              EDUCATION  The patient has been educated in the following areas:     Cognitive Impairment Communication Impairment Dysphagia (Swallowing Impairment) Modified Diet Instruction.    SLP Recommendation and Plan           SLC Criteria for Skilled Therapy Interventions Met: yes  Anticipated Discharge Disposition (SLP): inpatient rehabilitation facility, anticipate therapy at next level of care        Predicted Duration Therapy Intervention (Days): until discharge  Daily Summary of Progress (SLP): progress toward functional goals is good  Plan for Continued Treatment (SLP): Good participation in tx. Throat clear/cough w/ ice/thin H2O. Continue dysphagia level III & honey-thick. Will continue to address communication & dysphagia in tx. Pt would benefit from continued SLP services @ next level of care.             Plan of Care Reviewed With: patient      SLP GOALS     Row Name 02/04/21 1530 02/02/21 1100          Oral Nutrition/Hydration Goal 1 (SLP)    Oral Nutrition/Hydration Goal 1, SLP  LTG: Pt will tolerate modified po diet of puree w/ honey thick liquids w/o overt s/s of aspiraiton/distress w/ 100% acc given min cues  -MP  LTG: Pt will tolerate modified po diet of puree w/ honey thick liquids w/o overt s/s of aspiraiton/distress w/ 100% acc given min cues  -AC     Time Frame (Oral Nutrition/Hydration Goal 1, SLP)  by discharge  -MP  by discharge  -AC     Barriers (Oral Nutrition/Hydration Goal 1, SLP)  --  No overt clinical s/sxs aspiration w/ pudding or honey-thick liquid via cup.  -AC     Progress/Outcomes (Oral Nutrition/Hydration Goal 1, SLP)  continuing progress toward goal  -MP  good progress toward goal  -AC        Oral Nutrition/Hydration Goal 2 (SLP)    Oral Nutrition/Hydration Goal 2, SLP  STG: Pt will accept trials of nectar thick liquids, soft solids  w/o overt s/s of aspiration/distress w/ 90% acc w/o cues to determine pt readiness to participate in instrumental dysphagia re-evaluation.  -MP  STG: Pt will accept trials of nectar thick liquids, soft solids w/o overt s/s of aspiration/distress w/ 90% acc w/o cues to determine pt readiness to participate in instrumental dysphagia re-evaluation.  -AC     Time Frame (Oral Nutrition/Hydration Goal 2, SLP)  short term goal (STG)  -MP  short term goal (STG)  -AC     Barriers (Oral Nutrition/Hydration Goal 2, SLP)  Throat clear/cough w/ thin H2O  -MP  --     Progress/Outcomes (Oral Nutrition/Hydration Goal 2, SLP)  continuing progress toward goal  -MP  goal ongoing  -AC        Labial Strengthening Goal 1 (SLP)    Increase Labial Tone  labial resistance exercises;swallow trials  -MP  labial resistance exercises;swallow trials  -AC     [place]/Accuracy (Labial Strengthening Goal 1, SLP)  with moderate cues (50-74% accuracy)  -MP  with moderate cues (50-74% accuracy)  -AC     Time Frame (Labial Strengthening Goal 1, SLP)  short term goal (STG)  -MP  short term goal (STG)  -AC     Progress/Outcomes (Labial Strengthening Goal 1, SLP)  goal ongoing  -MP  goal ongoing  -AC        Lingual Strengthening Goal 1 (SLP)    Activity (Lingual Strengthening Goal 1, SLP)  increase lingual tone/sensation/control/coordination/movement;increase tongue back strength  -MP  --     Increase Lingual Tone/Sensation/Control/Coordination/Movement  swallow trials;lingual resistance exercises  -MP  swallow trials;lingual resistance exercises  -AC     Increase Tongue Back Strength  swallow trials;lingual resistance exercises  -MP  swallow trials;lingual resistance exercises  -AC     [place]gg/Accuracy (Lingual Strengthening Goal 1, SLP)  with moderate cues (50-74% accuracy)  -MP  with moderate cues (50-74% accuracy)  -AC     Time Frame (Lingual Strengthening Goal 1, SLP)  short term goal (STG)  -MP  short term goal (STG)  -AC      Progress/Outcomes (Lingual Strengthening Goal 1, SLP)  goal ongoing  -MP  goal ongoing  -AC        Pharyngeal Strengthening Exercise Goal 1 (SLP)    Activity (Pharyngeal Strengthening Goal 1, SLP)  increase timing;increase superior movement of the hyolaryngeal complex;increase anterior movement of the hyolaryngeal complex;increase closure at entrance to airway/closure of airway at glottis;increase squeeze/positive pressure generation;increase tongue base retraction  -MP  --     Increase Timing  prepping - 3 second prep or suck swallow or 3-step swallow  -MP  prepping - 3 second prep or suck swallow or 3-step swallow  -AC     Increase Superior Movement of the Hyolaryngeal Complex  falsetto  -MP  falsetto  -AC     Increase Anterior Movement of the Hyolaryngeal Complex  chin tuck against resistance (CTAR)  -MP  chin tuck against resistance (CTAR)  -AC     Increase Closure at Entrance to Airway/Closure of Airway at Glottis  supraglottic swallow  -MP  supraglottic swallow  -AC     Increase Squeeze/Positive Pressure Generation  hard effortful swallow  -MP  hard effortful swallow  -AC     Increase Tongue Base Retraction  jeana  -MP  jeana  -AC     [place]/Accuracy (Pharyngeal Strengthening Goal 1, SLP)  with moderate cues (50-74% accuracy)  -MP  with moderate cues (50-74% accuracy)  -AC     Time Frame (Pharyngeal Strengthening Goal 1, SLP)  short term goal (STG)  -MP  short term goal (STG)  -AC     Barriers (Pharyngeal Strengthening Goal 1, SLP)  Focused on CTAR & hard effortful  -MP  3 sec prep x5, effortful swallow x5 w/ mod cues each.  -AC     Progress/Outcomes (Pharyngeal Strengthening Goal 1, SLP)  continuing progress toward goal  -MP  good progress toward goal  -AC        Swallow Compensatory Strategies Goal 1 (SLP)    Activity (Swallow Compensatory Strategies/Techniques Goal 1, SLP)  compensatory strategies;during meal intake;during p.o. trials;small bites;small cup sips;food/liquid placed on stronger left  side;alternate food/liquid intake;extra swallow per bolus  -MP  compensatory strategies;during meal intake;during p.o. trials;small bites;small cup sips;food/liquid placed on stronger left side;alternate food/liquid intake;extra swallow per bolus  -AC     [place]/Accuracy (Swallow Compensatory Strategies/Techniques Goal 1, SLP)  with minimal cues (75-90% accuracy)  -MP  with minimal cues (75-90% accuracy)  -AC     Time Frame (Swallow Compensatory Strategies/Techniques Goal 1, SLP)  short term goal (STG)  -MP  short term goal (STG)  -AC     Barriers (Swallow Compensatory Strategies/Techniques Goal 1, SLP)  --  Pt needed frequent cues to use compensatory strategies, particularly strategies for clearing residue (mult swallows, liquid wash).   -AC     Progress/Outcomes (Swallow Compensatory Strategies/Techniques Goal 1, SLP)  continuing progress toward goal  -MP  continuing progress toward goal  -AC        Comprehend Questions Goal 1 (SLP)    Improve Ability to Comprehend Questions Goal 1 (SLP)  simple yes/no questions;complex yes/no questions;100%;independently (over 90% accuracy)  -MP  simple yes/no questions;complex yes/no questions;100%;independently (over 90% accuracy)  -AC     Time Frame (Comprehend Questions Goal 1, SLP)  short term goal (STG)  -MP  short term goal (STG)  -AC     Progress (Ability to Comprehend Questions Goal 1, SLP)  70%;independently (over 90% accuracy)  -MP  50%;independently (over 90% accuracy)  -AC     Progress/Outcomes (Comprehend Questions Goal 1, SLP)  good progress toward goal  -MP  continuing progress toward goal;goal revised this date  -AC        Follow Directions Goal 2 (SLP)    Improve Ability to Follow Directions Goal 1 (SLP)  multistep commands;90%;with minimal cues (75-90%)  -MP  multistep commands;90%;with minimal cues (75-90%)  -AC     Time Frame (Follow Directions Goal 1, SLP)  short term goal (STG)  -MP  short term goal (STG)  -AC     Progress (Ability to Follow  Directions Goal 1, SLP)  --  20%;with minimal cues (75-90%)  -AC     Progress/Outcomes (Follow Directions Goal 1, SLP)  goal ongoing  -MP  goal revised this date  -AC     Comment (Follow Directions Goal 1, SLP)  --  Pt met goals for 1-step directions with and w/o objects w/ 100% acc indptly - goal adjusted for higher-level.  -AC        Word Retrieval Skills Goal 1 (SLP)    Improve Word Retrieval Skills By Goal 1 (SLP)  confrontational naming task;completing open ended unstructured sentence;answer WH question with one word;80%;with minimal cues (75-90%)  -MP  confrontational naming task;completing open ended unstructured sentence;answer WH question with one word;80%;with minimal cues (75-90%)  -AC     Time Frame (Word Retrieval Goal 1, SLP)  short term goal (STG)  -MP  short term goal (STG)  -AC     Progress (Word Retrieval Skills Goal 1, SLP)  60%;with minimal cues (75-90%);with moderate cues (50-74%)  -MP  60%;with minimal cues (75-90%)  -AC     Progress/Outcomes (Word Retrieval Goal 1, SLP)  continuing progress toward goal  -MP  continuing progress toward goal  -AC     Comment (Word Retrieval Goal 1, SLP)  --  Confrontational naming of common objects, wh questions. Responded well to semantic cues.  -AC        Articulation Goal 1 (SLP)    Improve Articulation Goal 1 (SLP)  by over-articulating at word level;90%;with minimal cues (75-90%)  -MP  by over-articulating at word level;90%;with minimal cues (75-90%)  -AC     Time Frame (Articulation Goal 1, SLP)  short term goal (STG)  -MP  short term goal (STG)  -AC     Progress/Outcomes (Articulation Goal 1, SLP)  goal ongoing  -MP  goal ongoing  -AC        Orientation Goal 1 (SLP)    Improve Orientation Through Goal 1 (SLP)  demonstrating orientation to year;demonstrating orientation to place;demonstrating orientation to disease/impairment;100%;with minimal cues (75-90%)  -MP  demonstrating orientation to year;demonstrating orientation to place;demonstrating orientation  to disease/impairment;100%;with minimal cues (75-90%)  -AC     Time Frame (Orientation Goal 1, SLP)  short term goal (STG)  -MP  short term goal (STG)  -AC     Progress (Orientation Goal 1, SLP)  30%;with minimal cues (75-90%)  -MP  30%;with minimal cues (75-90%)  -AC     Progress/Outcomes (Orientation Goal 1, SLP)  continuing progress toward goal  -MP  continuing progress toward goal  -AC     Comment (Orientation Goal 1, SLP)  --  Pt oriented to place, not time or situation.  -AC        Functional Problem Solving Skills Goal 1 (SLP)    Improve Problem Solving Through Goal 1 (SLP)  determine solutions to simple ADL/safety problems;90%;with minimal cues (75-90%)  -MP  determine solutions to simple ADL/safety problems;90%;with minimal cues (75-90%)  -AC     Time Frame (Problem Solving Goal 1, SLP)  short term goal (STG)  -MP  short term goal (STG)  -AC     Progress/Outcomes (Problem Solving Goal 1, SLP)  goal ongoing  -MP  goal ongoing  -AC        Additional Goal 1 (SLP)    Additional Goal 1, SLP  LTG: Improve cognitive-communication skills in order to participate in care while in hospital setting with 90% accuracy and cues  -MP  LTG: Improve cognitive-communication skills in order to participate in care while in hospital setting with 90% accuracy and cues  -AC     Time Frame (Additional Goal 1, SLP)  by discharge  -MP  by discharge  -AC     Progress/Outcomes (Additional Goal 1, SLP)  continuing progress toward goal  -MP  continuing progress toward goal  -AC       User Key  (r) = Recorded By, (t) = Taken By, (c) = Cosigned By    Initials Name Provider Type    Neela Mai, MS CCC-SLP Speech and Language Pathologist    Willy Jeong MS CCC-SLP Speech and Language Pathologist                  Time Calculation:     Time Calculation- SLP     Row Name 02/04/21 1612             Time Calculation- SLP    SLP Start Time  1530  -MP      SLP Received On  02/04/21  -MP        User Key  (r) = Recorded By, (t) = Taken  By, (c) = Cosigned By    Initials Name Provider Type    Willy Jeong MS CCC-SLP Speech and Language Pathologist          Therapy Charges for Today     Code Description Service Date Service Provider Modifiers Qty    24407399338 HC ST TREATMENT SPEECH 1 2/4/2021 Willy Cruz MS CCC-SLP GN 1    [id number] HC ST TREATMENT SWALLOW 2 2/4/2021 Willy Cruz MS CCC-SLP GN 1          Patient was not wearing a face mask and did exhibit coughing during this therapy encounter.  Procedure performed was aerosolizing, involved close contact (within 6 feet for at least 15 minutes or longer), and did not involve contact with infectious secretions or specimens.  Therapist used appropriate personal protective equipment including gloves, standard procedure mask, eye protection, gown and N95 mask.  Appropriate PPE was worn during the entire therapy session.  Hand hygiene was completed before and after therapy session.            MS GALE Anderson  2/4/2021

## 2021-02-04 NOTE — PLAN OF CARE
Goal Outcome Evaluation:  Plan of Care Reviewed With: patient        SLP treatment completed. Will continue to address communication & dysphagia. Please see note for further details and recommendations.

## 2021-02-04 NOTE — PROGRESS NOTES
[facility] Medicine Services  PROGRESS NOTE    Patient Name: Arlene Calderon  : 1965  MRN: [id number]    Date of Admission: 2021  Primary Care Physician: Maggi Talamantes APRN    Subjective   Subjective     CC:  Follow up for ICH    HPI:  No acute events overnight. Still has slurred speech. Denies weakness in one extremity or the other. Had headache this morning that resolved after she got tylenol.    ROS:  Gen- No fevers, chills  CV- No chest pain, palpitations  Resp- No cough, dyspnea  GI- No N/V/D, abd pain     Objective   Objective     Vital Signs:   Temp:  [96.7 °F (35.9 °C)-97.5 °F (36.4 °C)] 96.7 °F (35.9 °C)  Heart Rate:  [45-74] 54  Resp:  [16-18] 18  BP: (117-151)/(70-92) 141/82  Total (NIH Stroke Scale): 6     Physical Exam:  With patient's consent, physical exam was conducted via visual telemedicine encounter due to patient's current isolation requirements in the interest of PPE conservation.    Constitutional: No acute distress, awake, alert, nontoxic, normal body habitus  HENT: NCAT, MMM, no conjunctival injection  Respiratory: Good effort, nonlabored respirations on room air   Cardiovascular:  tele with NSR  Musculoskeletal: No edema, normal muscle tone and mass for age  Psychiatric: Appropriate affect, good insight and judgement, cooperative  Neurologic: Oriented x 3, movements symmetric BUE and BLE, speech slurred   Skin: No visible rashes, no jaundice seen on exposed skin through window      Results Reviewed:  Results from last 7 days   Lab Units 21  0842 21  0935 21  0603 21  0502  21  2105 21  1811   WBC 10*3/mm3 8.96 9.60 9.94 16.26*   < >  --  13.92*   HEMOGLOBIN g/dL 17.0* 16.3* 16.0* 14.4   < >  --  15.3   HEMATOCRIT % 51.8* 49.6* 48.2* 44.0   < >  --  45.1   PLATELETS 10*3/mm3 274 298 293 290   < >  --  359   INR   --   --   --   --   --  1.12 0.97   PROCALCITONIN ng/mL  --   --   --  0.03  --   --   --     < > =  values in this interval not displayed.     Results from last 7 days   Lab Units 02/04/21  0842 02/03/21  0935 02/02/21  0603  01/29/21  1811   SODIUM mmol/L 143 142 139   < > 143   POTASSIUM mmol/L 4.3 4.1 3.6   < > 4.4   CHLORIDE mmol/L 105 105 104   < > 105   CO2 mmol/L 32.0* 29.0 26.0   < > 26.6   BUN mg/dL 16 19 18   < > 9   CREATININE mg/dL 0.70 0.78 0.58   < > 0.67   GLUCOSE mg/dL 101* 101* 80   < > 126*   CALCIUM mg/dL 9.6 9.1 8.6   < > 9.7   ALT (SGPT) U/L  --   --   --   --  17   AST (SGOT) U/L  --   --   --   --  19   TROPONIN T ng/mL  --   --   --   --  <0.010    < > = values in this interval not displayed.     Estimated Creatinine Clearance: 78.3 mL/min (by C-G formula based on SCr of 0.7 mg/dL).    Microbiology Results Abnormal     Procedure Component Value - Date/Time    COVID PRE-OP / PRE-PROCEDURE SCREENING ORDER (NO ISOLATION) - Swab, Nasopharynx [[id number]]  (Abnormal) Collected: 01/30/21 0242    Lab Status: Final result Specimen: Swab from Nasopharynx Updated: 01/30/21 0749    Narrative:      The following orders were created for panel order COVID PRE-OP / PRE-PROCEDURE SCREENING ORDER (NO ISOLATION) - Swab, Nasopharynx.  Procedure                               Abnormality         Status                     ---------                               -----------         ------                     COVID-19,CEPHEID,AARON IN-...[[id number]]  Abnormal            Final result                 Please view results for these tests on the individual orders.    COVID-19,CEPHEID,AARON IN-HOUSE(OR EMERGENT/ADD-ON),NP SWAB IN TRANSPORT MEDIA 3-4 HR TAT - Swab, Nasopharynx [[id number]]  (Abnormal) Collected: 01/30/21 0242    Lab Status: Final result Specimen: Swab from Nasopharynx Updated: 01/30/21 0749     COVID19 Detected    Narrative:      Fact sheet for providers: https://www.fda.gov/media/854253/download     Fact sheet for patients: https://www.fda.gov/media/709299/download          Imaging Results (Last 24 Hours)      ** No results found for the last 24 hours. **          Results for orders placed during the hospital encounter of 01/29/21   Adult Transthoracic Echo Complete W/ Cont if Necessary Per Protocol    Narrative · Saline test results are positive with valsalva manuever for right to   left atrial level shunt.  · Estimated left ventricular EF = 60% Estimated left ventricular EF was in   agreement with the calculated left ventricular EF. Left ventricular   ejection fraction appears to be 56 - 60%. Left ventricular systolic   function is normal.  · Left ventricular diastolic function was normal.  · Normal right ventricular cavity size, wall thickness, systolic function   and septal motion noted.  · No evidence of pulmonary hypertension is present.  · There is no evidence of pericardial effusion.  · No significant structural or functional valvular abnormality   demonstrated.  · Small patent foramen ovale present.          I have reviewed the medications:  Scheduled Meds:amLODIPine, 10 mg, Oral, Q24H  famotidine, 20 mg, Oral, BID AC  levETIRAcetam, 500 mg, Oral, BID  multivitamin with minerals, 1 tablet, Oral, Daily      Continuous Infusions:   PRN Meds:.•  acetaminophen  •  acetaminophen  •  calcium gluconate IVPB **AND** calcium gluconate IVPB **AND** Calcium, Ionized  •  hydrALAZINE  •  influenza vaccine  •  magnesium sulfate **OR** magnesium sulfate **OR** magnesium sulfate  •  potassium chloride  •  potassium phosphate infusion greater than 15 mMoles **OR** potassium phosphate infusion greater than 15 mMoles **OR** potassium phosphate **OR** sodium phosphate IVPB **OR** sodium phosphate IVPB **OR** sodium phosphate IVPB    Assessment/Plan   Assessment & Plan     Active Hospital Problems    Diagnosis  POA   • **Acute left frontal ICH 1/29/2021. Smaller right parieto-occipital ICH also present (CMS/HCC) [I61.9]  Yes   • PFO with right-to-left atrial level shunt on Valsalva [Q21.1]  Not Applicable   • Hypertension [I10]   Yes   • COVID-19 virus detected at admission [U07.1]  Yes      Resolved Hospital Problems    Diagnosis Date Resolved POA   • Leukocytosis [D72.829] 02/02/2021 Yes        Brief Hospital Course to date:  Arlene Calderon is a 55 y.o. female with a PMH of HTN, small PFO, previous smoker, who was admitted on 1/29 for ICH. Patient was initially seen at [facility] with left later gaze, confusion, and right sided weakness associated with hypotension and low grade temp. CTH showed large left frontal intraparenchymal hemorrhage with vasogenic edema and localized mass effect. Initial NIH 22 with GCS 4-3-6. ICH volume of 18, and ICH score of 1. She was transferred to [facility] ICU on 1/29 for neurosurgery evaluation. They believed she had hemorrhagic conversion of acute CVA vs hypertensive hemorrhage. No surgical intervention was recommended. She was started on keppra for seizure ppx.  ICOVID-19 detected on PCR, but patient was asymptomatic other than low grade temperature of 100.1. she was transferred out of ICU on 2/3.   Patient and problems new to me today     Large left frontal ICH with mass effect and vasogenic edema   -thought to be due to hypertension and spontaneous  Hemorrhage, no underlying pathology per neurology evaluation   -continues to have slurred speech   -Nneurosurgery have evaluated patient, no need for surgical intervention at this time, will need repeat MRI brain in 6 to 8 weeks  -no antiplatelets or AC needed  -continue with controlling blood pressure   -echo with small PFO  -PT/OT/SLP/CM - plan for CH     HTN   -continue amlodipine     COVID 19 detected  -asymptomatic   -positive test on 1/30, continue isolation for 10 days from initial positive test, 2/9     DVT Prophylaxis:  scd      Disposition: I expect the patient to be discharged to rehab once accepted.    CODE STATUS:   Code Status and Medical Interventions:   Ordered at: 01/29/21 2054     Level Of Support Discussed With:    Patient     Code Status:    CPR      Medical Interventions (Level of Support Prior to Arrest):    Full       Idania Goel MD  02/04/21

## 2021-02-04 NOTE — THERAPY TREATMENT NOTE
Patient Name: Arlene Calderon  : 1965    MRN: [id number]                              Today's Date: 2021       Admit Date: 2021    Visit Dx:     ICD-10-CM ICD-9-CM   1. Nontraumatic cortical hemorrhage of cerebral hemisphere, unspecified laterality (CMS/HCC)  I61.1 431   2. Impaired functional mobility, balance, gait, and endurance  Z74.09 V49.89   3. Oropharyngeal dysphagia  R13.12 787.22   4. Cognitive communication deficit  R41.841 799.52     Patient Active Problem List   Diagnosis   • Acute left frontal ICH 2021. Smaller right parieto-occipital ICH also present (CMS/HCC)   • COVID-19 virus detected at admission   • PFO with right-to-left atrial level shunt on Valsalva   • Hypertension     History reviewed. No pertinent past medical history.  History reviewed. No pertinent surgical history.  General Information     Row Name 21 1133          Physical Therapy Time and Intention    Document Type  therapy note (daily note)  -NS     Mode of Treatment  individual therapy;physical therapy  -NS     Row Name 21 1133          General Information    Patient Profile Reviewed  yes  -NS     Existing Precautions/Restrictions  fall;other (see comments) R sided inattention and visual deficits  -NS     Row Name 21 1133          Cognition    Orientation Status (Cognition)  oriented x 3  -NS     Row Name 21 1133          Safety Issues, Functional Mobility    Safety Issues Affecting Function (Mobility)  safety precaution awareness;safety precautions follow-through/compliance;insight into deficits/self-awareness  -NS     Impairments Affecting Function (Mobility)  balance;cognition;coordination;strength  -NS     Cognitive Impairments, Mobility Safety/Performance  awareness, need for assistance;insight into deficits/self-awareness;problem-solving/reasoning;safety precaution awareness;safety precaution follow-through;judgment  -NS     Comment, Safety Issues/Impairments (Mobility)  Pt mildly  impulsive with poor safety awareness.  -NS       User Key  (r) = Recorded By, (t) = Taken By, (c) = Cosigned By    Initials Name Provider Type    Lora Hoskins PT Physical Therapist        Mobility     Row Name 02/04/21 1133          Bed Mobility    Bed Mobility  supine-sit  -NS     Supine-Sit [place] (Bed Mobility)  supervision  -NS     Sit-Supine [place] (Bed Mobility)  supervision  -NS     Assistive Device (Bed Mobility)  bed rails;head of bed elevated  -NS     Row Name 02/04/21 1133          Transfers    Comment (Transfers)  Pt with mild impulsivity.  -NS     Row Name 02/04/21 1133          Sit-Stand Transfer    Sit-Stand [place]er (Transfers)  contact guard  -NS     Assistive Device (Sit-Stand Transfers)  other (see comments) gait belt  -NS     Row Name 02/04/21 1133          Gait/Stairs (Locomotion)    [place]ettinger Level (Gait)  contact guard  -NS     Assistive Device (Gait)  other (see comments) gait belt  -NS     Distance in Feet (Gait)  72+24  -NS     Deviations/Abnormal Patterns (Gait)  gait speed decreased;base of support, narrow;chin decreased;stride length decreased  -NS     Bilateral Gait Deviations  forward flexed posture  -NS     Right Sided Gait Deviations  weight shift ability decreased  -NS     Comment (Gait/Stairs)  Patient ambulated around room without AD, demonstrating mild unsteadiness and requiring cues safety awareness and attending to R side. Pt completed marching and backwards walking with unsteadiness requiring Min A.  -NS       User Key  (r) = Recorded By, (t) = Taken By, (c) = Cosigned By    Initials Name Provider Type    Lora Hoskins PT Physical Therapist        Obj/Interventions     Row Name 02/04/21 1133          Motor Skills    Therapeutic Exercise  hip;ankle;knee  -NS     Row Name 02/04/21 1133          Hip (Therapeutic Exercise)    Hip (Therapeutic Exercise)  strengthening exercise  -NS     Hip Strengthening (Therapeutic Exercise)  bilateral;heel  slides;marching while seated;aBduction;aDduction;10 repetitions mini squats x10  -NS     Row Name 02/04/21 1133          Knee (Therapeutic Exercise)    Knee (Therapeutic Exercise)  strengthening exercise  -NS     Knee Strengthening (Therapeutic Exercise)  bilateral;SLR (straight leg raise);LAQ (long arc quad);10 repetitions  -NS     Row Name 02/04/21 1133          Ankle (Therapeutic Exercise)    Ankle (Therapeutic Exercise)  AROM (active range of motion)  -NS     Ankle AROM (Therapeutic Exercise)  bilateral;dorsiflexion;plantarflexion;standing;10 repetitions  -NS     Row Name 02/04/21 1133          Balance    Balance Assessment  sitting static balance;sitting dynamic balance;standing static balance;standing dynamic balance  -NS     Static Sitting Balance  WFL;unsupported;sitting, edge of bed  -NS     Dynamic Sitting Balance  WFL;unsupported;sitting, edge of bed  -NS     Static Standing Balance  WFL;unsupported;standing  -NS     Dynamic Standing Balance  mild impairment;supported;standing  -NS     Comment, Balance  Patient completed standing ther ex, marching, and backwards walking to challenge balance.  -NS       User Key  (r) = Recorded By, (t) = Taken By, (c) = Cosigned By    Initials Name Provider Type    Lora Hoskins PT Physical Therapist        Goals/Plan    No documentation.       Clinical Impression     [facility] Name 02/04/21 1133          Pain    Additional Documentation  Pain Scale: Numbers Pre/Post-Treatment (Group)  -NS     Row Name 02/04/21 1133          Pain Scale: Numbers Pre/Post-Treatment    Pretreatment Pain Rating  2/10  -NS     Posttreatment Pain Rating  2/10  -NS     Pain Location - Side  Bilateral  -NS     Pain Location - Orientation  anterior  -NS     Pain Location  head  -NS     Row Name 02/04/21 1133          Plan of Care Review    Plan of Care Reviewed With  patient  -NS     Progress  improving  -NS     Outcome Summary  Patient continues to progress well towards goals. She transferred  supine>sit with sup, STS with CGA, and ambulated aroung room 72'+24' without AD with CGA, demonstrating inattention to R side as well as instability with dynamic gait tasks, requiring Min A. VSS on RA. Will continue to progress as appropriate.  -NS     Row Name 02/04/21 1133          Vital Signs    Pre Systolic BP Rehab  106  -NS     Pre Treatment Diastolic BP  66  -NS     Post Systolic BP Rehab  145  -NS     Post Treatment Diastolic BP  80  -NS     Pretreatment Heart Rate (beats/min)  72  -NS     Posttreatment Heart Rate (beats/min)  61  -NS     Pre SpO2 (%)  95  -NS     O2 Delivery Pre Treatment  room air  -NS     Post SpO2 (%)  95  -NS     O2 Delivery Post Treatment  room air  -NS     Pre Patient Position  Supine  -NS     Intra Patient Position  Standing  -NS     Post Patient Position  Supine  -NS     Row Name 02/04/21 1133          Positioning and Restraints    Pre-Treatment Position  in bed  -NS     Post Treatment Position  bed  -NS     In Bed  supine;call light within reach;encouraged to call for assist;exit alarm on;side rails up x3 Pt declined sitting in chair.  -NS       User Key  (r) = Recorded By, (t) = Taken By, (c) = Cosigned By    Initials Name Provider Type    Lora Hoskins, PT Physical Therapist        Outcome Measures     Row Name 02/04/21 1133          How much help from another person do you currently need...    Turning from your back to your side while in flat bed without using bedrails?  4  -NS     Moving from lying on back to sitting on the side of a flat bed without bedrails?  4  -NS     Moving to and from a bed to a chair (including a wheelchair)?  3  -NS     Standing up from a chair using your arms (e.g., wheelchair, bedside chair)?  3  -NS     Climbing 3-5 steps with a railing?  3  -NS     To walk in hospital room?  3  -NS     AM-PAC 6 Clicks Score (PT)  20  -NS     Row Name 02/04/21 1133          Modified Abby Scale    Modified Lanier Scale  3 - Moderate disability.  Requiring some  help, but able to walk without assistance.  -NS     Row Name 02/04/21 1133          Functional Assessment    Outcome Measure Options  AM-PAC 6 Clicks Basic Mobility (PT)  -NS       User Key  (r) = Recorded By, (t) = Taken By, (c) = Cosigned By    Initials Name Provider Type    Lora Hoskins, PT Physical Therapist        Physical Therapy Education                 Title: PT OT SLP Therapies (In Progress)     Topic: Physical Therapy (Done)     Point: Mobility training (Done)     Learning Progress Summary           Patient Acceptance, E, VU,NR by NS at 2/4/2021 1348    Acceptance, E, NR by KG at 2/2/2021 1503    Acceptance, E,D, NR by  at 2/1/2021 1039    Acceptance, E,D, VU,DU,NR by  at 1/31/2021 1059    Acceptance, E, NR by [facility] at 1/30/2021 0815                   Point: Home exercise program (Done)     Learning Progress Summary           Patient Acceptance, E, VU,NR by NS at 2/4/2021 1348    Acceptance, E, NR by KG at 2/2/2021 1503    Acceptance, E,D, NR by  at 2/1/2021 1039    Acceptance, E,D, VU,DU,NR by  at 1/31/2021 1059                   Point: Body mechanics (Done)     Learning Progress Summary           Patient Acceptance, E, VU,NR by NS at 2/4/2021 1348    Acceptance, E, NR by KG at 2/2/2021 1503    Acceptance, E,D, NR by  at 2/1/2021 1039    Acceptance, E,D, VU,DU,NR by  at 1/31/2021 1059                   Point: Precautions (Done)     Learning Progress Summary           Patient Acceptance, E, VU,NR by NS at 2/4/2021 1348    Acceptance, E, NR by KG at 2/2/2021 1503    Acceptance, E,D, NR by  at 2/1/2021 1039    Acceptance, E,D, VU,DU,NR by  at 1/31/2021 1059                               User Key     Initials Effective Dates Name Provider Type Discipline     06/19/15 -  Janneth Hill, PT Physical Therapist PT    [facility] 07/17/19 -  Dulce Friend, PT Physical Therapist PT     06/19/15 -  Gris Farah, PT Physical Therapist PT    [facility] 05/22/20 -  Cher Alamo, PT Physical  Therapist PT    NS 09/10/19 -  Lora Miller PT Physical Therapist PT              PT Recommendation and Plan     Plan of Care Reviewed With: patient  Progress: improving  Outcome Summary: Patient continues to progress well towards goals. She transferred supine>sit with sup, STS with CGA, and ambulated aroung room 72'+24' without AD with CGA, demonstrating inattention to R side as well as instability with dynamic gait tasks, requiring Min A. VSS on RA. Will continue to progress as appropriate.     Time Calculation:   PT Charges     Row Name 02/04/21 1133             Time Calculation    Start Time  1133  -NS      PT Received On  02/04/21  -NS      PT Goal Re-Cert Due Date  02/09/21  -NS         Timed Charges    80924 - PT Therapeutic Exercise Minutes  13  -NS      52408 - Gait Training Minutes   10  -NS        User Key  (r) = Recorded By, (t) = Taken By, (c) = Cosigned By    Initials Name Provider Type    Lora Hoskins, PT Physical Therapist        Therapy Charges for Today     Code Description Service Date Service Provider Modifiers Qty    38596664216 HC PT THER PROC EA 15 MIN 2/4/2021 Lora Miller, PT GP 1    93017340416 HC GAIT TRAINING EA 15 MIN 2/4/2021 Lora Miller, PT GP 1          PT G-Codes  Outcome Measure Options: AM-PAC 6 Clicks Basic Mobility (PT)  AM-PAC 6 Clicks Score (PT): 20  AM-PAC 6 Clicks Score (OT): 15  Modified McCracken Scale: 3 - Moderate disability.  Requiring some help, but able to walk without assistance.    Lora Miller PT  2/4/2021

## 2021-02-04 NOTE — PAYOR COMM NOTE
"Ref # [id number]  Bre Dangelo RN, BSN  Phone # [phone]  Fax # [phone]  Arlene Calderon (55 y.o. Female)     Date of Birth Social Security Number Address Home Phone MRN    1965   [address]   [place] [address] [phone] [id number]    [employer] Status          None        Admission Date Admission Type Admitting Provider Attending Provider Department, Room/Bed    21 Urgent Idania Goel MD Anciro, Audree, MD [facility] 6B, N637/1    Discharge Date Discharge Disposition Discharge Destination                       Attending Provider: Idania Goel MD    Allergies: No Known Allergies    Isolation: Contact Air   Infection: COVID (confirmed) (21)   Code Status: CPR    Ht: 157.5 cm (62.01\")   Wt: 63 kg (138 lb 14.4 oz)    Admission Cmt: None   Principal Problem: Acute left frontal ICH 2021. Smaller right parieto-occipital ICH also present (CMS/[facility]) [I61.9]                 Active Insurance as of 2021     Patient has no active insurance coverage on file for 2021.            Operative/Procedure Notes (last 48 hours) (Notes from 21 1059 through 21 1059)    No notes of this type exist for this encounter.          Physician Progress Notes (last 48 hours) (Notes from 21 1059 through 21 1059)      Yuliya Branch MD at 21 0811              [facility] Medicine Services  PROGRESS NOTE    Patient Name: Arlene Calderon  : 1965  MRN: [id number]    Date of Admission: 2021  Primary Care Physician: Maggi Talamantes APRN    Subjective   Subjective     CC:  ICH    HPI:  Patient doing ok- speech sometimes difficult to understand but patient is oriented and reports she feels ok. Trying to call her friend Jorden. She reports that she is doing well otherwise. Understands plan of care    ROS:  Gen- No fevers, chills  CV- No chest pain, palpitations  Resp- No cough, dyspnea  GI- No N/V/D, abd " pain         Objective   Objective     Vital Signs:   Temp:  [96.2 °F (35.7 °C)-98.6 °F (37 °C)] 96.2 °F (35.7 °C)  Heart Rate:  [48-71] 54  Resp:  [16-18] 18  BP: (108-149)/(62-93) 134/83  Total (NIH Stroke Scale): 6     Physical Exam:  With patient's consent, physical exam was conducted via visual telemedicine encounter due to patient's current isolation requirements in the interest of PPE conservation.    Constitutional: No acute distress, awake, alert, nontoxic, normal body habitus  HENT: NCAT, MMM, no conjunctival injection  Respiratory: Good effort, nonlabored respirations   Cardiovascular:  tele with NSR  Musculoskeletal: No edema, normal muscle tone and mass for age  Psychiatric: Appropriate affect, good insight and judgement, cooperative  Neurologic: Oriented x 3, movements symmetric BUE and BLE, speech somewhat difficult to understand with aphasia but mostly clear  Skin: No visible rashes, no jaundice seen on exposed skin through window        Results Reviewed:  Results from last 7 days   Lab Units 02/02/21  0603 02/01/21  0502 01/31/21  0609 01/29/21  2105 01/29/21  1811   WBC 10*3/mm3 9.94 16.26* 13.86*  --  13.92*   HEMOGLOBIN g/dL 16.0* 14.4 15.9  --  15.3   HEMATOCRIT % 48.2* 44.0 49.0*  --  45.1   PLATELETS 10*3/mm3 293 290 329  --  359   INR   --   --   --  1.12 0.97   PROCALCITONIN ng/mL  --  0.03  --   --   --      Results from last 7 days   Lab Units 02/02/21  0603 02/01/21  0502 01/31/21  0609 01/29/21  1811   SODIUM mmol/L 139 138 139  --  143   POTASSIUM mmol/L 3.6 4.2 3.7   < > 4.4   CHLORIDE mmol/L 104 106 106  --  105   CO2 mmol/L 26.0 23.0 24.0  --  26.6   BUN mg/dL 18 21* 23*  --  9   CREATININE mg/dL 0.58 0.63 0.57  --  0.67   GLUCOSE mg/dL 80 93 131*  --  126*   CALCIUM mg/dL 8.6 8.7 9.4  --  9.7   ALT (SGPT) U/L  --   --   --   --  17   AST (SGOT) U/L  --   --   --   --  19   TROPONIN T ng/mL  --   --   --   --  <0.010    < > = values in this interval not displayed.     Estimated  Creatinine Clearance: 93.4 mL/min (by C-G formula based on SCr of 0.58 mg/dL).    Microbiology Results Abnormal     Procedure Component Value - Date/Time    COVID PRE-OP / PRE-PROCEDURE SCREENING ORDER (NO ISOLATION) - Swab, Nasopharynx [[id number]]  (Abnormal) Collected: 01/30/21 0242    Lab Status: Final result Specimen: Swab from Nasopharynx Updated: 01/30/21 0749    Narrative:      The following orders were created for panel order COVID PRE-OP / PRE-PROCEDURE SCREENING ORDER (NO ISOLATION) - Swab, Nasopharynx.  Procedure                               Abnormality         Status                     ---------                               -----------         ------                     COVID-19,CEPHEID,AARON IN-...[[id number]]  Abnormal            Final result                 Please view results for these tests on the individual orders.    COVID-19,CEPHEID,AARON IN-HOUSE(OR EMERGENT/ADD-ON),NP SWAB IN TRANSPORT MEDIA 3-4 HR TAT - Swab, Nasopharynx [[id number]]  (Abnormal) Collected: 01/30/21 0242    Lab Status: Final result Specimen: Swab from Nasopharynx Updated: 01/30/21 0749     COVID19 Detected    Narrative:      Fact sheet for providers: https://www.fda.gov/media/959378/download     Fact sheet for patients: https://www.fda.gov/media/946197/download          Imaging Results (Last 24 Hours)     Procedure Component Value Units Date/Time    FL Video Swallow With Speech Single Contrast [[id number]] Collected: 02/01/21 1505     Updated: 02/02/21 1055    Narrative:      EXAMINATION: FL VIDEO SWALLOW W SPEECH SINGLE-CONTRAST-     INDICATION: dysphagia; Z74.09-Other reduced mobility; R13.10-Dysphagia,  unspecified; R41.841-Cognitive communication deficit     TECHNIQUE: 1 minute and 48 seconds of fluoroscopic time was used for  this exam. 1 associated image was saved. The patient was evaluated in  the seated lateral position while taking a variety of consistencies of  barium by mouth under the direction of speech pathology.      COMPARISON: NONE     FINDINGS: 1 minute and 48 seconds of fluoroscopy provided for a modified  barium swallow. Please see speech therapy report for full details and  recommendations.          Impression:      Fluoroscopy provided for a modified barium swallow. Please  see speech therapy report for full details and recommendations.         This report was finalized on 2/2/2021 10:51 AM by Dr. Philip Dey.             Results for orders placed during the hospital encounter of 01/29/21   Adult Transthoracic Echo Complete W/ Cont if Necessary Per Protocol    Narrative · Saline test results are positive with valsalva manuever for right to   left atrial level shunt.  · Estimated left ventricular EF = 60% Estimated left ventricular EF was in   agreement with the calculated left ventricular EF. Left ventricular   ejection fraction appears to be 56 - 60%. Left ventricular systolic   function is normal.  · Left ventricular diastolic function was normal.  · Normal right ventricular cavity size, wall thickness, systolic function   and septal motion noted.  · No evidence of pulmonary hypertension is present.  · There is no evidence of pericardial effusion.  · No significant structural or functional valvular abnormality   demonstrated.  · Small patent foramen ovale present.          I have reviewed the medications:  Scheduled Meds:amLODIPine, 10 mg, Oral, Q24H  famotidine, 20 mg, Intravenous, Q12H  levETIRAcetam, 500 mg, Intravenous, Q12H  multivitamin with minerals, 1 tablet, Oral, Daily      Continuous Infusions:   PRN Meds:.•  acetaminophen  •  calcium gluconate IVPB **AND** calcium gluconate IVPB **AND** Calcium, Ionized  •  hydrALAZINE  •  influenza vaccine  •  magnesium sulfate **OR** magnesium sulfate **OR** magnesium sulfate  •  potassium chloride  •  potassium phosphate infusion greater than 15 mMoles **OR** potassium phosphate infusion greater than 15 mMoles **OR** potassium phosphate **OR** sodium phosphate IVPB  **OR** sodium phosphate IVPB **OR** sodium phosphate IVPB    Assessment/Plan   Assessment & Plan     Active Hospital Problems    Diagnosis  POA   • **Acute left frontal ICH 1/29/2021. Smaller right parieto-occipital ICH also present (CMS/HCC) [I61.9]  Yes   • PFO with right-to-left atrial level shunt on Valsalva [Q21.1]  Not Applicable   • Hypertension [I10]  Yes   • COVID-19 virus detected at admission [U07.1]  Yes      Resolved Hospital Problems    Diagnosis Date Resolved POA   • Leukocytosis [D72.829] 02/02/2021 Yes        Brief Hospital Course to date:  female smoker found at home with right-sided weakness and left lateral gaze and confusion. EMS was called and transferred to [facility] 1/29/2021 where she was found to be hypertensive with a low-grade temp.  CT scan revealed large left frontal intraparenchymal hemorrhage with surrounding vasogenic edema and localized mass-effect. Initial NIH was 22 with a GCS of 4-3-6.  ICH volume of 18 and ICH score of 1. Was transferred to [facility] the evening of 1/29/2021 and neurosurgery saw no sign of aneurysmal cause of bleed. Was felt most likely to have a hemorrhagic conversion of stroke or hypertensive hemorrhage. Surgical intervention was not recommended and focus has been on blood pressure control.  In addition patient was started on Keppra 1/31/2021.  Subsequent to her admission patient was documented to have a positive COVID-19 PCR. She was however asymptomatic other than a low-grade temp of 100.1. Patient has been monitored in the ICU and transferred to [facility] floor 2/3 with hospitalists assuming attending role.         Large left frontal ICH with mass effect and vasogenic edema  ---has been seen by neurology/NSGY- Dr Villa follows- no role for surgical intervention, will need f/u imaging with repeat MRI in 6-8weeks  ---no role for antiplatelets/anticoagulants   ---focus on blood pressure contol at this time- she has on/off required cardene gtt with ICU addition of  metoprolol/amlodipine  ---ICU had d/w Dr Lewis of neurology who reveiwed imaging and did not feel either bleed appeared to look like ischemic event -He also noted that it did look like there was underlying pathology and although atypical felt these areas were a spontaneous hemorrhagic bleed in a patient with hypertension  ---ECHO done with small PFO but otherwise normal   ---PT/OT/SLP/CM following- planning for [facility]     HTN  ---now controlled on current medications, monitor        COVID 19  ---1/30/21 + testing. Will need to remain is isolation for 10 days if remains asymptomatic without treatment-- 2/9/21.   ---continues to be on room air     DVT Prophylaxis:  [facility] given ICH      Disposition: I expect the patient to be discharged to rehab pending acceptance, doing well.    Patient declines offer to call family today    CODE STATUS:   Code Status and Medical Interventions:   Ordered at: 01/29/21 2054     Level Of Support Discussed With:    Patient     Code Status:    CPR     Medical Interventions (Level of Support Prior to Arrest):    Full       Yuliya Branch MD  02/03/21                Electronically signed by Yuliya Branch MD at 02/03/21 1237     Chaka Gracia MD at 02/02/21 1604          Intensivist Note     2/2/2021  Hospital Day: 4  * No surgery found *  ICU Stays Timeline            Hospital Admission: 01/29/21 2031 - Current  ICU stays: 1      In Date/Time Event Department ICU Stay Duration     01/29/21 2031 Admission [facility] 2B ICU 3 days 19 hours 33 minutes             Ms. Arlene Calderon, 55 y.o. female is followed for:    Acute left frontal ICH 1/29/2021. Smaller right parieto-occipital ICH also present (CMS/HCC)    PFO with right-to-left atrial level shunt on Valsalva    Hypertension    COVID-19 virus detected at admission       SUBJECTIVE     57-year-old white female smoker found at home with right-sided weakness and left lateral gaze and confusion. EMS was called and transferred to [facility] 1/29/2021  where she was found to be hypertensive with a low-grade temp.  CT scan revealed large left frontal intraparenchymal hemorrhage with surrounding vasogenic edema and localized mass-effect. Initial NIH was 22 with a GCS of 4-3-6.  ICH volume of 18 and ICH score of 1. Was transferred to [facility] the evening of 1/29/2021 and neurosurgery saw no sign of aneurysmal cause of bleed. Was felt most likely to have a hemorrhagic conversion of stroke or hypertensive hemorrhage. Surgical intervention was not recommended and focus has been on blood pressure control.  In addition patient was started on Keppra 1/31/2021.  Subsequent to her admission patient was documented to have a positive COVID-19 PCR. She was however asymptomatic other than a low-grade temp of 100.1 without any other symptoms such as nasal congestion cough, loss of taste or smell etc.    Interval history: Patient appears improved today.  Still has some mild aphasia with difficulty in word finding mild dysarthria but is easier to understand.  She follows simple commands, but still having problems with things such as using a TV channel changer. Continued to require Cardene yesterday was able to wean off by this morning since the addition of metoprolol and amlodipine.  Unfortunately patient's heart rate dropped to 40 bpm today.  Metoprolol was discontinued and she remains on amlodipine.  Echocardiogram was ordered yesterday because of the bilateral nature of her intracranial hemorrhages as I wanted to make sure there was no valvular disease or source of cardiac emboli they could have induced an ischemic event which subsequently had hemorrhagic conversion.  I discussed the situation with Dr. Lewis who reviewed the MRI and did not think either bleed looked like an ischemic event with hemorrhagic transformation.  He also noted that it did look like there was underlying pathology and although atypical felt these areas were a spontaneous hemorrhagic bleed in a patient with  "hypertension.    Regarding her COVID-19 viral infection, she remains asymptomatic.  She is afebrile without dyspnea, cough, congestion, pleuritic pain, myalgias, or any difficulty with sense of smell or taste.  No nausea, vomiting, melena, hematochezia, or hematemesis.  No difficulty voiding      ROS: Per subjective, all other systems reviewed and were negative.    The patient's relevant PMH, PSH, FH, and SH were reviewed and updated in Epic as appropriate. Allergies and Medications reviewed.    OBJECTIVE     /65 (BP Location: Left arm, Patient Position: Lying)   Pulse (!) 48   Temp 98.6 °F (37 °C) (Oral)   Resp 16   Ht 157.5 cm (62.01\")   Wt 60.3 kg (133 lb)   SpO2 96%   BMI 24.32 kg/m²           Flowsheet Rows      First Filed Value   Admission Height  157.5 cm (62.01\") Documented at 01/30/2021 0600   Admission Weight  58.8 kg (129 lb 10.1 oz) Documented at 01/30/2021 0600        Intake & Output (last day)       02/01 0701 - 02/02 0700 02/02 0701 - 02/03 0700    I.V. (mL/kg) 2295 (37.9) 482.4 (8)    IV Piggyback 100 100    Total Intake(mL/kg) 2395 (39.5) 582.4 (9.7)    Urine (mL/kg/hr) 3550 (2.4) 475 (0.9)    Total Output 3550 475    Net -1155 +107.4                Exam:  General Exam:  Well-developed middle-aged white female supine in bed in NAD  HEENT: Pupils equal and reactive. Nose and throat clear.  Neck:                          Supple, no JVD, thyromegaly, or adenopathy  Lungs: Clear anteriorly, laterally, posteriorly without wheezes rales or rhonchi  Cardiovascular: RRR without murmurs or gallops.  HR 56 bpm  Abdomen: Soft nontender without organomegaly or masses.   and rectal: Deferred.  Extremities: No cyanosis clubbing edema.  Neurologic:                 Symmetric strength. No focal deficits.  Speech is mildly dysarthric and she has difficulty with word finding.  Will follow simple commands but has difficulty with more complex problem such as changing the TV channel    Chest X-Ray: CXR " from 2/1/2021 was normal    Echocardiogram 2/2/2021: Reveals small patent foramen ovale with positive saline test with Valsalva maneuver for right to left atrial level shunt..  No valvular heart disease, LVEF is normal, and no evidence of diastolic dysfunction or pulmonary hypertension.    INFUSIONS  niCARdipine, 5-15 mg/hr, Last Rate: Stopped (02/02/21 0815)  sodium chloride, 75 mL/hr, Last Rate: 75 mL/hr (02/02/21 0836)        Results from last 7 days   Lab Units 02/02/21  0603 02/01/21  0502 01/31/21  0609   WBC 10*3/mm3 9.94 16.26* 13.86*   HEMOGLOBIN g/dL 16.0* 14.4 15.9   HEMATOCRIT % 48.2* 44.0 49.0*   PLATELETS 10*3/mm3 293 290 329     Results from last 7 days   Lab Units 02/02/21  0603 02/01/21  0502   SODIUM mmol/L 139 138   POTASSIUM mmol/L 3.6 4.2   CHLORIDE mmol/L 104 106   CO2 mmol/L 26.0 23.0   BUN mg/dL 18 21*   CREATININE mg/dL 0.58 0.63   GLUCOSE mg/dL 80 93   CALCIUM mg/dL 8.6 8.7     Results from last 7 days   Lab Units 02/02/21  0603 02/01/21  0502 01/31/21  0609   MAGNESIUM mg/dL 2.2 2.3 2.4   PHOSPHORUS mg/dL 3.0 3.3 3.2     Results from last 7 days   Lab Units 01/29/21  1811   ALK PHOS U/L 101   BILIRUBIN mg/dL 0.4   ALT (SGPT) U/L 17   AST (SGOT) U/L 19       No results found for: SEDRATE  No results found for: BNP  Lab Results   Component Value Date    CKTOTAL 56 01/29/2021    TROPONINT <0.010 01/29/2021     No results found for: TSH  No results found for: LACTATE  No results found for: CORTISOL      I reviewed the patient's results, images and medication.    Assessment/Plan   ASSESSMENT        Acute left frontal ICH 1/29/2021. Smaller right parieto-occipital ICH also present (CMS/HCC)    PFO with right-to-left atrial level shunt on Valsalva    Hypertension    COVID-19 virus detected at admission      DISCUSSION: Blood pressure under control and it appears that there has been no progression of her bilateral intracranial hemorrhages left greater than right.  We will leave on amlodipine alone  but has oral hydralazine prn which can be used if her pressure increases again.  She still has some word finding difficulty and dysarthria with some mild cognitive problems and would do better with rehab.  It still bothers me that she had bilateral intracranial hemorrhages.  While hypertensive, it was not that severe (although she required Cardene to keep SBP less than 140 until oral antihypertensives were started).  I do not think her Covid 19 infection had anything to do with this, and there was never any suggestion of vasculitis. Because of the bilateral nature of her hemorrhages I did obtain an echocardiogram which was unremarkable except for a small PFO which on Valsalva had a right to left shunt. There was however no pulmonary hypertension or valvular heart disease noted.  Neurosurgery feels that she can transfer to the floor and feels that rehab would be indicated with follow-up in their office with CT.  Getting into a rehab facility may be a difficult proposition because of her positive COVID-19 PCR on admission despite the fact that she is asymptomatic.    PLAN     1.  Transfer to telemetry and will ask the hospitalists to assume attending role  2.  Continue amlodipine  3.  If oral hydralazine is required prn will need to adjust antihypertensives  2.   to work on rehab facilities that would accept her with her positive COVID-19 test   3.  Lower extremity venous duplex has been ordered.  Neurology reviewed her MRI and does not think the areas of hemorrhage showed any ischemia (such as an embolic event) that led to a hemorrhagic conversion.  Would like however to rule out occult DVT and cryptogenic embolic stroke as she does have a PFO.     Plan of care and goals reviewed with multidisciplinary team at daily rounds.    I discussed the patient's findings and my recommendations with patient and nursing staff    Patient is critically ill due to bilateral intracerebral hemorrhages presumably related  to hypertension and has a high risk of life-threatening decline in condition.  She still requires continuous monitoring and frequent reassessment of condition for adjustment of management in order to lessen risk. .    Time spent Critical care 30 min (It does not include procedure time).    Electronically signed by Chaka Gracia MD, 02/02/21, 4:04 PM EST.   Pulmonary / Critical care medicine       Electronically signed by Chaka Gracia MD at 02/03/21 0704     Yoav Villa MD at 02/02/21 1214        HOD# : 4        Acute left frontal ICH 1/29/2021. Smaller right parieto-occipital ICH also present (CMS/HCC)    Leukocytosis    COVID-19 virus detected at admission      Temp:  [96.7 °F (35.9 °C)-98.3 °F (36.8 °C)] 97.6 °F (36.4 °C)  Heart Rate:  [] 51  Resp:  [16-20] 18  BP: ()/(55-88) 120/87  I/O last 3 completed shifts:  In: 3153.8 [I.V.:2953.8; IV Piggyback:200]  Out: 4750 [Urine:4750]  I/O this shift:  In: 582.4 [I.V.:482.4; IV Piggyback:100]  Out: 450 [Urine:450]  Vital signs were reviewed and documented in the chart      EXAM   Patient reported with stable word finding difficulty per nursing and charting patient with active pulmonary Covid infection        Impression plan    Patient with ICH-attempted to call Tawana friend her daughter is in [place] to update patient there is no role for surgical intervention    Patient will need follow-up with repeat imaging and MRI repeat with and without contrast in approximately 6 to 8 weeks    Patient needs rehab    No role for antiplatelet agents or anticoagulants please in this patient    Blood pressure control        Electronically signed by Yoav Villa MD at 02/02/21 1215       Racheal Castro RN      Case Management   Progress Notes   Signed   Date of Service:  02/03/21 1427   Creation Time:  02/03/21 1427            Signed             Continued Stay Note  [facility]     Patient Name: Arlene Calderon             MRN:  [id number]  Today's Date: 2/3/2021                       Admit Date: 1/29/2021          Discharge Plan      Row Name 02/03/21 1424           Plan     Plan  update     Patient/Family in Agreement with Plan  yes     Plan Comments  Per previous CM notes, referral called to Trish with [facility].  Spoke to Trish with [facility] and advised patient is nearing discharge who will submit referral for precert.  Spoke with patient via telehpne regarding discharge plan and advised that [facility] is reviewing her her referral and may have a bed offer soon.  Patient agreeable to plan.  No new discharge needs verbalized.  CM following.  Patient plan is to discharge to [facility] when bed offered and insurance approved.     Final Discharge Disposition Code  62 - inpatient rehab facility          Discharge Codes    No documentation.                  Racheal Castro RN

## 2021-02-05 ENCOUNTER — TELEPHONE (OUTPATIENT)
Dept: NEUROSURGERY | Facility: CLINIC | Age: 56
End: 2021-02-05

## 2021-02-05 VITALS
DIASTOLIC BLOOD PRESSURE: 59 MMHG | WEIGHT: 132 LBS | BODY MASS INDEX: 24.29 KG/M2 | OXYGEN SATURATION: 95 % | SYSTOLIC BLOOD PRESSURE: 104 MMHG | HEIGHT: 62 IN | RESPIRATION RATE: 16 BRPM | HEART RATE: 64 BPM | TEMPERATURE: 97 F

## 2021-02-05 DIAGNOSIS — I61.1 NONTRAUMATIC CORTICAL HEMORRHAGE OF CEREBRAL HEMISPHERE, UNSPECIFIED LATERALITY: Primary | ICD-10-CM

## 2021-02-05 PROCEDURE — 99239 HOSP IP/OBS DSCHRG MGMT >30: CPT | Performed by: INTERNAL MEDICINE

## 2021-02-05 PROCEDURE — 97535 SELF CARE MNGMENT TRAINING: CPT

## 2021-02-05 PROCEDURE — 97110 THERAPEUTIC EXERCISES: CPT

## 2021-02-05 RX ORDER — POLYETHYLENE GLYCOL 3350 17 G/17G
17 POWDER, FOR SOLUTION ORAL DAILY
Status: DISCONTINUED | OUTPATIENT
Start: 2021-02-05 | End: 2021-02-05 | Stop reason: HOSPADM

## 2021-02-05 RX ORDER — LEVETIRACETAM 500 MG/1
500 TABLET ORAL 2 TIMES DAILY
Qty: 60 TABLET | Refills: 0
Start: 2021-02-05 | End: 2022-02-08 | Stop reason: SDDI

## 2021-02-05 RX ORDER — AMLODIPINE BESYLATE 10 MG/1
10 TABLET ORAL
Start: 2021-02-06 | End: 2021-03-23 | Stop reason: DRUGHIGH

## 2021-02-05 RX ORDER — FAMOTIDINE 20 MG/1
20 TABLET, FILM COATED ORAL
Qty: 60 TABLET | Refills: 0
Start: 2021-02-05 | End: 2021-03-07

## 2021-02-05 RX ORDER — HYDRALAZINE HYDROCHLORIDE 25 MG/1
25 TABLET, FILM COATED ORAL EVERY 8 HOURS PRN
Qty: 30 TABLET | Refills: 0
Start: 2021-02-05 | End: 2021-03-23

## 2021-02-05 RX ADMIN — FAMOTIDINE 20 MG: 20 TABLET, FILM COATED ORAL at 09:31

## 2021-02-05 RX ADMIN — AMLODIPINE BESYLATE 10 MG: 10 TABLET ORAL at 09:36

## 2021-02-05 RX ADMIN — LEVETIRACETAM 500 MG: 500 TABLET, FILM COATED ORAL at 09:31

## 2021-02-05 RX ADMIN — POLYETHYLENE GLYCOL 3350 17 G: 17 POWDER, FOR SOLUTION ORAL at 09:36

## 2021-02-05 RX ADMIN — MULTIPLE VITAMINS W/ MINERALS TAB 1 TABLET: TAB at 09:31

## 2021-02-05 NOTE — PLAN OF CARE
Goal Outcome Evaluation:  Plan of Care Reviewed With: patient  Progress: improving  Outcome Summary: Vital stable. Patient alert and oriented. No neurological chages noted. Continues to have expressive aphagia. SBP under 140 this shift. Remains on room air with oxygen saturation greater then 94%.

## 2021-02-05 NOTE — CONSULTS
Diabetes Education    Patient Name:  Arlene Calderon  YOB: 1965  MRN: [id number]  Admit Date:  1/29/2021        Diabetes education consult per stroke protocol noted--per chart review pt does not have history of diabetes and does not take any home medications. Current A1c does fall within ADA diagnostic range for prediabetes. We tried to reach her multiple times by calling her room phone (unable to see in person due to COVID confirmed and isolation); updated primary RN via secure Epic message. Mailed prediabetes information and our contact information to pt's home. Thank you.      Electronically signed by:  Bre Hammer RN  02/05/21 15:11 EST

## 2021-02-05 NOTE — TELEPHONE ENCOUNTER
Caller: BRAYDEN WITH [facility]    Relationship: N/A    Best call back number: [phone]    What orders are you requesting (i.e. lab or imaging): MRI    In what timeframe would the patient need to come in: 6-8 WEEKS OUT     Where will you receive your lab/imaging services: [facility]    Additional notes: BRAYDEN WAS CALLING TO SCHEDULE A HOSPITAL FOLLOW UP FOR THE PATIENT. THE PATIENT WAS SEEN BY DR LARSON IN THE HOSPITAL ON 2/2/21, HIS NOTES SAY TO FOLLOW UP IN 6-8 WEEKS WITH MRI REPEAT W AND WO CONTRAST, BUT THERE IS NO ORDER FOR AN MRI IN THE CHART.

## 2021-02-05 NOTE — PROGRESS NOTES
Case Management Discharge Note      Final Note: Received a call from Trish with [facility] who advises that they can offer patient a bed today on the CVA2 unit.  MD notified.  Spoke with patient via telephone with direct visualization through window regarding discharge plan and advised that she has  bed today and the ambulance will transport today at 1500.  Patient verbalizes understanding and agreement with plan.  Called patient emergency contact, Tawana, to advise who verbalizes understanding and agreement with plan.  No new discharge needs verbalized.  Patient plan is to discharge to [facility] CV via  Ambulance for transport scheduled for 1500.  Nursing to call report to [phone].         Selected Continued Care - Admitted Since 1/29/2021     Destination Coordination complete    Service Provider Selected Services Address Phone Fax Patient Preferred    [facility] [address] [facility] [address] [phone] --          Durable Medical Equipment    No services have been selected for the patient.              Dialysis/Infusion    No services have been selected for the patient.              Home Medical Care    No services have been selected for the patient.              Therapy    No services have been selected for the patient.              Community Resources    No services have been selected for the patient.                       Final Discharge Disposition Code: 62 - inpatient rehab facility

## 2021-02-05 NOTE — PLAN OF CARE
Goal Outcome Evaluation:  Plan of Care Reviewed With: patient  Progress: improving  Outcome Summary: Pt. with increased independence with grooming progressing to combing hair SBA from mod A prior session.  Pt. only oriented to person and place today and several periods of confusion and problem solving noted ie. eating cream of wheat with fork instead of spoon even though none staying on fork, with cues to switch to spoon. Pt. progressed to donning socks and slipper with mod A to SBA with extra time.  SBA OOB and CGA with mvmt in room.  Cont OT POC.

## 2021-02-05 NOTE — DISCHARGE INSTR - APPOINTMENTS
[employer] PHONE: [phone] TO APPLY FOR SOCIAL SECURITY     You have an appointment with Maggi Talamantes APRN  on February 19, 2021 @ 10:00 AM.   Call them if you have any questions. Phone: [phone]  [address]  [facility] [place] [facility] [place] [address]

## 2021-02-05 NOTE — CONSULTS
Attempted to call patient for diabetes education, no answer. Contacted primary RN through Epic message to ask if patient interested in our services. Awaiting response.

## 2021-02-05 NOTE — THERAPY TREATMENT NOTE
Patient Name: Arlene Calderon  : 1965    MRN: [id number]                              Today's Date: 2021       Admit Date: 2021    Visit Dx:     ICD-10-CM ICD-9-CM   1. Nontraumatic cortical hemorrhage of cerebral hemisphere, unspecified laterality (CMS/HCC)  I61.1 431   2. Impaired functional mobility, balance, gait, and endurance  Z74.09 V49.89   3. Oropharyngeal dysphagia  R13.12 787.22   4. Cognitive communication deficit  R41.841 799.52     Patient Active Problem List   Diagnosis   • Acute left frontal ICH 2021. Smaller right parieto-occipital ICH also present (CMS/HCC)   • COVID-19 virus detected at admission   • PFO with right-to-left atrial level shunt on Valsalva   • Hypertension     History reviewed. No pertinent past medical history.  History reviewed. No pertinent surgical history.  General Information     Row Name 21 0854          OT Time and Intention    Document Type  therapy note (daily note)  -LUBA     Mode of Treatment  individual therapy;occupational therapy  -     Row Name 21 0854          General Information    Existing Precautions/Restrictions  fall R inattention and visual deficit, swallow precautions.  -LUBA     Barriers to Rehab  medically complex;cognitive status;visual deficit  -     Row Name 21 0854          Cognition    Orientation Status (Cognition)  oriented to;person;place;disoriented to;situation;time Pt. stating it was January and year as .  Noted several episode of confusion throughout session.  -     Row Name 21 0854          Safety Issues, Functional Mobility    Impairments Affecting Function (Mobility)  balance;cognition;coordination;strength  -       User Key  (r) = Recorded By, (t) = Taken By, (c) = Cosigned By    Initials Name Provider Type    Maritza Tomlinson, OT Occupational Therapist          Mobility/ADL's     Row Name 21 0856          Bed Mobility    Supine-Sit [place] (Bed Mobility)  supervision  -LUBA      Assistive Device (Bed Mobility)  bed rails;head of bed elevated  -LUBA     Comment (Bed Mobility)  good speed and safety  -     Row Name 02/05/21 0856          Transfers    Transfers  sit-stand transfer  -LUBA     Sit-Stand [place] (Transfers)  contact guard  -     Row Name 02/05/21 0856          Sit-Stand Transfer    Assistive Device (Sit-Stand Transfers)  other (see comments) gait belt  -     Row Name 02/05/21 0856          Functional Mobility    Functional Mobility- Ind. Level  contact guard assist  -     Functional Mobility- Device  other (see comments) gait belt  -     Functional Mobility-Distance (Feet)  20  -LUBA     Functional Mobility- Safety Issues  step length decreased  -     Functional Mobility- Comment  pt. able to move around room when told where to go  -     Row Name 02/05/21 0856          Activities of Daily Living    BADL Assessment/Intervention  lower body dressing;grooming  -     Row Name 02/05/21 0856          Lower Body Dressing Assessment/Training    [place] Level (Lower Body Dressing)  don;shoes/slippers;socks;set up;supervision  -LUBA     Position (Lower Body Dressing)  unsupported sitting  -LUBA     Comment (Lower Body Dressing)  extra time and effort, appears with some processing and coordination deficit mild.  -     Row Name 02/05/21 0856          Grooming Assessment/Training    [place] Level (Grooming)  hair care, combing/brushing;wash face, hands;verbal cues;standby assist  -LUBA     Position (Grooming)  sink side  -LUBA     Comment (Grooming)  pt. needed cues to brush all areas, able to grasp large handle brush.  Pt. cued to wash dried tooth paste off mouth, pt. trying to just use dry washcloth, cues for pt. to wet wash cloth.  -     Row Name 02/05/21 0856          Upper Body Dressing Assessment/Training    [place] Level (Upper Body Dressing)  don;pajama/robe;maximum assist (25% patient effort)  -LUBA     Position (Upper Body Dressing)  edge of bed sitting  -LUBA      Row Name 02/05/21 0856          Self-Feeding Assessment/Training    [place] Level (Feeding)  independent;scoop food and bring to mouth  -     Assistive Devices (Feeding)  built-up handle utensils  -     Position (Self-Feeding)  supported sitting  -     Comment (Feeding)  Pt. trying to eat cream of wheat with fork.  Cues for pt. to switch to spoon.  Pt. with limited coordination to fully get utensils in mouth and only eats food off end of utensil.  -       User Key  (r) = Recorded By, (t) = Taken By, (c) = Cosigned By    Initials Name Provider Type    Maritza Tomlinson, OT Occupational Therapist        Obj/Interventions     Row Name 02/05/21 0902          Vision Assessment/Intervention    Visual Impairment/Limitations  -- pt. had difficulty locating call bell and nurse aid reported same, pt. was able to locate items on sink.  -     Row Name 02/05/21 0902          Shoulder (Therapeutic Exercise)    Shoulder (Therapeutic Exercise)  AROM (active range of motion)  -     Shoulder AROM (Therapeutic Exercise)  bilateral;flexion;extension;aBduction;aDduction;horizontal aBduction/aDduction;10 repetitions;sitting  -     Row Name 02/05/21 0902          Elbow/Forearm (Therapeutic Exercise)    Elbow/Forearm (Therapeutic Exercise)  strengthening exercise  -     Elbow/Forearm Strengthening (Therapeutic Exercise)  bilateral;flexion;extension;10 repetitions;sitting pt. tolerated min manual resistance, noted L push and pull stronger on L than R although pt. reports she is R handed.  -     Row Name 02/05/21 0902          Wrist (Therapeutic Exercise)    Wrist (Therapeutic Exercise)  AROM (active range of motion)  -     Wrist AROM (Therapeutic Exercise)  bilateral;flexion;extension;10 repetitions  -     Row Name 02/05/21 0902          Balance    Static Sitting Balance  unsupported;sitting, edge of bed;WFL  -     Dynamic Sitting Balance  WFL;unsupported;sitting, edge of bed;sitting in chair LBD tasks  -      Static Standing Balance  WFL;unsupported;standing  -LUBA     Dynamic Standing Balance  mild impairment;supported;standing  -[facility] Name 02/05/21 0902          Therapeutic Exercise    Therapeutic Exercise  shoulder;elbow/forearm;wrist  -LUBA       User Key  (r) = Recorded By, (t) = Taken By, (c) = Cosigned By    Initials Name Provider Type    Maritza Tomlinson, OT Occupational Therapist        Goals/Plan     [facility] Name 02/05/21 0908          Bed Mobility Goal 1 (OT)    Progress/Outcomes (Bed Mobility Goal 1, OT)  goal partially met met to EOB  -LUBA     Row Name 02/05/21 0908          Transfer Goal 1 (OT)    Progress/Outcome (Transfer Goal 1, OT)  goal partially met met stand from EOB and into chair  -[facility] Name 02/05/21 0908          Dressing Goal 1 (OT)    Progress/Outcome (Dressing Goal 1, OT)  goal partially met met SBA with socks and slippers  -[facility] Name 02/05/21 0908          Grooming Goal 1 (OT)    Progress/Outcome (Grooming Goal 1, OT)  goal partially met met brushing hair  -LUBA       User Key  (r) = Recorded By, (t) = Taken By, (c) = Cosigned By    Initials Name Provider Type    Maritza Tomlinson, OT Occupational Therapist        Clinical Impression     [facility] Name 02/05/21 0905          Pain Scale: Numbers Pre/Post-Treatment    Pain Location  head  -LUBA     Pain Intervention(s)  Repositioned  -LUBA     Row Name 02/05/21 0905          Pain Scale: FACES Pre/Post-Treatment    Pain: FACES Scale, Pretreatment  2-->hurts little bit  -LUBA     Posttreatment Pain Rating  2-->hurts little bit  -LUBA     Pre/Posttreatment Pain Comment  per pt. very mild DOLL only  -[facility] Name 02/05/21 0905          Plan of Care Review    Plan of Care Reviewed With  patient  -LUBA     Progress  improving  -LUBA     Outcome Summary  Pt. with increased independence with grooming progressing to combing hair SBA from mod A prior session.  Pt. only oriented to person and place today and several periods of confusion and problem solving  noted ie. eating cream of wheat with fork instead of spoon even though none staying on fork, with cues to switch to spoon. Pt. progressed to donning socks and slipper with mod A to SBA with extra time.  SBA OOB and CGA with mvmt in room.  Cont OT POC.  -LUBA     Row Name 02/05/21 0905          Vital Signs    Pre Systolic BP Rehab  120  -LUBA     Pre Treatment Diastolic BP  79  -LUBA     Post Systolic BP Rehab  102  -LUBA     Post Treatment Diastolic BP  66  -LUBA     Pretreatment Heart Rate (beats/min)  58  -LUBA     Posttreatment Heart Rate (beats/min)  84  -LUBA     Pre SpO2 (%)  98  -LUBA     O2 Delivery Pre Treatment  room air  -LUBA     Post SpO2 (%)  94  -LUBA     O2 Delivery Post Treatment  room air  -LUBA     Pre Patient Position  Supine  -LUBA     Intra Patient Position  Standing  -LUBA     Post Patient Position  Sitting  -LUBA     Row Name 02/05/21 0905          Positioning and Restraints    Pre-Treatment Position  in bed  -LUBA     Post Treatment Position  chair  -LUBA     In Chair  sitting;call light within reach;encouraged to call for assist;exit alarm on;waffle cushion  -LUBA       User Key  (r) = Recorded By, (t) = Taken By, (c) = Cosigned By    Initials Name Provider Type    Maritza Tomlinson, OT Occupational Therapist        Outcome Measures     Row Name 02/05/21 0909          How much help from another is currently needed...    Putting on and taking off regular lower body clothing?  3  -LUBA     Bathing (including washing, rinsing, and drying)  3  -LUBA     Toileting (which includes using toilet bed pan or urinal)  3  -LUBA     Putting on and taking off regular upper body clothing  2  -LUBA     Taking care of personal grooming (such as brushing teeth)  3  -LUBA     Eating meals  3  -LUBA     AM-PAC 6 Clicks Score (OT)  17  -LUBA     Row Name 02/05/21 0909          Modified Starr Scale    Pre-Stroke Modified Abby Scale  6 - Unable to determine (UTD) from the medical record documentation  -LUBA     Modified Starr Scale  3 - Moderate disability.   Requiring some help, but able to walk without assistance.  -LUBA     Row Name 02/05/21 0909          Functional Assessment    Outcome Measure Options  AM-PAC 6 Clicks Daily Activity (OT)  -LUBA       User Key  (r) = Recorded By, (t) = Taken By, (c) = Cosigned By    Initials Name Provider Type    Maritza Tomlinson OT Occupational Therapist        Occupational Therapy Education                 Title: PT OT SLP Therapies (In Progress)     Topic: Occupational Therapy (In Progress)     Point: ADL training (In Progress)     Description:   Instruct learner(s) on proper safety adaptation and remediation techniques during self care or transfers.   Instruct in proper use of assistive devices.              Learning Progress Summary           Patient Acceptance, E,D, NR by LUBA at 2/5/2021 0910    Comment: sequencing and problem solving ADL tasks, UE TE, re-orientation    Acceptance, E, NR by CL at 2/3/2021 1522    Acceptance, E, NR by CL at 2/2/2021 0942    Acceptance, E, NR by CL at 2/1/2021 1210    Acceptance, E, NR by CS at 1/31/2021 1154                   Point: Home exercise program (In Progress)     Description:   Instruct learner(s) on appropriate technique for monitoring, assisting and/or progressing therapeutic exercises/activities.              Learning Progress Summary           Patient Acceptance, E,D, NR by LUBA at 2/5/2021 0910    Comment: sequencing and problem solving ADL tasks, UE TE, re-orientation                   Point: Precautions (In Progress)     Description:   Instruct learner(s) on prescribed precautions during self-care and functional transfers.              Learning Progress Summary           Patient Acceptance, E, NR by CL at 2/3/2021 1522    Acceptance, E, NR by CL at 2/2/2021 0942    Acceptance, E, NR by CL at 2/1/2021 1210    Acceptance, E, NR by CS at 1/31/2021 1154    Acceptance, E, NR by CS at 1/30/2021 0927                   Point: Body mechanics (In Progress)     Description:   Instruct  learner(s) on proper positioning and spine alignment during self-care, functional mobility activities and/or exercises.              Learning Progress Summary           Patient Acceptance, E, NR by CL at 2/3/2021 1522    Acceptance, E, NR by CL at 2/2/2021 0942    Acceptance, E, NR by CL at 2/1/2021 1210    Acceptance, E, NR by CS at 1/31/2021 1154    Acceptance, E, NR by CS at 1/30/2021 0927                               User Key     Initials Effective Dates Name Provider Type Discipline    LUBA 06/08/18 -  Maritza Cheek, OT Occupational Therapist OT    CL 04/03/18 -  Jade Neely, OT Occupational Therapist OT    CS 10/21/20 -  Sudhir Ortega OT Occupational Therapist OT              OT Recommendation and Plan     Plan of Care Review  Plan of Care Reviewed With: patient  Progress: improving  Outcome Summary: Pt. with increased independence with grooming progressing to combing hair SBA from mod A prior session.  Pt. only oriented to person and place today and several periods of confusion and problem solving noted ie. eating cream of wheat with fork instead of spoon even though none staying on fork, with cues to switch to spoon. Pt. progressed to donning socks and slipper with mod A to SBA with extra time.  SBA OOB and CGA with mvmt in room.  Cont OT POC.     Time Calculation:   Time Calculation- OT     Row Name 02/05/21 0911             Time Calculation- OT    OT Start Time  0822  -LUBA      OT Received On  02/05/21  -LUBA      OT Goal Re-Cert Due Date  02/09/21  -LUBA         Timed Charges    60351 - OT Therapeutic Exercise Minutes  8  -LUBA      00824 - OT Therapeutic Activity Minutes  5  -LUBA      43897 - OT Self Care/Mgmt Minutes  15  -LUBA        User Key  (r) = Recorded By, (t) = Taken By, (c) = Cosigned By    Initials Name Provider Type    Maritza Tomlinson, OT Occupational Therapist        Therapy Charges for Today     Code Description Service Date Service Provider Modifiers Qty    69656746972  OT THER PROC EA 15  MIN 2/5/2021 Maritza Cheek, OT GO 1    83738933641 HC OT SELF CARE/MGMT/TRAIN EA 15 MIN 2/5/2021 Maritza Cheek, OT GO 1               Maritza Cheek, OT  2/5/2021

## 2021-02-05 NOTE — PROGRESS NOTES
Time: 20min  Patient Name: Arlene Calderon  MRN: [id number]  Admission date: 1/29/2021    Assessment date: 02/05/21 12:02 EST      Reason for visit: Length of stay    Additional information obtained: Patient to d/c today. Spoke with patient this am. States she is bored with meals. Pt did not know of what she would want as alternative food options. Pt stated she had not been consuming liquids d/t dislike of HTL. Agreeable to try orange magic cup for rest of meals during admission.     Current diet: Diet Dysphagia; III - Pureed With Some Mashed; Honey Thick; No Straws; Cardiac    Intervention:  Reviewed feeding plan  Labs reviewed  Follow treatment plan  Care plan reviewed  Provide Magic cup BID    Follow up:   Per protocol      Jihan Hernandez RD  12:02 EST

## 2021-02-05 NOTE — DISCHARGE SUMMARY
[facility] Medicine Services  DISCHARGE SUMMARY    Patient Name: Arlene Calderon  : 1965  MRN: [id number]    Date of Admission: 2021  8:31 PM  Date of Discharge:  2021  Primary Care Physician: Maggi Talamantes APRN    Consults     No orders found from 2020 to 2021.          Hospital Course     Presenting Problem:   ICH (intracerebral hemorrhage) (CMS/HCC) [I61.9]    Active Hospital Problems    Diagnosis  POA   • **Acute left frontal ICH 2021. Smaller right parieto-occipital ICH also present (CMS/HCC) [I61.9]  Yes   • PFO with right-to-left atrial level shunt on Valsalva [Q21.1]  Not Applicable   • Hypertension [I10]  Yes   • COVID-19 virus detected at admission [U07.1]  Yes      Resolved Hospital Problems    Diagnosis Date Resolved POA   • Leukocytosis [D72.829] 2021 Yes          Hospital Course:  Arlene Calderon is a 55 y.o. female  with a PMH of HTN, small PFO, previous smoker, who was admitted on  for ICH. Patient was initially seen at [facility] with left lateral gaze, confusion, and right sided weakness associated with hypotension and low grade temp. CTH showed large left frontal intraparenchymal hemorrhage with vasogenic edema and localized mass effect. Initial NIH 22 with GCS 4-3-6. ICH volume of 18, and ICH score of 1. She was then transferred to [facility] ICU on  for neurosurgery evaluation. They believed she had hemorrhagic conversion of acute CVA vs hypertensive hemorrhage. No surgical intervention was recommended. She was started on keppra for seizure ppx.  On  COVID-19 detected on PCR, but patient was asymptomatic other than low grade temperature of 100.1. She was asymptomatic during entire hospital stay and was not treated for COVID. She was transferred out of ICU on 2/3. She was still having slurred speech with some short term memory loss. Otherwise stable for discharge to  today.       Discharge Follow Up Recommendations for outpatient  labs/diagnostics:  Follow up with PCP for blood pressure management   Follow up with Dr. Villa in 6-8 weeks for repeat MRI w/ and w/o contrast  Continue speech eval     COVID Symptom Date of Onset: asymptomatic   Date of first positive COVID test: 1/30/2021   Quarantine Complete 10 days after date of first positive test in asymptomatic patient: 2/9/2021      Day of Discharge     HPI:   No acute events overnight. Alert and oriented x4. Has some short term memory loss, unsure when her last BM was. Denies new headaches or changes in vision or weakness in extremities.     Review of Systems  Gen- No fevers, chills  CV- No chest pain, palpitations  Resp- No cough, dyspnea  GI- No N/V/D, abd pain    Vital Signs:   Temp:  [97 °F (36.1 °C)-97.8 °F (36.6 °C)] 97 °F (36.1 °C)  Heart Rate:  [59-88] 59  Resp:  [16] 16  BP: (113-145)/(74-80) 120/79     Physical Exam:  With patient's consent, physical exam was conducted via visual telemedicine encounter due to patient's current isolation requirements in the interest of PPE conservation.    Constitutional: No acute distress, awake, alert, nontoxic, normal body habitus  HENT: NCAT, MMM, no conjunctival injection  Respiratory: Good effort, nonlabored respirations   Cardiovascular:  tele with NSR  Musculoskeletal: No edema, normal muscle tone and mass for age  Psychiatric: Appropriate affect, good insight and judgement, cooperative  Neurologic: Oriented x 3, movements symmetric BUE and BLE, slurred speech  Skin: No visible rashes, no jaundice seen on exposed skin through window      Pertinent  and/or Most Recent Results     LAB RESULTS:      Lab 02/04/21  0842 02/03/21  0935 02/02/21  0603 02/01/21  0759 02/01/21  0502 01/31/21  0609 01/29/21  2105 01/29/21  1811   WBC 8.96 9.60 9.94  --  16.26* 13.86*  --  13.92*   HEMOGLOBIN 17.0* 16.3* 16.0*  --  14.4 15.9  --  15.3   HEMATOCRIT 51.8* 49.6* 48.2*  --  44.0 49.0*  --  45.1   PLATELETS 274 298 293  --  290 329  --  359   NEUTROS ABS  4.38 5.32 5.23  --  12.48* 10.90*  --  10.27*   IMMATURE GRANS (ABS) 0.05 0.07* 0.06*  --  0.13* 0.14*  --  0.09*   LYMPHS ABS 3.51* 3.17* 3.66*  --  2.51 2.30  --  2.78   MONOS ABS 0.74 0.86 0.91*  --  1.12* 0.50  --  0.72   EOS ABS 0.24 0.16 0.06  --  0.00 0.00  --  0.02   MCV 90.1 89.7 88.3  --  89.2 90.7  --  87.1   CRP  --   --   --  <0.30  --   --   --   --    PROCALCITONIN  --   --   --   --  0.03  --   --   --    LDH  --   --   --   --  251*  --   --   --    PROTIME  --   --   --   --   --   --  14.2* 13.2   APTT  --   --   --   --   --   --  29.2 30.5   D DIMER QUANT  --   --   --  1.18*  --   --   --   --          Lab 02/04/21  0842 02/03/21  0935 02/02/21  0603 02/01/21  0502 01/31/21  0609 01/30/21  0437  01/29/21  1811   SODIUM 143 142 139 138 139  --   --  143   POTASSIUM 4.3 4.1 3.6 4.2 3.7 3.9   < > 4.4   CHLORIDE 105 105 104 106 106  --   --  105   CO2 32.0* 29.0 26.0 23.0 24.0  --   --  26.6   ANION GAP 6.0 8.0 9.0 9.0 9.0  --   --  11.4   BUN 16 19 18 21* 23*  --   --  9   CREATININE 0.70 0.78 0.58 0.63 0.57  --   --  0.67   GLUCOSE 101* 101* 80 93 131*  --   --  126*   CALCIUM 9.6 9.1 8.6 8.7 9.4  --   --  9.7   MAGNESIUM  --   --  2.2 2.3 2.4 2.5  --  2.3   PHOSPHORUS  --   --  3.0 3.3 3.2  --   --   --    HEMOGLOBIN A1C  --   --   --   --   --  6.00*  --   --     < > = values in this interval not displayed.         Lab 02/02/21  0603 02/01/21  0502 01/31/21  0609 01/29/21  1811   TOTAL PROTEIN  --   --   --  7.7   ALBUMIN 3.70 3.50 4.00 4.10   GLOBULIN  --   --   --  3.6   ALT (SGPT)  --   --   --  17   AST (SGOT)  --   --   --  19   BILIRUBIN  --   --   --  0.4   ALK PHOS  --   --   --  101         Lab 01/29/21  2105 01/29/21  1811   TROPONIN T  --  <0.010   PROTIME 14.2* 13.2   INR 1.12 0.97         Lab 01/30/21  0437   CHOLESTEROL 210*   LDL CHOL 138*   HDL CHOL 58   TRIGLYCERIDES 76         Lab 02/01/21  0502   FERRITIN 245.30*         Brief Urine Lab Results     None        Microbiology  Results (last 10 days)     Procedure Component Value - Date/Time    COVID PRE-OP / PRE-PROCEDURE SCREENING ORDER (NO ISOLATION) - Swab, Nasopharynx [[id number]]  (Abnormal) Collected: 01/30/21 0242    Lab Status: Final result Specimen: Swab from Nasopharynx Updated: 01/30/21 0749    Narrative:      The following orders were created for panel order COVID PRE-OP / PRE-PROCEDURE SCREENING ORDER (NO ISOLATION) - Swab, Nasopharynx.  Procedure                               Abnormality         Status                     ---------                               -----------         ------                     COVID-19,CEPHEID,AARON IN-...[[id number]]  Abnormal            Final result                 Please view results for these tests on the individual orders.    COVID-19,CEPHEID,AARON IN-HOUSE(OR EMERGENT/ADD-ON),NP SWAB IN TRANSPORT MEDIA 3-4 HR TAT - Swab, Nasopharynx [[id number]]  (Abnormal) Collected: 01/30/21 0242    Lab Status: Final result Specimen: Swab from Nasopharynx Updated: 01/30/21 0749     COVID19 Detected    Narrative:      Fact sheet for providers: https://www.fda.gov/media/264750/download     Fact sheet for patients: https://www.fda.gov/media/843379/download          Ct Abdomen Without Contrast    Result Date: 1/30/2021  EXAMINATION: CT CHEST WO CONTRAST-, CT ABDOMEN WO CONTRAST-  INDICATION: STAGING CANCER POSSIBLE BRAIN METS; Z74.09-Other reduced mobility  TECHNIQUE: Multiplanar CT imaging of the chest abdomen and pelvis was performed without administration of intravenous contrast  The radiation dose reduction device was turned on for each scan per the ALARA (As Low as Reasonably Achievable) protocol.  COMPARISON: NONE  FINDINGS:  CHEST: Homogenous attenuation of the thyroid. Aortic atherosclerosis. Coronary artery calcifications. Main pulmonary arteries normal in caliber. Heart is normal in size. No pericardial effusion. No grossly enlarged mediastinal or hilar lymph nodes however exam is limited in the absence  of intravenous contrast. No pleural effusion. Central airways are patent. Some focal areas of scarring are seen in the right lung base. No acute osseous findings in the chest.  Abdomen/pelvis: Homogenous attenuation of the liver. The gallbladder is distended with high density material likely representing excreted contrast from prior exams. No intra or extrahepatic biliary dilation. Pancreas is normal. Spleen is normal in size. Adrenal glands are normal. Kidneys are normal. No hydronephrosis. Limited evaluation of the pelvis. Soft tissue density within the right pelvis is presumed to be the uterus. Colon is normal in caliber. Visualized loops of small bowel are normal in caliber. The appendix is not well visualized. Postsurgical changes of the stomach with mild thickening of the peripyloric region. Extensive aortic atherosclerosis with some focal infrarenal ectasia. No adenopathy. Multilevel degenerative changes throughout the lumbar spine.        1. No findings to suggest primary neoplasm within the chest or abdomen. Somewhat limited evaluation the absence of intravenous contrast.  2. Limited evaluation of the pelvis. Soft tissue structure seen along the posterior margin of the bladder is presumed to be enlarged fibroid uterus but is incompletely visualized.   This report was finalized on 1/30/2021 1:46 PM by Abdoulaye Foreman.      Ct Head Without Contrast    Addendum Date: 1/31/2021    There is an additional 5 x 5 x 5 mm rounded focus of hemorrhage in the right frontoparietal vertex (series 4 image 24). This was communicated with Raf Jiménez with neurosurgery at 0658 on 1/31/2021.  This report was finalized on 1/31/2021 6:59 AM by Abdoulaye Foreman.      Result Date: 1/31/2021  EXAMINATION: CT HEAD WO CONTRAST-  INDICATION: Cerebral hemorrhage suspected; Z74.09-Other reduced mobility  TECHNIQUE: Multiplanar CT imaging of the brain was performed without administration of intravenous contrast.  The radiation dose reduction  device was turned on for each scan per the ALARA (As Low as Reasonably Achievable) protocol.  COMPARISON: CTA head and neck performed 1/29/2021  FINDINGS: ICH Volume is: 114.9 cubic centimeters  Intraventricular Hemorrhage: No  Infratentorial Origin of Hemorrhage: No  Intraparenchymal hemorrhage centered in the left frontal lobe is redemonstrated and appears slightly progressed compared to prior. Surrounding edema is redemonstrated. Motion artifact somewhat obscures osseous evaluation. No gross osseous abnormality. No midline shift. Ventricles are patent. Basilar cisterns are patent. No other area of hemorrhage.          Left frontal lobe intraparenchymal hemorrhage which appears slightly increased in size compared to CTA of the head and neck performed 1/29/2021. No midline shift or mass effect on the adjacent ventricles. Surrounding edema appears unchanged.   This report was finalized on 1/30/2021 1:36 PM by Abdoulaye Foreman.      Ct Angiogram Neck    Result Date: 1/29/2021  CTA Head, CTA Neck INDICATION: Stroke follow-up, head bleed TECHNIQUE: CT angiogram of the head and neck with IV contrast. 3-D reconstructions were obtained and reviewed.  Evaluation for a significant carotid arterial stenosis is based on the NASCET criteria. Radiation dose reduction techniques included automated exposure control or exposure modulation based on body size. Count of known CT and cardiac nuc med studies performed in previous 12 months: 0. COMPARISON: None available. FINDINGS: CTA neck:  Lung apices are clear. No definite abnormality identified involving the arch origin vessels. There is about 20% stenosis involving the left common carotid artery. 50% stenosis is seen involving the proximal right internal carotid artery secondary to the calcific plaque. There is about 20% stenosis on the left. Vertebrals are unremarkable. CTA head:  Area of hemorrhage is seen in the patient's left posterior frontal lobe with surrounding edema. There are  bilateral calcifications involving the carotid siphons. Anterior cerebrals, middle cerebrals and posterior cerebrals are unremarkable. There is calcification with narrowing seen involving the intracranial left vertebral artery.     1. No significant vascular abnormality is identified involving the vessels of the neck or the [place]. There is noncritical cervical internal carotid artery stenosis, greater on the right. 2. Area of lobar hemorrhage is seen in the patient's left posterior frontal lobe with surrounding edema. Signer Name: Gris Reynolds MD  Signed: 1/29/2021 9:13 PM  Workstation Name: [id number]  [facility]    Ct Chest Without Contrast Diagnostic    Result Date: 1/30/2021  EXAMINATION: CT CHEST WO CONTRAST-, CT ABDOMEN WO CONTRAST-  INDICATION: STAGING CANCER POSSIBLE BRAIN METS; Z74.09-Other reduced mobility  TECHNIQUE: Multiplanar CT imaging of the chest abdomen and pelvis was performed without administration of intravenous contrast  The radiation dose reduction device was turned on for each scan per the ALARA (As Low as Reasonably Achievable) protocol.  COMPARISON: NONE  FINDINGS:  CHEST: Homogenous attenuation of the thyroid. Aortic atherosclerosis. Coronary artery calcifications. Main pulmonary arteries normal in caliber. Heart is normal in size. No pericardial effusion. No grossly enlarged mediastinal or hilar lymph nodes however exam is limited in the absence of intravenous contrast. No pleural effusion. Central airways are patent. Some focal areas of scarring are seen in the right lung base. No acute osseous findings in the chest.  Abdomen/pelvis: Homogenous attenuation of the liver. The gallbladder is distended with high density material likely representing excreted contrast from prior exams. No intra or extrahepatic biliary dilation. Pancreas is normal. Spleen is normal in size. Adrenal glands are normal. Kidneys are normal. No hydronephrosis. Limited evaluation of  the pelvis. Soft tissue density within the right pelvis is presumed to be the uterus. Colon is normal in caliber. Visualized loops of small bowel are normal in caliber. The appendix is not well visualized. Postsurgical changes of the stomach with mild thickening of the peripyloric region. Extensive aortic atherosclerosis with some focal infrarenal ectasia. No adenopathy. Multilevel degenerative changes throughout the lumbar spine.        1. No findings to suggest primary neoplasm within the chest or abdomen. Somewhat limited evaluation the absence of intravenous contrast.  2. Limited evaluation of the pelvis. Soft tissue structure seen along the posterior margin of the bladder is presumed to be enlarged fibroid uterus but is incompletely visualized.   This report was finalized on 1/30/2021 1:46 PM by Abdoulaye Foreman.      Mri Brain With & Without Contrast    Result Date: 1/31/2021  EXAMINATION: MRI BRAIN W WO CONTRAST-  INDICATION: ICH; Z74.09-Other reduced mobility  TECHNIQUE: Multiplanar MR imaging of the brain was performed with and without intravenous contrast.  COMPARISON: CT head performed one day prior  FINDINGS: No acute infarct. Redemonstration of the large left frontal lobe hemorrhage which demonstrates a rim of restricted diffusion and intrinsic T1 shortening consistent with blood products. Marked susceptibility artifact. Surrounding FLAIR hyperintensity consistent with mild edema. There is no mass effect or midline shift. There is adjacent FLAIR hyperintensity extending within the parietal occipital lobe sulci on the left. Some scattered subcortical matter changes are also noted most consistent with chronic small vessel ischemic changes.  There is an additional rounded focus of susceptibility artifact measuring approximately 5 mm in the right parieto-occipital region, initially thought to be within the frontoparietal region on initial CT head. This corresponds with a rim of FLAIR enhancement with some mild  adjacent edema seen within the right occipital region. This lesion does not demonstrate convincing contrast enhancement.  No suspicious soft tissue findings. Osseous structures are normal.      Left frontal lobe hematoma without obvious contrast enhancement or solid component. Additional small focus of hematoma in the right parieto-occipital region without suspicious enhancement or solid component. There is some mild surrounding edema with both of these regions however no gross mass effect.  This report was finalized on 1/31/2021 7:15 AM by Abdoulaye Foreman.      Fl Video Swallow With Speech Single Contrast    Result Date: 2/2/2021  EXAMINATION: FL VIDEO SWALLOW W SPEECH SINGLE-CONTRAST-  INDICATION: dysphagia; Z74.09-Other reduced mobility; R13.10-Dysphagia, unspecified; R41.841-Cognitive communication deficit  TECHNIQUE: 1 minute and 48 seconds of fluoroscopic time was used for this exam. 1 associated image was saved. The patient was evaluated in the seated lateral position while taking a variety of consistencies of barium by mouth under the direction of speech pathology.  COMPARISON: NONE  FINDINGS: 1 minute and 48 seconds of fluoroscopy provided for a modified barium swallow. Please see speech therapy report for full details and recommendations.       Fluoroscopy provided for a modified barium swallow. Please see speech therapy report for full details and recommendations.    This report was finalized on 2/2/2021 10:51 AM by Dr. Philip Dey.      Xr Chest 1 View    Result Date: 2/1/2021  EXAMINATION: XR CHEST 1 VW-  INDICATION: Positive Covid PCR 1/29/2021; Z74.09-Other reduced mobility; R13.10-Dysphagia, unspecified; R41.841-Cognitive communication deficit  COMPARISON: NONE  FINDINGS: No focal airspace opacity. No pleural effusion or pneumothorax. Normal heart and mediastinal contours.      No evidence of acute disease in the chest.  This report was finalized on 2/1/2021 3:43 PM by Gutierrez Kapoor.      Xr Chest 1  View    Result Date: 1/30/2021  PROCEDURE: XR CHEST 1 VW-  HISTORY: Acute Stroke Protocol (onset < 12 hrs)  COMPARISON: None.  FINDINGS: Atherosclerosis is noted. The heart is normal in size. The mediastinum is unremarkable. The lungs are clear. There is no pneumothorax.  There are no acute osseous abnormalities.      No acute cardiopulmonary process.  Continued followup is recommended.  This report was finalized on 1/30/2021 7:41 AM by Dayton Mays DO.    Mri Angiogram Venogram Head    Result Date: 1/31/2021  EXAMINATION: MRI ANGIOGRAM VENOGRAM HEAD-  INDICATION: Dural venous sinus thrombosis suspected; Z74.09-Other reduced mobility  TECHNIQUE: Sagittal MR venogram was performed with 3-D reconstructions.  COMPARISON: CT head performed one day prior  FINDINGS: Patient motion limits exam. The superior sagittal sinus appears grossly patent. Transverse sinuses, sigmoid sinuses and jugular veins are patent. Inferior sagittal sinus is faintly visualized. Cerebral veins and straight sinus are patent.      Somewhat limited exam due to motion. Inferior sagittal sinus is attenuated and only faintly visualized which may be sequela of motion artifact. Remaining sinuses are patent.  This report was finalized on 1/31/2021 6:48 AM by Abdoulaye Foreman.      Ct Angiogram Head    Result Date: 1/29/2021  CTA Head, CTA Neck INDICATION: Stroke follow-up, head bleed TECHNIQUE: CT angiogram of the head and neck with IV contrast. 3-D reconstructions were obtained and reviewed.  Evaluation for a significant carotid arterial stenosis is based on the NASCET criteria. Radiation dose reduction techniques included automated exposure control or exposure modulation based on body size. Count of known CT and cardiac nuc med studies performed in previous 12 months: 0. COMPARISON: None available. FINDINGS: CTA neck:  Lung apices are clear. No definite abnormality identified involving the arch origin vessels. There is about 20% stenosis involving the left  common carotid artery. 50% stenosis is seen involving the proximal right internal carotid artery secondary to the calcific plaque. There is about 20% stenosis on the left. Vertebrals are unremarkable. CTA head:  Area of hemorrhage is seen in the patient's left posterior frontal lobe with surrounding edema. There are bilateral calcifications involving the carotid siphons. Anterior cerebrals, middle cerebrals and posterior cerebrals are unremarkable. There is calcification with narrowing seen involving the intracranial left vertebral artery.     1. No significant vascular abnormality is identified involving the vessels of the neck or the [place]. There is noncritical cervical internal carotid artery stenosis, greater on the right. 2. Area of lobar hemorrhage is seen in the patient's left posterior frontal lobe with surrounding edema. Signer Name: Gris Reynolds MD  Signed: 1/29/2021 9:13 PM  Workstation Name: [id number]  [facility]    Ct Head Without Contrast Stroke Protocol    Result Date: 1/29/2021  FINAL REPORT TECHNIQUE: Multiple axial CT images were performed from the foramen magnum to the vertex. This study was performed with techniques to keep radiation doses as low as reasonably achievable (ALARA). Individualized dose reduction techniques using automated exposure control or adjustment of mA and/or kV according to the patient's size were employed. CLINICAL HISTORY: Stroke, follow up FINDINGS: There is mild generalized cerebral atrophy.  There is decreased attenuation in the white matter, consistent with mild small vessel chronic ischemic change.  The ventricles are enlarged.   There is a left frontal intraparenchymal hematoma measuring 3.2 x 1.8 x 2.9 cm with surrounding vasogenic edema.  There is localized mass effect without significant midline shift.   No masses are identified.  No extra-axial fluid is seen.  The paranasal sinuses are unremarkable.     Large left frontal  intraparenchymal hematoma with surrounding vasogenic edema and localized mass effect.  No significant midline shift. Authenticated by Luis Partida MD on 01/29/2021 06:45:07 PM      Results for orders placed during the hospital encounter of 01/29/21   Duplex Venous Lower Extremity - Bilateral CAR    Narrative · Normal bilateral lower extremity venous duplex scan.          Results for orders placed during the hospital encounter of 01/29/21   Duplex Venous Lower Extremity - Bilateral CAR    Narrative · Normal bilateral lower extremity venous duplex scan.          Results for orders placed during the hospital encounter of 01/29/21   Adult Transthoracic Echo Complete W/ Cont if Necessary Per Protocol    Narrative · Saline test results are positive with valsalva manuever for right to   left atrial level shunt.  · Estimated left ventricular EF = 60% Estimated left ventricular EF was in   agreement with the calculated left ventricular EF. Left ventricular   ejection fraction appears to be 56 - 60%. Left ventricular systolic   function is normal.  · Left ventricular diastolic function was normal.  · Normal right ventricular cavity size, wall thickness, systolic function   and septal motion noted.  · No evidence of pulmonary hypertension is present.  · There is no evidence of pericardial effusion.  · No significant structural or functional valvular abnormality   demonstrated.  · Small patent foramen ovale present.          Plan for Follow-up of Pending Labs/Results:     Discharge Details        Discharge Medications      New Medications      Instructions Start Date   amLODIPine 10 MG tablet  Commonly known as: NORVASC   10 mg, Oral, Every 24 Hours Scheduled   Start Date: February 6, 2021     famotidine 20 MG tablet  Commonly known as: PEPCID   20 mg, Oral, 2 Times Daily Before Meals      hydrALAZINE 25 MG tablet  Commonly known as: APRESOLINE   25 mg, Oral, Every 8 Hours PRN      levETIRAcetam 500 MG tablet  Commonly known  as: KEPPRA   500 mg, Oral, 2 Times Daily             No Known Allergies      Discharge Disposition:  Skilled Nursing Facility (DC - External)    Diet:  Hospital:  Diet Order   Procedures   • Diet Dysphagia; III - Pureed With Some Mashed; Honey Thick; No Straws; Cardiac       CODE STATUS:    Code Status and Medical Interventions:   Ordered at: 01/29/21 2054     Level Of Support Discussed With:    Patient     Code Status:    CPR     Medical Interventions (Level of Support Prior to Arrest):    Full       No future appointments.    Additional Instructions for the Follow-ups that You Need to Schedule     Discharge Follow-up with PCP   As directed       Currently Documented PCP:    Maggi Talamantes APRN    PCP Phone Number:    [phone]     Follow Up Details: in 2-4 weeks for blood pressure management         Discharge Follow-up with Specified Provider: Dr. Villa; 6 Weeks   As directed      To: Dr. Villa    Follow Up: 6 Weeks    Follow Up Details: needs repeat MRI w/ and w/o contrast                     Idania Goel MD  02/05/21      Time Spent on Discharge:  I spent  34  minutes on this discharge activity which included: face-to-face encounter with the patient, reviewing the data in the system, coordination of the care with the nursing staff as well as consultants, documentation, and entering orders.

## 2021-02-06 NOTE — PLAN OF CARE
Goal Outcome Evaluation:      VSS on room air. Patient transferring to [facility] for rehab.

## 2021-02-26 ENCOUNTER — TRANSCRIBE ORDERS (OUTPATIENT)
Dept: ADMINISTRATIVE | Facility: HOSPITAL | Age: 56
End: 2021-02-26

## 2021-02-26 DIAGNOSIS — Z12.31 VISIT FOR SCREENING MAMMOGRAM: Primary | ICD-10-CM

## 2021-03-23 ENCOUNTER — OFFICE VISIT (OUTPATIENT)
Dept: NEUROLOGY | Facility: CLINIC | Age: 56
End: 2021-03-23

## 2021-03-23 VITALS
TEMPERATURE: 97.5 F | DIASTOLIC BLOOD PRESSURE: 66 MMHG | HEIGHT: 61 IN | WEIGHT: 142 LBS | BODY MASS INDEX: 26.81 KG/M2 | OXYGEN SATURATION: 96 % | HEART RATE: 68 BPM | SYSTOLIC BLOOD PRESSURE: 100 MMHG

## 2021-03-23 DIAGNOSIS — I61.6 NONTRAUMATIC MULTIPLE LOCALIZED INTRACEREBRAL HEMORRHAGES, UNSPECIFIED LATERALITY (HCC): ICD-10-CM

## 2021-03-23 DIAGNOSIS — Q21.12 PFO (PATENT FORAMEN OVALE): Primary | ICD-10-CM

## 2021-03-23 DIAGNOSIS — I69.321: ICD-10-CM

## 2021-03-23 PROCEDURE — 99244 OFF/OP CNSLTJ NEW/EST MOD 40: CPT | Performed by: NURSE PRACTITIONER

## 2021-03-23 RX ORDER — AMLODIPINE BESYLATE 5 MG/1
5 TABLET ORAL DAILY
COMMUNITY
Start: 2021-03-09

## 2021-03-23 RX ORDER — DIPHENOXYLATE HYDROCHLORIDE AND ATROPINE SULFATE 2.5; .025 MG/1; MG/1
TABLET ORAL DAILY
COMMUNITY

## 2021-03-23 RX ORDER — ERGOCALCIFEROL 1.25 MG/1
50000 CAPSULE ORAL WEEKLY
COMMUNITY

## 2021-03-23 NOTE — PATIENT INSTRUCTIONS
"I have placed a referral for outpatient speech therapy to help with your speech and reading.     During your hospital stay, they found a small hole in your heart. Many people have this condition, it may not require treatment. I would like you to see a cardiologist, Dr. Summers, to evalaute this further.    PLEASE keep your appointment with Dr. Villa (neurosurgery). On 3/29/2021, you have an appointment for the MRI at 1:15 then Dr. Villa at 3:30pm. The repeat scans will help us understand better WHY you had the bleed, and determine the best treatments moving forward.    Congratulations on quitting smoking! This is the most important step to protect every aspect of your health.     Keep taking all your medicines as ordered.    Patent Foramen Ovale, Adult    A foramen ovale is a hole between the upper chambers (right atrium and left atrium) of the heart. Before you are born, it is normal to have this hole in your heart. The hole allows blood to circulate through the body without having to go through the lungs. After your birth, when you are able to breathe, you do not need the foramen ovale and it usually closes. If the hole does not close, it is called a patent foramen ovale (PFO).  PFO is a common condition. Most people do not know they have this hole and they do not have any health problems caused by it.  What are the causes?  The cause of this condition is not known.  What increases the risk?  You are more likely to develop this condition if:  · You have a family history of PFO.  · You also have other heart disease that is present at birth (congenital heart disease).  What are the signs or symptoms?  In most cases, there are no symptoms of this condition. Possible rare symptoms include:  · Stroke caused by a blood clot.  · Transient ischemic attack (TIA). This is a \"warning stroke\" that causes stroke-like symptoms that go away quickly.  · Migraine headaches.  How is this diagnosed?  This condition may be diagnosed " "based on:  · A physical exam and your medical history.  · Echocardiogram. This test uses sound waves to produce images of the heart.  · Transesophageal echocardiogram (SHAHLA). This type of echocardiogram is performed by placing a probe in the part of the body that moves food from the mouth to the stomach (esophagus).  · Electrocardiogram (ECG). This test identifies changes in the electrical activity of the heart.  · Cardiac MRI. This is an imaging technique that is used to visualize the heart, if further images are needed after SHAHLA.  How is this treated?  Usually, no treatment is needed. If your condition is associated with symptoms or blood clots, you may need:  · Medicines to prevent blood clots and strokes (anticoagulant or antiplatelet medicines).  · A surgical procedure to close the hole (transcatheter closure).  Follow these instructions at home:  · Take over-the-counter and prescription medicines only as told by your health care provider.  · Keep all follow-up visits as told by your health care provider. This is important.  Contact a health care provider if:  · You have a fever.  · You have frequent or severe headaches.  Get help right away if:  · Your skin turns blue.  · You have chest pain or difficulty breathing.  · You have any symptoms of stroke. \"BE FAST\" is an easy way to remember the main warning signs of stroke:  ? B - Balance. Signs are dizziness, sudden trouble walking, or loss of balance.  ? E - Eyes. Signs are trouble seeing or a sudden change in vision.  ? F - Face. Signs are sudden weakness or numbness of the face, or the face or eyelid drooping on one side.  ? A - Arms. Signs are weakness or numbness in an arm. This happens suddenly and usually on one side of the body.  ? S - Speech. Signs are sudden trouble speaking, slurred speech, or trouble understanding what people say.  ? T - Time. Time to call emergency services. Write down what time symptoms started.  · You have other signs of stroke, " which may include:  ? A sudden, severe headache with no known cause.  ? Nausea or vomiting.  ? Seizure.  These symptoms may represent a serious problem that is an emergency. Do not wait to see if the symptoms will go away. Get medical help right away. Call your local emergency services (911 in the [place] Do not drive yourself to the hospital.  Summary  · A patent foramen ovale is a hole between the upper chambers (right atrium and left atrium) of your heart. The cause of this condition is not known.  · You may not know that you have a hole in your heart, and you may not have any health problems associated with it.  · Usually, no treatment is needed for this condition unless it is associated with symptoms or blood clots.  This information is not intended to replace advice given to you by your health care provider. Make sure you discuss any questions you have with your health care provider.  Document Revised: 10/22/2020 Document Reviewed: 02/27/2018  Elsevier Patient Education © 2021 [employer]    Speech-Language Therapy After a Stroke  You may have many physical changes after a stroke, and some of them may affect your ability to communicate. You may have problems talking, putting your thoughts into words, or using and understanding words. Speech-language therapy is a common treatment after a stroke.  How are speech and language affected by a stroke?  A stroke can damage your brain and nervous system. In most people, the left side of the brain controls language and speech, so damage to that area can affect those functions. You may have problems with:  · Understanding spoken or written words.  · Sharing your thoughts by talking. You may have trouble:  ? Speaking clearly.  ? Saying what you mean to say.  What is aphasia?  Aphasia is a condition that affects your ability to communicate with others. A stroke often causes aphasia because of damage to the left side of the brain. This is typically the side that controls  the ability to talk and understand language. Symptoms of aphasia include:  · Struggling to think of words and names of people, places, and objects.  · Using the wrong words while talking.  · Making up new words (without knowing it) that do not make sense to other people.  · Problems putting words together into a sentence.  · Difficulty understanding others while they talk.  · Problems with listening, reading, writing, using numbers, or doing math.  How can therapy help?  Speech-language therapy is a common treatment during stroke recovery. It may include doing communication activities, exercising your speech muscles, and practicing speech. It may help you:  · Learn to talk and communicate again.  · Have conversations.  · Use the right words to express ideas.  · Put words together into sentences.  · Learn to speak more clearly so others can understand you.  · Use gestures, sign language, symbols, or other methods to express yourself (aided communication). These can be used in place of speech or to add to what you are able to say. Some aided communication may involve use of electronic devices.  When will therapy start and where will I have therapy?  Your health care provider will decide when it is best for you to start therapy. Some people start rehabilitation, including speech-language therapy, as soon as they are medically stable. This may be 24-48 hours after a stroke.  Rehabilitation can take place in a few different places, based on your needs. It may take place in:  · The hospital or an in-patient rehabilitation hospital.  · An outpatient rehabilitation facility.  · A long-term care facility.  · A community rehabilitation clinic.  · Your home.  How can my family and friends support my recovery?  Your family and friends can help by:  · Being open about your problems.  · Creating or modifying daily routines to make talking and communicating easier.  · Lowering background noise.  · Being patient when you are trying  "to express your thoughts or understand other people.  · Getting your attention before talking to you.  · Using short and simple sentences and giving you extra time to talk or to respond to questions.  · Talking to you in a normal voice.  · Keeping eye contact with you during conversation.  · Paying attention to your body language.  · Using pictures, written words, or symbols to help you understand.  · Helping you to use aided communication.  · Asking \"yes\" or \"no\" questions.  · Praising your speech and progress.  Summary  · Since a stroke results from damage to your brain and nervous system, it can affect your speech and ability to use and understand language.  · Aphasia is a condition that affects your ability to communicate with others. A stroke often causes aphasia because of damage to the left side of the brain.  · Speech-language therapy is a common treatment after a stroke.  · Your family and friends can help by being open about your problems, giving you time to form words and sentences, and speaking in short, simple sentences.  This information is not intended to replace advice given to you by your health care provider. Make sure you discuss any questions you have with your health care provider.  Document Revised: 09/11/2020 Document Reviewed: 04/06/2018  Elsevier Patient Education © 2021 [employer]    Warning Signs of a Stroke    A stroke is a medical emergency and should be treated right away--every second counts. A stroke is caused by a decrease or block in blood flow to the brain. When this occurs, certain areas of the brain do not get enough oxygen, and brain cells begin to die.  A stroke can lead to brain damage and can sometimes be life-threatening. However, if someone having a stroke gets medical treatment right away, he or she has better chances of surviving and recovering from the stroke. Being able to recognize the symptoms of a stroke is very important.  Types of strokes  There are two main " "types of strokes:  · Ischemic strokes. This is the most common type of stroke. These strokes happen when a blood vessel that supplies blood to the brain is being blocked.  · Hemorrhagic strokes. These strokes result from bleeding in the brain due to a blood vessel leaking or bursting (rupturing).  A transient ischemic attack (TIA) is a \"warning stroke\" that causes stroke-like symptoms that go away quickly. Unlike a stroke, a TIA does not cause permanent damage to the brain. However, the symptoms of a TIA are the same as a stroke, and they also require medical treatment right away. Having a TIA is a sign that you are at higher risk for a permanent stroke.  Warning signs of a stroke  The symptoms of stroke may vary and will reflect the part of the brain that is involved. Symptoms usually happen suddenly. \"BE FAST\" is an easy way to remember the main warning signs of a stroke.  B - Balance  Signs are dizziness, sudden trouble walking, or loss of balance.  E - Eyes  Signs are trouble seeing or a sudden change in vision.  F - Face  Signs are sudden weakness or numbness of the face, or the face or eyelid drooping on one side.  A - Arms  Signs are weakness or numbness in an arm. This happens suddenly and usually on one side of the body.  S - Speech  Signs are sudden trouble speaking, slurred speech, or trouble understanding what people say.  T - Time  Time to call emergency services. Write down what time symptoms started.  Other signs of a stroke  Some less common signs of a stroke include:  · A sudden, severe headache with no known cause.  · Nausea or vomiting.  · Seizure.  A stroke may be happening even if only one \"BE FAST\" symptoms is present.  These symptoms may represent a serious problem that is an emergency. Do not wait to see if the symptoms will go away. Get medical help right away. Call your local emergency services (911 in the [place] Do not drive yourself to the hospital.  Summary  · A stroke is a medical " "emergency and should be treated right away--every second counts.  · \"BE FAST\" is an easy way to remember the main warning signs of a stroke.  · Call local emergency services right away if you or someone else has any stroke symptoms, even if the symptoms go away.  · Make note of what time the first symptoms appeared. Emergency responders or emergency room staff will need to know this information.  · Do not wait to see if symptoms will go away. Call 911 even if only one of the \"BE FAST\" symptoms appears.  This information is not intended to replace advice given to you by your health care provider. Make sure you discuss any questions you have with your health care provider.  Document Revised: 11/30/2018 Document Reviewed: 04/06/2018  Elsevier Patient Education © 2021 [employer]    "

## 2021-03-23 NOTE — PROGRESS NOTES
New Neurology Patient Office Visit      Patient Name: Arlene Calderon    Referring Physician: Maggi Talamantes APRN    Chief Complaint:    Chief Complaint   Patient presents with   • Cerebrovascular Accident     NP/Pt is here for a stroke that she had on 01/28/21. Pt states that she has no memory of the event and woke up in the hospital. Pt states she spent 2 weeks at [facility] after the event and has gained most mobility back. Pt states that she is having trouble with speech, reading, comprehension, and short term memory loss.        History of Present Illness: Arlene Calderon is a 55 y.o. female who is here today to establish care with Neurology.  She unfortunately suffered a large left frontal ICH.   DC to [facility], has been receiving OP PT. Has not been able to receive SLP due to insurance difficulties.   Complains of difficulties with expressive aphasia, has difficulty with texting and readingHas intermittent WFD, conversational lapses.Has been practicing reading words aloud, crossword puzzles, and computer games.   Underwent gastric sleeve surgery two years ago with resolution of underlying health conditions (HL, HTN, pre-DM).    Asking about exercise, she walks regularly with a neighbor and is able to tolerate this well.   She has successfully stopped smoking with Chantix.     The following portions of the patient's history were reviewed and updated as appropriate: allergies, current medications, past family history, past medical history, past social history, past surgical history and problem list.    Subjective      Review of Systems:   Review of Systems   Constitutional: Negative for activity change and fatigue.   HENT: Negative for drooling and voice change.    Eyes: Negative for blurred vision, double vision, photophobia and visual disturbance.   Respiratory: Negative for shortness of breath.    Cardiovascular: Negative for chest pain and palpitations.   Gastrointestinal: Negative for nausea and vomiting.    Genitourinary: Negative for urinary incontinence.   Musculoskeletal: Negative for arthralgias, back pain, gait problem, myalgias and neck pain.   Allergic/Immunologic: Negative for immunocompromised state.   Neurological: Positive for tremors, speech difficulty, weakness, memory problem and confusion. Negative for dizziness, seizures, syncope, facial asymmetry, light-headedness, numbness and headache.   Hematological: Does not bruise/bleed easily.   Psychiatric/Behavioral: Negative for decreased concentration, dysphoric mood, hallucinations, sleep disturbance, depressed mood and stress. The patient is not nervous/anxious.      Past Medical History:   Past Medical History:   Diagnosis Date   • Stroke (cerebrum) (CMS/formerly [facility]) 2221       Past Surgical History:   Past Surgical History:   Procedure Laterality Date   •  SECTION     • GASTRIC SLEEVE LAPAROSCOPIC  2019       Family History:   Family History   Problem Relation Age of Onset   • Diabetes Father    • Heart disease Mother    • Diabetes Brother        Social History:   Social History     Socioeconomic History   • Marital status:      Spouse name: Not on file   • Number of children: Not on file   • Years of education: Not on file   • Highest education level: Not on file   Tobacco Use   • Smoking status: Former Smoker   • Smokeless tobacco: Never Used   Vaping Use   • Vaping Use: Never used   Substance and Sexual Activity   • Alcohol use: Yes     Comment: WEEKENDS   • Drug use: Never   • Sexual activity: Yes     Partners: Male     Birth control/protection: None, Post-menopausal       Medications:     Current Outpatient Medications:   •  amLODIPine (NORVASC) 5 MG tablet, Take 5 mg by mouth Daily., Disp: , Rfl:   •  Chantix Starting Month Russell 0.5 MG X 11 & 1 MG X 42 tablet, Take  by mouth Daily., Disp: , Rfl:   •  levETIRAcetam (KEPPRA) 500 MG tablet, Take 1 tablet by mouth 2 (Two) Times a Day for 30 days., Disp: 60 tablet, Rfl: 0  •  multivitamin  "(MULTI VITAMIN DAILY PO), Take  by mouth Daily., Disp: , Rfl:   •  vitamin D (ERGOCALCIFEROL) 1.25 MG (26253 UT) capsule capsule, Take 50,000 Units by mouth 1 (One) Time Per Week., Disp: , Rfl:     Allergies:   No Known Allergies    Objective     Physical Exam:  Vital Signs:   Vitals:    03/23/21 1315   BP: 100/66   Pulse: 68   Temp: 97.5 °F (36.4 °C)   SpO2: 96%   Weight: 64.4 kg (142 lb)   Height: 154.9 cm (61\")   PainSc: 0-No pain       Physical Exam  Vitals and nursing note reviewed.   Eyes:      Extraocular Movements: EOM normal.      Pupils: Pupils are equal, round, and reactive to light.   Neck:      Vascular: No carotid bruit.   Cardiovascular:      Rate and Rhythm: Regular rhythm.      Heart sounds: Normal heart sounds.   Pulmonary:      Effort: Pulmonary effort is normal.      Breath sounds: Normal breath sounds.   Skin:     General: Skin is warm.   Neurological:      Mental Status: She is oriented to person, place, and time.      Coordination: Romberg Test normal.      Gait: Gait is intact.      Deep Tendon Reflexes: Strength normal.   Psychiatric:         Mood and Affect: Mood normal.         Speech: Speech normal.         Behavior: Behavior normal.         Thought Content: Thought content normal.         Judgment: Judgment normal.         Neurologic Exam     Mental Status   Oriented to person, place, and time.   Attention: normal. Concentration: normal.   Speech: speech is normal   Level of consciousness: alert  Normal comprehension.     Cranial Nerves     CN II   Visual fields full to confrontation.   Visual acuity: normal    CN III, IV, VI   Pupils are equal, round, and reactive to light.  Extraocular motions are normal.   Right pupil: Shape: regular. Reactivity: brisk.   Left pupil: Shape: regular. Reactivity: brisk.   Diplopia: none  Ophthalmoparesis: none  Upgaze: normal  Downgaze: normal    CN V   Facial sensation intact.     CN VII   Facial expression full, symmetric.     CN VIII   CN VIII " normal.     CN IX, X   CN IX normal.   CN X normal.     CN XI   CN XI normal.     CN XII   CN XII normal.     Motor Exam   Muscle bulk: normal  Overall muscle tone: normal  Right arm pronator drift: absent  Left arm pronator drift: absent    Strength   Strength 5/5 throughout.     Sensory Exam   Light touch normal.     Gait, Coordination, and Reflexes     Gait  Gait: normal    Coordination   Romberg: negative    Tremor   Resting tremor: absent    Reflexes   Reflexes 2+ except as noted.      CT Abdomen Without Contrast    Result Date: 1/30/2021  1. No findings to suggest primary neoplasm within the chest or abdomen. Somewhat limited evaluation the absence of intravenous contrast.  2. Limited evaluation of the pelvis. Soft tissue structure seen along the posterior margin of the bladder is presumed to be enlarged fibroid uterus but is incompletely visualized.   This report was finalized on 1/30/2021 1:46 PM by Abdoulaye Foreman.      CT Head Without Contrast    Addendum Date: 1/31/2021    There is an additional 5 x 5 x 5 mm rounded focus of hemorrhage in the right frontoparietal vertex (series 4 image 24). This was communicated with Raf Jiménez with neurosurgery at 06 on 1/31/2021.  This report was finalized on 1/31/2021 6:59 AM by Abdoulaye Foreman.      Result Date: 1/31/2021  Left frontal lobe intraparenchymal hemorrhage which appears slightly increased in size compared to CTA of the head and neck performed 1/29/2021. No midline shift or mass effect on the adjacent ventricles. Surrounding edema appears unchanged.   This report was finalized on 1/30/2021 1:36 PM by Abdoulaye Foreman.      CT Angiogram Neck    Result Date: 1/29/2021  1. No significant vascular abnormality is identified involving the vessels of the neck or the [place]. There is noncritical cervical internal carotid artery stenosis, greater on the right. 2. Area of lobar hemorrhage is seen in the patient's left posterior frontal lobe with surrounding edema.  Signer Name: Gris Reynolds MD  Signed: 1/29/2021 9:13 PM  Workstation Name: [id number]  [facility]    CT CHEST WITHOUT CONTRAST DIAGNOSTIC    Result Date: 1/30/2021  1. No findings to suggest primary neoplasm within the chest or abdomen. Somewhat limited evaluation the absence of intravenous contrast.  2. Limited evaluation of the pelvis. Soft tissue structure seen along the posterior margin of the bladder is presumed to be enlarged fibroid uterus but is incompletely visualized.   This report was finalized on 1/30/2021 1:46 PM by Abdoulaye Foreman.      MRI Brain With & Without Contrast    Result Date: 1/31/2021  Left frontal lobe hematoma without obvious contrast enhancement or solid component. Additional small focus of hematoma in the right parieto-occipital region without suspicious enhancement or solid component. There is some mild surrounding edema with both of these regions however no gross mass effect.  This report was finalized on 1/31/2021 7:15 AM by Abdoulaye Foreman.      FL Video Swallow With Speech Single Contrast    Result Date: 2/2/2021  Fluoroscopy provided for a modified barium swallow. Please see speech therapy report for full details and recommendations.    This report was finalized on 2/2/2021 10:51 AM by Dr. Philip Dey.      XR Chest 1 View    Result Date: 2/1/2021  No evidence of acute disease in the chest.  This report was finalized on 2/1/2021 3:43 PM by Gutierrez Kapoor.      XR Chest 1 View    Result Date: 1/30/2021  No acute cardiopulmonary process.  Continued followup is recommended.  This report was finalized on 1/30/2021 7:41 AM by Dayton Mays DO.    MRI Angiogram Venogram Head    Result Date: 1/31/2021  Somewhat limited exam due to motion. Inferior sagittal sinus is attenuated and only faintly visualized which may be sequela of motion artifact. Remaining sinuses are patent.  This report was finalized on 1/31/2021 6:48 AM by Abdoulaye Foreman.      CT Angiogram Head    Result  Date: 1/29/2021  1. No significant vascular abnormality is identified involving the vessels of the neck or the [place]. There is noncritical cervical internal carotid artery stenosis, greater on the right. 2. Area of lobar hemorrhage is seen in the patient's left posterior frontal lobe with surrounding edema. Signer Name: Gris Reynolds MD  Signed: 1/29/2021 9:13 PM  Workstation Name: [id number]  [facility]    CT Head Without Contrast Stroke Protocol    Result Date: 1/29/2021  Large left frontal intraparenchymal hematoma with surrounding vasogenic edema and localized mass effect.  No significant midline shift. Authenticated by Luis Partida MD on 01/29/2021 06:45:07 PM    Echo  · Saline test results are positive with valsalva manuever for right to left atrial level shunt.  · Estimated left ventricular EF = 60% Estimated left ventricular EF was in agreement with the calculated left ventricular EF. Left ventricular ejection fraction appears to be 56 - 60%. Left ventricular systolic function is normal.  · Left ventricular diastolic function was normal.  · Normal right ventricular cavity size, wall thickness, systolic function and septal motion noted.  · No evidence of pulmonary hypertension is present.  · There is no evidence of pericardial effusion.  · No significant structural or functional valvular abnormality demonstrated.  · Small patent foramen ovale present.    Results Review:   I reviewed the patient's new clinical results.  I have reviewed the patient's other medical records to include, labs, radiology and referrals.   I reviewed the patient's new imaging results and agree with the interpretation.  Inpatient hospital records reviewed  Discussed with Dr. Grubbs  Assessment / Plan      Assessment/Plan:   Diagnoses and all orders for this visit:    1. PFO with right-to-left atrial level shunt on Valsalva (Primary)  -     Ambulatory Referral to Cardiology  -     Doppler Transcranial  Microbubble Injection CAR; Future  Patient unfortunately recently suffered intracranial hemorrhage in January 2021.  She did have multiple areas of bleed, initially there was some concern for hemorrhagic conversion of embolic stroke.  Patient was found to have PFO on echocardiogram.  I have referred her to cardiology for evaluation of possible closure of PFO.  Even concern for hemorrhagic conversion of embolic strokes, I am requesting transcranial Doppler to assess for possible emboli which could have contributed to intracranial hemorrhages    2. Dysphasia due to recent stroke  -     Ambulatory Referral to Speech Therapy  Patient is fluent and conversant on exam today, however she complains of persistent speech difficulty since her stroke.  She did experience large left frontal intracranial hemorrhage, and I have referred her to speech therapy for evaluation and treatment.    3. Nontraumatic multiple localized intracerebral hemorrhages, unspecified laterality (CMS/HCC)  -     Doppler Transcranial Microbubble Injection CAR; Future  Patient has known PFO found on echocardiogram during hospitalization, have ordered TCD's to assess for possible cerebral emboli which may have contributed to intracranial hemorrhage.  She has an appointment with radiology for repeat MRI and evaluation with Dr. Villa on 329, I have discussed with her that it is important that she keep this appointment to ensure proper follow-up care.  Considering known PFO, initiate aspirin 81 mg daily if repeat MRI clear.     Follow Up:   Return in about 3 months (around 6/23/2021) for Next scheduled follow up.     ANNE Walls  [facility]   AS THE PROVIDER, I PERSONALLY WORE PPE DURING ENTIRE FACE TO FACE ENCOUNTER IN CLINIC WITH THE PATIENT. PATIENT ALSO WORE PPE DURING ENTIRE FACE TO FACE ENCOUNTER EXCEPT FOR A MAX OF 30 SECONDS DURING NEUROLOGICAL EVALUATION OF CRANIAL NERVES AND THEN MASK WAS PLACED BACK OVER PATIENT  FACE FOR REMAINDER OF VISIT. I WASHED MY HANDS BEFORE AND AFTER VISIT.    Please note that portions of this note may have been completed with a voice recognition program. Efforts were made to edit the dictations, but occasionally words are mistranscribed.

## 2021-03-24 ENCOUNTER — OFFICE VISIT (OUTPATIENT)
Dept: OBSTETRICS AND GYNECOLOGY | Facility: CLINIC | Age: 56
End: 2021-03-24

## 2021-03-24 ENCOUNTER — TELEPHONE (OUTPATIENT)
Dept: NEUROLOGY | Facility: CLINIC | Age: 56
End: 2021-03-24

## 2021-03-24 VITALS
DIASTOLIC BLOOD PRESSURE: 74 MMHG | BODY MASS INDEX: 27 KG/M2 | WEIGHT: 143 LBS | SYSTOLIC BLOOD PRESSURE: 112 MMHG | HEIGHT: 61 IN

## 2021-03-24 DIAGNOSIS — Z01.419 ENCOUNTER FOR GYNECOLOGICAL EXAMINATION WITHOUT ABNORMAL FINDING: Primary | ICD-10-CM

## 2021-03-24 PROCEDURE — 99386 PREV VISIT NEW AGE 40-64: CPT | Performed by: OBSTETRICS & GYNECOLOGY

## 2021-03-24 NOTE — TELEPHONE ENCOUNTER
Provider: ANNE HAN  Caller: CLAUDE WITH [facility]      Patient: MONI WILCOX   : 1965        Reason for Call: CLAUDE WITH [facility], IS CALLING THAT THEY RECEIVED A REFERRAL FOR SPEECH THERAPY FOR PT AND THEY ARE OUT OF NETWORK WITH PT'S INSURANCE. SHE STATES THAT THE PT IS WELCOME TO COME BUT THEY ARE OUT OF NETWORK.      IF YOU HAVE ANY QUESTIONS YOU CAN CALL HER BACK [phone]

## 2021-03-24 NOTE — PROGRESS NOTES
Subjective   Chief Complaint   Patient presents with   • Gynecologic Exam     last pap 10 years ago, last Mamm 10 years, pt has one scheduled      Arlene Calderon is a 55 y.o. year old   ( x 1) presenting to be seen for her annual exam. 10 years since pap and MMG- did cologuard recently. No breast Cancer in family. Hot flushes 12+ a day. Stroke last month-- functionally back to normal with exception of lsight short term memory issues.     SEXUAL Hx:  She is currently sexually active.  In the past year there has not been new sexual partners.    Condoms are not typically used.  She would not like to be screened for STD's at today's exam.  Current birth control method: not using any form of contraception and does not wish to get pregnant.  MENSTRUAL Hx:  No LMP recorded. Patient is postmenopausal.  In the past 6 months her cycles have been absent.   Her menstrual flow has been absent.   Each month on average there are roughly 0 days of very heavy flow.    Intermenstrual bleeding is absent.    Post-coital bleeding is absent.  Dysmenorrhea: is not affecting her activities of daily living  PMS: is not affecting her activities of daily living  Her cycles are not a source of concern for her that she wishes to discuss today.  HEALTH Hx:  She exercises regularly: no (and has no plans to become more active).  She wears her seat belt:yes.  She has concerns about domestic violence: no.  OTHER COMPLAINTS:  Nothing else    The following portions of the patient's history were reviewed and updated as appropriate:  She  has a past medical history of Stroke (cerebrum) (CMS/[facility]) (2221).  She does not have any pertinent problems on file.  She  has a past surgical history that includes Gastric Sleeve (2019) and  section.  Her family history includes Diabetes in her brother and father; Heart disease in her mother.  She  reports that she has quit smoking. She has never used smokeless tobacco. She reports current  "alcohol use. She reports that she does not use drugs.  Current Outpatient Medications   Medication Sig Dispense Refill   • amLODIPine (NORVASC) 5 MG tablet Take 5 mg by mouth Daily.     • Chantix Starting Month Russell 0.5 MG X 11 & 1 MG X 42 tablet Take  by mouth Daily.     • levETIRAcetam (KEPPRA) 500 MG tablet Take 1 tablet by mouth 2 (Two) Times a Day for 30 days. 60 tablet 0   • multivitamin (MULTI VITAMIN DAILY PO) Take  by mouth Daily.     • vitamin D (ERGOCALCIFEROL) 1.25 MG (12709 UT) capsule capsule Take 50,000 Units by mouth 1 (One) Time Per Week.       No current facility-administered medications for this visit.     Current Outpatient Medications on File Prior to Visit   Medication Sig   • amLODIPine (NORVASC) 5 MG tablet Take 5 mg by mouth Daily.   • Chantix Starting Month Russell 0.5 MG X 11 & 1 MG X 42 tablet Take  by mouth Daily.   • levETIRAcetam (KEPPRA) 500 MG tablet Take 1 tablet by mouth 2 (Two) Times a Day for 30 days.   • multivitamin (MULTI VITAMIN DAILY PO) Take  by mouth Daily.   • vitamin D (ERGOCALCIFEROL) 1.25 MG (63166 UT) capsule capsule Take 50,000 Units by mouth 1 (One) Time Per Week.     No current facility-administered medications on file prior to visit.     She has No Known Allergies..    Social History    Tobacco Use      Smoking status: Former Smoker      Smokeless tobacco: Never Used    Review of Systems  Consitutional NEG: anorexia or night sweats    POS: nothing reported   Gastointestinal NEG: bloating, change in bowel habits, melena or reflux symptoms    POS: nothing reported   Genitourinary NEG: dysuria or hematuria    POS: nothing reported   Integument NEG: moles that are changing in size, shape, color or rashes    POS: nothing reported   Breast NEG: persistent breast lump, skin dimpling or nipple discharge    POS: nothing reported          Objective   /74   Ht 154.9 cm (61\")   Wt 64.9 kg (143 lb)   BMI 27.02 kg/m²     General:  well developed; well nourished  no acute " distress   Skin:  No suspicious lesions seen   Thyroid: normal to inspection and palpation   Breasts:  Examined in supine position  Symmetric without masses or skin dimpling  Nipples normal without inversion, lesions or discharge  There are no palpable axillary nodes   Abdomen: soft, non-tender; no masses  no umbilical or inguinal hernias are present  no hepato-splenomegaly   Pelvis: Clinical staff was present for exam  External genitalia:  normal appearance of the external genitalia including Bartholin's and [place]'s glands.  :  urethral meatus normal;  Vaginal:  normal pink mucosa without prolapse or lesions.  Cervix:  normal appearance.  Uterus:  normal size, shape and consistency.  Adnexa:  normal bimanual exam of the adnexa.  Rectal:  digital rectal exam not performed; anus visually normal appearing.        Assessment   1. Normal PE       Plan   1. PAP done  2. MMG ordered  3. Follow up prn, discussed hot flushes-- behavioural mgmt-- would not rec meds give CVA history  4. Diet/exercise    No orders of the defined types were placed in this encounter.         This note was electronically signed.      March 24, 2021

## 2021-03-29 ENCOUNTER — OFFICE VISIT (OUTPATIENT)
Dept: NEUROSURGERY | Facility: CLINIC | Age: 56
End: 2021-03-29

## 2021-03-29 ENCOUNTER — HOSPITAL ENCOUNTER (OUTPATIENT)
Dept: MRI IMAGING | Facility: HOSPITAL | Age: 56
Discharge: HOME | End: 2021-03-29
Admitting: PHYSICIAN ASSISTANT

## 2021-03-29 VITALS — BODY MASS INDEX: 26.73 KG/M2 | HEIGHT: 61 IN | WEIGHT: 141.6 LBS | TEMPERATURE: 97.5 F

## 2021-03-29 DIAGNOSIS — I61.1 NONTRAUMATIC CORTICAL HEMORRHAGE OF CEREBRAL HEMISPHERE, UNSPECIFIED LATERALITY (HCC): Primary | ICD-10-CM

## 2021-03-29 DIAGNOSIS — I61.1 NONTRAUMATIC CORTICAL HEMORRHAGE OF CEREBRAL HEMISPHERE, UNSPECIFIED LATERALITY (HCC): ICD-10-CM

## 2021-03-29 PROCEDURE — 99214 OFFICE O/P EST MOD 30 MIN: CPT | Performed by: NEUROLOGICAL SURGERY

## 2021-03-29 PROCEDURE — A9577 INJ MULTIHANCE: HCPCS | Performed by: PHYSICIAN ASSISTANT

## 2021-03-29 PROCEDURE — 82565 ASSAY OF CREATININE: CPT

## 2021-03-29 PROCEDURE — 70553 MRI BRAIN STEM W/O & W/DYE: CPT

## 2021-03-29 PROCEDURE — 0 GADOBENATE DIMEGLUMINE 529 MG/ML SOLUTION: Performed by: PHYSICIAN ASSISTANT

## 2021-03-29 RX ADMIN — GADOBENATE DIMEGLUMINE 12 ML: 529 INJECTION, SOLUTION INTRAVENOUS at 14:44

## 2021-03-29 NOTE — PROGRESS NOTES
NAME: MONI WILCOX   DOS: 3/29/2021  : 1965  PCP: Maggi Talamantes APRN    Chief Complaint:    Chief Complaint   Patient presents with   • ICH       History of Present Illness:  55 y.o. female   Saw this 55-year-old female in neurosurgical consultation she presented with a complex history of Covid infection complicated by left frontal intracranial hemorrhage she was hospitalized and diagnosed with hypertension during that hospitalization.  She is gone to [facility] and made a remarkable recovery she has no real symptoms other than short-term memory and is regained all of her function on the right side    PMHX  Allergies:  No Known Allergies  Medications    Current Outpatient Medications:   •  amLODIPine (NORVASC) 5 MG tablet, Take 5 mg by mouth Daily., Disp: , Rfl:   •  Chantix Starting Month Russell 0.5 MG X 11 & 1 MG X 42 tablet, Take  by mouth Daily., Disp: , Rfl:   •  levETIRAcetam (KEPPRA) 500 MG tablet, Take 1 tablet by mouth 2 (Two) Times a Day for 30 days., Disp: 60 tablet, Rfl: 0  •  multivitamin (MULTI VITAMIN DAILY PO), Take  by mouth Daily., Disp: , Rfl:   •  vitamin D (ERGOCALCIFEROL) 1.25 MG (64648 UT) capsule capsule, Take 50,000 Units by mouth 1 (One) Time Per Week., Disp: , Rfl:   No current facility-administered medications for this visit.  Past Medical History:  Past Medical History:   Diagnosis Date   • Hypertension    • Stroke (cerebrum) (CMS/HCC) 2221     Past Surgical History:  Past Surgical History:   Procedure Laterality Date   •  SECTION     • GASTRIC SLEEVE LAPAROSCOPIC  2019     Social Hx:  Social History     Tobacco Use   • Smoking status: Former Smoker     Quit date: 2021     Years since quittin.1   • Smokeless tobacco: Never Used   Vaping Use   • Vaping Use: Never used   Substance Use Topics   • Alcohol use: Yes     Comment: WEEKENDS   • Drug use: Never     Family Hx:  Family History   Problem Relation Age of Onset   • Diabetes Father    • Heart disease  Mother    • Diabetes Brother      Review of Systems:        Review of Systems   Constitutional: Positive for chills. Negative for activity change, appetite change, diaphoresis, fatigue, fever and unexpected weight change.   HENT: Positive for drooling. Negative for congestion, dental problem, ear discharge, ear pain, facial swelling, hearing loss, mouth sores, nosebleeds, postnasal drip, rhinorrhea, sinus pressure, sinus pain, sneezing, sore throat, tinnitus, trouble swallowing and voice change.    Eyes: Negative for photophobia, pain, discharge, redness, itching and visual disturbance.   Respiratory: Negative for apnea, cough, choking, chest tightness, shortness of breath, wheezing and stridor.    Cardiovascular: Negative for chest pain, palpitations and leg swelling.   Gastrointestinal: Negative for abdominal distention, abdominal pain, anal bleeding, blood in stool, constipation, diarrhea, nausea, rectal pain and vomiting.   Endocrine: Negative for cold intolerance, heat intolerance, polydipsia, polyphagia and polyuria.   Genitourinary: Negative for decreased urine volume, difficulty urinating, dyspareunia, dysuria, enuresis, flank pain, frequency, genital sores, hematuria, menstrual problem, pelvic pain, urgency, vaginal bleeding, vaginal discharge and vaginal pain.   Musculoskeletal: Negative for arthralgias, back pain, gait problem, joint swelling, myalgias, neck pain and neck stiffness.   Skin: Negative for color change, pallor, rash and wound.   Allergic/Immunologic: Negative for environmental allergies, food allergies and immunocompromised state.   Neurological: Negative for dizziness, tremors, seizures, syncope, facial asymmetry, speech difficulty, weakness, light-headedness, numbness and headaches.   Hematological: Negative for adenopathy. Does not bruise/bleed easily.   Psychiatric/Behavioral: Negative for agitation, behavioral problems, confusion, decreased concentration, dysphoric mood, hallucinations,  self-injury, sleep disturbance and suicidal ideas. The patient is not nervous/anxious and is not hyperactive.       I have reviewed this note template and all pertinent parts of the review of systems social, family history, surgical history and medication list      Physical Examination:  Vitals:    03/29/21 1537   Temp: 97.5 °F (36.4 °C)      General Appearance:   Well developed, well nourished, well groomed, alert, and cooperative.  Neurological examination:  Neurologic Exam  She is awake alert and follows commands    GCS is 15    Cranial nerves grossly intact    No evidence of motor drift may be subtle right-sided weakness    Gait and station all normal    Review of Imaging/DATA:  MRI was reviewed that shows resolving left frontal hemorrhage  Diagnoses/Plan:    Ms. Calderon is a 55 y.o. female   55-year-old female with an atypical presentation of left frontal hemorrhage could be from cortical vein thrombosis, hypertensive hemorrhage, amyloid etc.    MRIs showed resolving hemorrhage no evidence of tumor I do think given the unusual presentation I like to follow her up in about 4 months with a repeat MRI just to ensure there is ongoing resolution and no other secondary pathologies    I explained the signs and symptoms to look for to necessitate a referral back    Counseled her on antihypertensive therapy    She is can continue on her antiepileptic medicines and is followed up with neurology

## 2021-03-30 DIAGNOSIS — Z01.419 ENCOUNTER FOR GYNECOLOGICAL EXAMINATION WITHOUT ABNORMAL FINDING: ICD-10-CM

## 2021-04-01 LAB — CREAT BLDA-MCNC: 0.6 MG/DL (ref 0.6–1.3)

## 2021-06-23 ENCOUNTER — OFFICE VISIT (OUTPATIENT)
Dept: NEUROLOGY | Facility: CLINIC | Age: 56
End: 2021-06-23

## 2021-06-23 VITALS
TEMPERATURE: 96.9 F | OXYGEN SATURATION: 98 % | WEIGHT: 151.2 LBS | HEIGHT: 62 IN | HEART RATE: 87 BPM | SYSTOLIC BLOOD PRESSURE: 110 MMHG | BODY MASS INDEX: 27.82 KG/M2 | DIASTOLIC BLOOD PRESSURE: 66 MMHG

## 2021-06-23 DIAGNOSIS — Q21.12 PFO (PATENT FORAMEN OVALE): Primary | ICD-10-CM

## 2021-06-23 DIAGNOSIS — I69.321: ICD-10-CM

## 2021-06-23 PROCEDURE — 99213 OFFICE O/P EST LOW 20 MIN: CPT | Performed by: NURSE PRACTITIONER

## 2021-06-23 RX ORDER — CITALOPRAM 40 MG/1
40 TABLET ORAL DAILY
COMMUNITY
Start: 2021-05-07

## 2021-06-23 RX ORDER — BUSPIRONE HYDROCHLORIDE 10 MG/1
10 TABLET ORAL 2 TIMES DAILY PRN
COMMUNITY
Start: 2021-05-07

## 2021-06-23 NOTE — PATIENT INSTRUCTIONS
"Patent Foramen Ovale, Adult    A foramen ovale is a hole between the upper chambers (right atrium and left atrium) of the heart. Before you are born, it is normal to have this hole in your heart. The hole allows blood to circulate through the body without having to go through the lungs. After your birth, when you are able to breathe, you do not need the foramen ovale and it usually closes. If the hole does not close, it is called a patent foramen ovale (PFO).  PFO is a common condition. Most people do not know they have this hole and they do not have any health problems caused by it.  What are the causes?  The cause of this condition is not known.  What increases the risk?  You are more likely to develop this condition if:  · You have a family history of PFO.  · You also have other heart disease that is present at birth (congenital heart disease).  What are the signs or symptoms?  In most cases, there are no symptoms of this condition. Possible rare symptoms include:  · Stroke caused by a blood clot.  · Transient ischemic attack (TIA). This is a \"warning stroke\" that causes stroke-like symptoms that go away quickly.  · Migraine headaches.  How is this diagnosed?  This condition may be diagnosed based on:  · A physical exam and your medical history.  · Echocardiogram. This test uses sound waves to produce images of the heart.  · Transesophageal echocardiogram (SHAHLA). This type of echocardiogram is performed by placing a probe in the part of the body that moves food from the mouth to the stomach (esophagus).  · Electrocardiogram (ECG). This test identifies changes in the electrical activity of the heart.  · Cardiac MRI. This is an imaging technique that is used to visualize the heart, if further images are needed after SHAHLA.  How is this treated?  Usually, no treatment is needed. If your condition is associated with symptoms or blood clots, you may need:  · Medicines to prevent blood clots and strokes (anticoagulant or " "antiplatelet medicines).  · A surgical procedure to close the hole (transcatheter closure).  Follow these instructions at home:  · Take over-the-counter and prescription medicines only as told by your health care provider.  · Keep all follow-up visits as told by your health care provider. This is important.  Contact a health care provider if:  · You have a fever.  · You have frequent or severe headaches.  Get help right away if:  · Your skin turns blue.  · You have chest pain or difficulty breathing.  · You have any symptoms of stroke. \"BE FAST\" is an easy way to remember the main warning signs of stroke:  ? B - Balance. Signs are dizziness, sudden trouble walking, or loss of balance.  ? E - Eyes. Signs are trouble seeing or a sudden change in vision.  ? F - Face. Signs are sudden weakness or numbness of the face, or the face or eyelid drooping on one side.  ? A - Arms. Signs are weakness or numbness in an arm. This happens suddenly and usually on one side of the body.  ? S - Speech. Signs are sudden trouble speaking, slurred speech, or trouble understanding what people say.  ? T - Time. Time to call emergency services. Write down what time symptoms started.  · You have other signs of stroke, which may include:  ? A sudden, severe headache with no known cause.  ? Nausea or vomiting.  ? Seizure.  These symptoms may represent a serious problem that is an emergency. Do not wait to see if the symptoms will go away. Get medical help right away. Call your local emergency services (911 in the [place] Do not drive yourself to the hospital.  Summary  · A patent foramen ovale is a hole between the upper chambers (right atrium and left atrium) of your heart. The cause of this condition is not known.  · You may not know that you have a hole in your heart, and you may not have any health problems associated with it.  · Usually, no treatment is needed for this condition unless it is associated with symptoms or blood clots.  This " "information is not intended to replace advice given to you by your health care provider. Make sure you discuss any questions you have with your health care provider.  Document Revised: 10/22/2020 Document Reviewed: 02/27/2018  Elsevier Patient Education © 2021 [employer]    Warning Signs of a Stroke    A stroke is a medical emergency and should be treated right away--every second counts. A stroke is caused by a decrease or block in blood flow to the brain. When this occurs, certain areas of the brain do not get enough oxygen, and brain cells begin to die.  A stroke can lead to brain damage and can sometimes be life-threatening. However, if someone having a stroke gets medical treatment right away, he or she has better chances of surviving and recovering from the stroke. Being able to recognize the symptoms of a stroke is very important.  Types of strokes  There are two main types of strokes:  · Ischemic strokes. This is the most common type of stroke. These strokes happen when a blood vessel that supplies blood to the brain is being blocked.  · Hemorrhagic strokes. These strokes result from bleeding in the brain due to a blood vessel leaking or bursting (rupturing).  A transient ischemic attack (TIA) is a \"warning stroke\" that causes stroke-like symptoms that go away quickly. Unlike a stroke, a TIA does not cause permanent damage to the brain. However, the symptoms of a TIA are the same as a stroke, and they also require medical treatment right away. Having a TIA is a sign that you are at higher risk for a permanent stroke.  Warning signs of a stroke  The symptoms of stroke may vary and will reflect the part of the brain that is involved. Symptoms usually happen suddenly. \"BE FAST\" is an easy way to remember the main warning signs of a stroke.  B - Balance  Signs are dizziness, sudden trouble walking, or loss of balance.  E - Eyes  Signs are trouble seeing or a sudden change in vision.  F - Face  Signs are sudden " "weakness or numbness of the face, or the face or eyelid drooping on one side.  A - Arms  Signs are weakness or numbness in an arm. This happens suddenly and usually on one side of the body.  S - Speech  Signs are sudden trouble speaking, slurred speech, or trouble understanding what people say.  T - Time  Time to call emergency services. Write down what time symptoms started.  Other signs of a stroke  Some less common signs of a stroke include:  · A sudden, severe headache with no known cause.  · Nausea or vomiting.  · Seizure.  A stroke may be happening even if only one \"BE FAST\" symptoms is present.  These symptoms may represent a serious problem that is an emergency. Do not wait to see if the symptoms will go away. Get medical help right away. Call your local emergency services (911 in the [place] Do not drive yourself to the hospital.  Summary  · A stroke is a medical emergency and should be treated right away--every second counts.  · \"BE FAST\" is an easy way to remember the main warning signs of a stroke.  · Call local emergency services right away if you or someone else has any stroke symptoms, even if the symptoms go away.  · Make note of what time the first symptoms appeared. Emergency responders or emergency room staff will need to know this information.  · Do not wait to see if symptoms will go away. Call 911 even if only one of the \"BE FAST\" symptoms appears.  This information is not intended to replace advice given to you by your health care provider. Make sure you discuss any questions you have with your health care provider.  Document Revised: 11/30/2018 Document Reviewed: 04/06/2018  Elsevier Patient Education © 2021 [employer]    "

## 2021-06-23 NOTE — PROGRESS NOTES
Follow Up Neurology Office Visit      Patient Name: Arlene Calderon    Referring Physician: No ref. provider found    Chief Complaint:    Chief Complaint   Patient presents with   • Follow-up     patient in office to follow up for post stroke.  Patient states she is doing better since last office visit and has no new complaints at this time.       History of Present Illness: Arlene Calderon is a 55 y.o. female who is here to follow up with Neurology after suffering stroke  'They put me on new medicine because I keep getting aggravated'    Having trouble keeping up at work, frequently gets overwhelmed. Still has WFD, often gets frustrated by this, frequently to the point of tears.    Did not have any previous testing as ordered    The following portions of the patient's history were reviewed and updated as appropriate: allergies, current medications, past family history, past medical history, past social history, past surgical history and problem list.    Subjective     Review of Systems:   Review of Systems   Constitutional: Negative for activity change and fatigue.   HENT: Negative for drooling and voice change.    Eyes: Negative for blurred vision, double vision, photophobia and visual disturbance.   Respiratory: Negative for shortness of breath.    Cardiovascular: Negative for chest pain and palpitations.   Gastrointestinal: Negative for nausea and vomiting.   Genitourinary: Negative for urinary incontinence.   Musculoskeletal: Negative for arthralgias, back pain, gait problem, myalgias and neck pain.   Allergic/Immunologic: Negative for immunocompromised state.   Neurological: Positive for speech difficulty and weakness. Negative for dizziness, tremors, seizures, syncope, facial asymmetry, light-headedness, numbness, headache, memory problem and confusion.   Hematological: Does not bruise/bleed easily.   Psychiatric/Behavioral: Negative for decreased concentration, dysphoric mood, hallucinations, sleep  "disturbance, depressed mood and stress. The patient is not nervous/anxious.      Medications:     Current Outpatient Medications:   •  amLODIPine (NORVASC) 5 MG tablet, Take 5 mg by mouth Daily., Disp: , Rfl:   •  Chantix Starting Month Russell 0.5 MG X 11 & 1 MG X 42 tablet, Take  by mouth Daily., Disp: , Rfl:   •  levETIRAcetam (KEPPRA) 500 MG tablet, Take 1 tablet by mouth 2 (Two) Times a Day for 30 days., Disp: 60 tablet, Rfl: 0  •  multivitamin (MULTI VITAMIN DAILY PO), Take  by mouth Daily., Disp: , Rfl:   •  vitamin D (ERGOCALCIFEROL) 1.25 MG (99120 UT) capsule capsule, Take 50,000 Units by mouth 1 (One) Time Per Week., Disp: , Rfl:   •  busPIRone (BUSPAR) 10 MG tablet, Take 10 mg by mouth 2 (Two) Times a Day As Needed., Disp: , Rfl:   •  citalopram (CeleXA) 40 MG tablet, Take 40 mg by mouth Daily., Disp: , Rfl:     Allergies:   No Known Allergies    Objective     Physical Exam:  Vital Signs:   Vitals:    06/23/21 1444   BP: 110/66   Pulse: 87   Temp: 96.9 °F (36.1 °C)   SpO2: 98%   Weight: 68.6 kg (151 lb 3.2 oz)   Height: 157.5 cm (62.01\")   PainSc: 0-No pain       Physical Exam  Vitals and nursing note reviewed.   Neurological:      Mental Status: She is oriented to person, place, and time.      Gait: Gait is intact.      Deep Tendon Reflexes: Strength normal.   Psychiatric:         Speech: Speech normal.       Neurologic Exam     Mental Status   Oriented to person, place, and time.   Attention: normal. Concentration: normal.   Speech: speech is normal   Level of consciousness: alert  Knowledge: consistent with education.     Cranial Nerves   Cranial nerves II through XII intact.     CN II   Visual acuity: normal with correction    Motor Exam   Muscle bulk: normal  Overall muscle tone: normal  Right arm pronator drift: absent  Left arm pronator drift: absent    Strength   Strength 5/5 throughout.     Gait, Coordination, and Reflexes     Gait  Gait: normal    Tremor   Resting tremor: absent    Reflexes "   Reflexes 2+ except as noted.     Results Review:   I reviewed the patient's new clinical results.  I have reviewed the patient's other medical records to include, labs, radiology and referrals.     Assessment / Plan      Assessment/Plan:   Diagnoses and all orders for this visit:    1. PFO (patent foramen ovale) (Primary)  -     Ambulatory Referral to Cardiology  -     Doppler Transcranial Microbubble Injection CAR; Future    2. Dysphasia due to recent stroke  -     Ambulatory Referral to Speech Therapy     Patient unfortunately recently suffered intracranial hemorrhage in January 2021.  She did have multiple areas of bleed, initially there was some concern for hemorrhagic conversion of embolic stroke.  Patient was found to have PFO on echocardiogram.  I have referred her to cardiology for evaluation of possible closure of PFO.  Even concern for hemorrhagic conversion of embolic strokes, I am requesting transcranial Doppler to assess for possible emboli which could have contributed to intracranial hemorrhages.    Patient is fluent and conversant on exam today, however she complains of persistent speech difficulty since her stroke.  She did experience large left frontal intracranial hemorrhage, and I have referred her to speech therapy for evaluation and treatment.     Follow Up:   Return in about 3 months (around 9/23/2021) for Next scheduled follow up.     [place] APRLA NENA  [facility]   AS THE PROVIDER, I PERSONALLY WORE PPE DURING ENTIRE FACE TO FACE ENCOUNTER IN CLINIC WITH THE PATIENT. PATIENT ALSO WORE PPE DURING ENTIRE FACE TO FACE ENCOUNTER EXCEPT FOR A MAX OF 30 SECONDS DURING NEUROLOGICAL EVALUATION OF CRANIAL NERVES AND THEN MASK WAS PLACED BACK OVER PATIENT FACE FOR REMAINDER OF VISIT. I WASHED MY HANDS BEFORE AND AFTER VISIT.    Please note that portions of this note may have been completed with a voice recognition program. Efforts were made to edit the dictations, but  occasionally words are mistranscribed.

## 2021-06-28 ENCOUNTER — OFFICE VISIT (OUTPATIENT)
Dept: CARDIOLOGY | Facility: CLINIC | Age: 56
End: 2021-06-28

## 2021-06-28 VITALS
SYSTOLIC BLOOD PRESSURE: 118 MMHG | OXYGEN SATURATION: 95 % | HEIGHT: 62 IN | BODY MASS INDEX: 27.79 KG/M2 | DIASTOLIC BLOOD PRESSURE: 84 MMHG | RESPIRATION RATE: 18 BRPM | HEART RATE: 90 BPM | WEIGHT: 151 LBS

## 2021-06-28 DIAGNOSIS — I63.9 CEREBROVASCULAR ACCIDENT (CVA), UNSPECIFIED MECHANISM (HCC): ICD-10-CM

## 2021-06-28 DIAGNOSIS — I10 ESSENTIAL HYPERTENSION: ICD-10-CM

## 2021-06-28 DIAGNOSIS — Q21.12 PFO (PATENT FORAMEN OVALE): Primary | ICD-10-CM

## 2021-06-28 DIAGNOSIS — R07.9 CHEST PAIN, UNSPECIFIED TYPE: ICD-10-CM

## 2021-06-28 DIAGNOSIS — R55 SYNCOPE AND COLLAPSE: ICD-10-CM

## 2021-06-28 DIAGNOSIS — I61.1 NONTRAUMATIC CORTICAL HEMORRHAGE OF CEREBRAL HEMISPHERE, UNSPECIFIED LATERALITY (HCC): ICD-10-CM

## 2021-06-28 PROCEDURE — 93000 ELECTROCARDIOGRAM COMPLETE: CPT | Performed by: NURSE PRACTITIONER

## 2021-06-28 PROCEDURE — 99214 OFFICE O/P EST MOD 30 MIN: CPT | Performed by: NURSE PRACTITIONER

## 2021-06-28 NOTE — PROGRESS NOTES
"     [facility] Cardiology Office Consult Note    Arlene Calderon  [id number]  2021    Referred By: [place] A*    Chief Complaint: Evaluation of PFO    History of Present Illness:   Mrs. Arlene Calderon is a 55 y.o. female who presents to the Cardiology Clinic for evaluation of patent foramen ovale (PFO).  This patient has a past medical history of hypertension, intracranial hemorrhage, CVA, and remote history of heavy tobacco abuse.  The patient does not have a personal history of cardiac disease.  She has never had an ischemic evaluation.  Patient reports that she suffered a stroke in 2021.  She states that she must have been awake at some point because she reports she was found \"passed out\" in bed fully dressed.  She has no memory of this event.  She reports that she had multiple hemorrhages at that time.  She states that she used to have high cholesterol, but reports that this has been well controlled since her gastric sleeve procedure in 2019.  She reports having gained 20 pounds since the beginning of the year due to stress and poor eating habits.  She also notes that she is starting to \"de los santos and puff\" when she walks.  She started smoking at age 8 and states that at one time she was up to 3 packs/day.  She notes that she currently smokes less than a pack a day and states she i will quit tomorrow.  Post stroke, she complains of speech delay and memory loss.  She reports her mother  at age 56 of a myocardial infarction, but states that her mother also suffered from emphysema as well.  The patient has never been seen by a lung doctor, herself.  She tells me that last week she was helping a friend clean off a boat and used some anti-mildew agents which produced a dry cough that was pretty severe for 1 day, but continues to linger.  Since she used this cleaning product, she has experienced chest tightness \"off-and-on\" for about 1 week.  She reports these episodes last 1 to 2 " "minutes and occur with both rest and exertion.  She notes that these episodes have been 1/10 on the pain scale.  She does tell me that within the past 2 weeks, she did experience chest pain while sitting in the kitchen.  This lasted for less than 5 minutes and was relieved with rest.  She does not rate on the pain scale, but characterizes the discomfort as \"tightness\".  She reports associated symptoms of diaphoresis, nausea, and lightheadedness.  She offers no other complaints or concerns at this time.    Past Cardiac Testin. Last Coronary Angio: None  2. Prior Stress Testing: None  3. Last Echo: 2021   1. Saline test results are positive with valsalva manuever for right to left atrial level shunt.   2. Estimated left ventricular EF = 60% Estimated left ventricular EF was in agreement with the calculated left ventricular EF. Left ventricular ejection fraction appears to be 56 - 60%. Left ventricular systolic function is normal.   3. Left ventricular diastolic function was normal.   4. Normal right ventricular cavity size, wall thickness, systolic function and septal motion noted.   5. No evidence of pulmonary hypertension is present.   6. There is no evidence of pericardial effusion.   7. No significant structural or functional valvular abnormality demonstrated.   8. Small patent foramen ovale present.  4. Prior Holter Monitor: None    Review of Systems:   Review of Systems   Constitutional: Negative for activity change, chills, diaphoresis, fatigue, fever and unexpected weight gain.   Eyes: Negative for blurred vision and visual disturbance.   Respiratory: Positive for cough, chest tightness and shortness of breath. Negative for apnea and wheezing.    Cardiovascular: Positive for chest pain. Negative for palpitations and leg swelling.   Gastrointestinal: Negative for abdominal distention, blood in stool, GERD and indigestion.   Endocrine: Negative for cold intolerance and heat intolerance. "   Genitourinary: Negative for hematuria.   Musculoskeletal: Negative for gait problem, joint swelling and myalgias.   Skin: Negative for color change, pallor and bruise.   Neurological: Positive for dizziness, syncope and light-headedness. Negative for seizures, weakness, numbness, headache and confusion.   Hematological: Does not bruise/bleed easily.        History of intracranial hemorrhage 2021   Psychiatric/Behavioral: Negative for behavioral problems, sleep disturbance, suicidal ideas and depressed mood.     I have reviewed and confirmed the accuracy of the ROS as documented by the MA/LPN/RN ANNE Dunn      Past Medical History:   Past Medical History:   Diagnosis Date   • Hypertension    • PFO (patent foramen ovale)    • Stroke (cerebrum) (CMS/[facility]) 2221       Past Surgical History:   Past Surgical History:   Procedure Laterality Date   •  SECTION     • GASTRIC SLEEVE LAPAROSCOPIC  2019       Family History:   Family History   Problem Relation Age of Onset   • Diabetes Father    • Heart disease Mother    • Diabetes Brother        Social History:   Social History     Socioeconomic History   • Marital status:      Spouse name: Not on file   • Number of children: Not on file   • Years of education: Not on file   • Highest education level: Not on file   Tobacco Use   • Smoking status: Former Smoker     Quit date: 2021     Years since quittin.4   • Smokeless tobacco: Never Used   Vaping Use   • Vaping Use: Never used   Substance and Sexual Activity   • Alcohol use: Yes     Comment: WEEKENDS   • Drug use: Never   • Sexual activity: Yes     Partners: Male     Birth control/protection: None, Post-menopausal       Medications:     Current Outpatient Medications:   •  amLODIPine (NORVASC) 5 MG tablet, Take 5 mg by mouth Daily., Disp: , Rfl:   •  busPIRone (BUSPAR) 10 MG tablet, Take 10 mg by mouth 2 (Two) Times a Day As Needed., Disp: , Rfl:   •  Chantix Starting Month  "Russell 0.5 MG X 11 & 1 MG X 42 tablet, Take  by mouth Daily., Disp: , Rfl:   •  citalopram (CeleXA) 40 MG tablet, Take 40 mg by mouth Daily., Disp: , Rfl:   •  levETIRAcetam (KEPPRA) 500 MG tablet, Take 1 tablet by mouth 2 (Two) Times a Day for 30 days., Disp: 60 tablet, Rfl: 0  •  multivitamin (MULTI VITAMIN DAILY PO), Take  by mouth Daily., Disp: , Rfl:   •  vitamin D (ERGOCALCIFEROL) 1.25 MG (02661 UT) capsule capsule, Take 50,000 Units by mouth 1 (One) Time Per Week., Disp: , Rfl:     Allergies:   No Known Allergies    Physical Exam:  Vital Signs:   Vitals:    06/28/21 1309 06/28/21 1313   BP: 106/88 118/84   BP Location: Left arm Right arm   Patient Position: Sitting Sitting   Cuff Size: Adult Adult   Pulse: 90    Resp: 18    SpO2: 95%    Weight: 68.5 kg (151 lb)    Height: 157.5 cm (62.01\")      Body mass index is 27.61 kg/m².    Physical Exam  Vitals and nursing note reviewed.   Constitutional:       General: She is not in acute distress.     Appearance: Normal appearance. She is well-developed. She is not ill-appearing or diaphoretic.   HENT:      Head: Normocephalic and atraumatic.   Eyes:      General: No scleral icterus.     Extraocular Movements: Extraocular movements intact.   Neck:      Trachea: Trachea normal.   Cardiovascular:      Rate and Rhythm: Normal rate and regular rhythm.      Pulses: Normal pulses.      Heart sounds: Normal heart sounds. No murmur heard.   No friction rub. No gallop.    Pulmonary:      Effort: Pulmonary effort is normal.      Breath sounds: No stridor. No wheezing, rhonchi or rales.      Comments: Decreased breath sounds throughout.  No cough observed.  Abdominal:      Palpations: Abdomen is soft.      Tenderness: There is no abdominal tenderness.   Musculoskeletal:         General: Normal range of motion.      Cervical back: Neck supple.      Right lower leg: No edema.      Left lower leg: No edema.   Skin:     General: Skin is warm and dry.      Findings: No bruising, lesion " "or rash.   Neurological:      Mental Status: She is alert and oriented to person, place, and time.      Motor: No weakness.      Gait: Gait normal.   Psychiatric:         Mood and Affect: Mood normal.         Speech: Speech is delayed.         Behavior: Behavior normal. Behavior is cooperative.         Thought Content: Thought content does not include suicidal ideation.         Cognition and Memory: Memory is impaired.       .  Results Review:   I reviewed the patient's new clinical results.      ECG 12 Lead    Date/Time: 2021 12:46 PM  Performed by: Ana Romero APRN  Authorized by: Ana Romero APRN   Comparison: compared with previous ECG from 2021  Rhythm: sinus rhythm  Rate: normal  BPM: 89  QRS axis: normal    Clinical impression: normal ECG        Assessment / Plan:     1. PFO with right-to-left atrial level shunt on Valsalva (Primary)  --\"Small\" patent foramen ovale per echocardiogram   --No evidence of atrial septal defect  --REFER to Dr. Rousseau for consideration of PFO closure    2. Chest pain, atypical  --Multiple risk factors including hyperlipidemia, borderline diabetes, hypertension, heavy tobacco use  --Mother  @ age 56 after reportedly suffering an MI  --Plan for treadmill stress test to rule out ischemia  --Follow-up with Dr. Summers in 4 weeks or sooner, if needed    3. Shortness of breath  --Multifactorial  --LVEF 60%  --No significant valvular disease or pulmonary hypertension  --Possible deconditioning due to recent weight gain  --Likely underlying lung disease considering her lengthy/heavy smoking history  --Consider pulmonary referral    4. Syncope  --ECG shows normal sinus rhythm today  --14-day Holter study to rule out underlying arrhythmia and ectopy  --Follow-up with Dr. Summers in 4 weeks or sooner if needed    5. Hypertension  --Acceptable blood pressure  --Continue amlodipine    6. Cerebrovascular accident (CVA)  --CVA 2021  --Left frontal lobe " hematoma without obvious contrast enhancement or solid   component. Additional small focus of hematoma in the right   parieto-occipital region without suspicious enhancement or solid   component. There is some mild surrounding edema with both of these   regions however no gross mass effect (per MRI 1/31/21)  --Left frontal Intracranial hemorrhage (per MRI on 3/29/21)  --Follow-up with neurology as scheduled    7. Hyperlipidemia  --Recent  mg/dL  --Patient declines statin therapy today    8. Tobacco abuse  --Strongly encouraged complete smoking cessation    Preventative Cardiology:   Tobacco Cessation: Cessation Counseling Provided    Advance Care Planning: ACP discussion was held with the patient during this visit. Patient does not have an advance directive, information provided.       Follow Up:   Return in about 4 weeks (around 7/26/2021) for Follow-up with Ragini Suárez before next visit.      Thank you for allowing me to participate in the care of your patient. Please to not hesitate to contact me with additional questions or concerns.     Ana Romero, ANNE

## 2021-07-26 ENCOUNTER — OFFICE VISIT (OUTPATIENT)
Dept: NEUROSURGERY | Facility: CLINIC | Age: 56
End: 2021-07-26

## 2021-07-26 ENCOUNTER — HOSPITAL ENCOUNTER (OUTPATIENT)
Dept: MRI IMAGING | Facility: HOSPITAL | Age: 56
Discharge: HOME | End: 2021-07-26
Admitting: NEUROLOGICAL SURGERY

## 2021-07-26 ENCOUNTER — PREP FOR SURGERY (OUTPATIENT)
Dept: OTHER | Facility: HOSPITAL | Age: 56
End: 2021-07-26

## 2021-07-26 VITALS — WEIGHT: 151 LBS | BODY MASS INDEX: 27.79 KG/M2 | HEIGHT: 62 IN

## 2021-07-26 DIAGNOSIS — I61.1 NONTRAUMATIC CORTICAL HEMORRHAGE OF CEREBRAL HEMISPHERE, UNSPECIFIED LATERALITY (HCC): ICD-10-CM

## 2021-07-26 DIAGNOSIS — I61.1 NONTRAUMATIC CORTICAL HEMORRHAGE OF CEREBRAL HEMISPHERE, UNSPECIFIED LATERALITY (HCC): Primary | ICD-10-CM

## 2021-07-26 PROCEDURE — A9577 INJ MULTIHANCE: HCPCS | Performed by: NEUROLOGICAL SURGERY

## 2021-07-26 PROCEDURE — 82565 ASSAY OF CREATININE: CPT

## 2021-07-26 PROCEDURE — 99212 OFFICE O/P EST SF 10 MIN: CPT | Performed by: NEUROLOGICAL SURGERY

## 2021-07-26 PROCEDURE — 0 GADOBENATE DIMEGLUMINE 529 MG/ML SOLUTION: Performed by: NEUROLOGICAL SURGERY

## 2021-07-26 PROCEDURE — 70553 MRI BRAIN STEM W/O & W/DYE: CPT

## 2021-07-26 RX ADMIN — GADOBENATE DIMEGLUMINE 14 ML: 529 INJECTION, SOLUTION INTRAVENOUS at 15:11

## 2021-07-26 NOTE — PROGRESS NOTES
You have chosen to receive care through a telephone visit. Do you consent to use a telephone visit for your medical care today? Yes    NAME: MONI WILCOX   DOS: 2021  : 1965  PCP: Maggi Talamantes APRN    Chief Complaint:    Chief Complaint   Patient presents with   • ICH f/u       History of Present Illness:  55 y.o. female   This a 55-year-old lady who is presenting to the hospital with acute Covid infection and had multiple intracranial hemorrhages she is doing well she denies any symptoms other than short-term memory issues    PMHX  Allergies:  No Known Allergies  Medications    Current Outpatient Medications:   •  amLODIPine (NORVASC) 5 MG tablet, Take 5 mg by mouth Daily., Disp: , Rfl:   •  busPIRone (BUSPAR) 10 MG tablet, Take 10 mg by mouth 2 (Two) Times a Day As Needed., Disp: , Rfl:   •  Chantix Starting Month Russell 0.5 MG X 11 & 1 MG X 42 tablet, Take  by mouth Daily., Disp: , Rfl:   •  citalopram (CeleXA) 40 MG tablet, Take 40 mg by mouth Daily., Disp: , Rfl:   •  multivitamin (MULTI VITAMIN DAILY PO), Take  by mouth Daily., Disp: , Rfl:   •  vitamin D (ERGOCALCIFEROL) 1.25 MG (06118 UT) capsule capsule, Take 50,000 Units by mouth 1 (One) Time Per Week., Disp: , Rfl:   •  levETIRAcetam (KEPPRA) 500 MG tablet, Take 1 tablet by mouth 2 (Two) Times a Day for 30 days., Disp: 60 tablet, Rfl: 0  No current facility-administered medications for this visit.  Past Medical History:  Past Medical History:   Diagnosis Date   • Hypertension    • PFO (patent foramen ovale)    • Stroke (cerebrum) (CMS/HCC) 2221     Past Surgical History:  Past Surgical History:   Procedure Laterality Date   •  SECTION     • GASTRIC SLEEVE LAPAROSCOPIC  2019     Social Hx:  Social History     Tobacco Use   • Smoking status: Former Smoker     Quit date: 2021     Years since quittin.4   • Smokeless tobacco: Never Used   Vaping Use   • Vaping Use: Never used   Substance Use Topics   • Alcohol use: Yes      Comment: WEEKENDS   • Drug use: Never     Family Hx:  Family History   Problem Relation Age of Onset   • Diabetes Father    • Heart disease Mother    • Diabetes Brother      Review of Systems:        Review of Systems     Reports that she can move her arms and legs insert review of systems    Physical Examination:  There were no vitals filed for this visit.   General Appearance:   Well developed, well nourished, well groomed, alert, and cooperative.  Neurological examination:  Neurologic Exam  Stable exam fluent speech    Review of Imaging/DATA:  MRI was personally reviewed compared to her prior studies shows expected evolution of this left subcortical hemorrhage that I suspect was from a cortical venous infarction stable GRE imaging and postcontrast sequences demonstrate no evidence of convincing underlying mass lesion  Diagnoses/Plan:    Ms. Calderon is a 55 y.o. female   There is a 55-year-old status post acute Covid infection with a secondary intracranial hemorrhage she was quite ill.  She underwent conservative management    From a neurosurgical standpoint I see nothing that should require surgery I do not see underlying mass-effect lesion such as tumors etc.    I will set her up a follow-up with neurology to ensure that she can have ongoing antiepileptic-seizure-in follow-up for some of her short-term memory dysfunction.  We will see her back as needed    I spent 10 minutes on the phone with her

## 2021-08-01 LAB — CREAT BLDA-MCNC: 0.8 MG/DL (ref 0.6–1.3)

## 2021-08-02 ENCOUNTER — HOSPITAL ENCOUNTER (OUTPATIENT)
Dept: CARDIOLOGY | Facility: HOSPITAL | Age: 56
Discharge: HOME | End: 2021-08-02
Admitting: NURSE PRACTITIONER

## 2021-08-02 VITALS — HEIGHT: 62 IN | WEIGHT: 151.01 LBS | BODY MASS INDEX: 27.79 KG/M2

## 2021-08-02 DIAGNOSIS — Q21.12 PFO (PATENT FORAMEN OVALE): ICD-10-CM

## 2021-08-02 LAB
MAXIMAL PREDICTED HEART RATE: 164 BPM
STRESS TARGET HR: 139 BPM

## 2021-08-02 PROCEDURE — 93893 TCD STD ICR ART VEN-ART SHNT: CPT

## 2021-08-05 PROBLEM — R55 SYNCOPE AND COLLAPSE: Status: ACTIVE | Noted: 2021-08-05

## 2021-08-05 NOTE — PROGRESS NOTES
[facility]  Cardiology Consultation Note     Arlene Calderon  1965  Requesting Provider: DENA Dunn*  PCP: Maggi Talamantes APRN    ID:  Arlene Calderon is a 55 y.o. female who resides in [place], [place]    REASON FOR CONSULTATION:    • PFO         Dear Ana Romero:    Thank you for referring Arlene to my office today for consideration of PFO closure.  Arlene is a 55-year-old female who in 2021 was admitted to [facility] with acute intracerebral hemorrhage in the setting of COVID-19 infection.  She was evaluated by Dr. Yoav Villa and no surgery was indicated.  As part of the work-up, she underwent an echocardiogram which revealed a small PFO noted with Valsalva.      Past Medical History, Past Surgical History, Family history, Social History, and Medications were all reviewed with the patient today and updated as necessary.       Current Outpatient Medications:   •  amLODIPine (NORVASC) 5 MG tablet, Take 10 mg by mouth Daily., Disp: , Rfl:   •  busPIRone (BUSPAR) 10 MG tablet, Take 10 mg by mouth 2 (Two) Times a Day As Needed., Disp: , Rfl:   •  citalopram (CeleXA) 40 MG tablet, Take 40 mg by mouth Daily., Disp: , Rfl:   •  levETIRAcetam (KEPPRA) 500 MG tablet, Take 1 tablet by mouth 2 (Two) Times a Day for 30 days., Disp: 60 tablet, Rfl: 0  •  multivitamin (MULTI VITAMIN DAILY PO), Take  by mouth Daily., Disp: , Rfl:   •  vitamin D (ERGOCALCIFEROL) 1.25 MG (92450 UT) capsule capsule, Take 50,000 Units by mouth 1 (One) Time Per Week., Disp: , Rfl:     No Known Allergies      Past Medical History:   Diagnosis Date   • COVID-19 virus detected at admission 2021   • Hypertension    • PFO (patent foramen ovale)    • Stroke (cerebrum) (CMS/HCC) 2221       Past Surgical History:   Procedure Laterality Date   •  SECTION     • GASTRIC SLEEVE LAPAROSCOPIC  2019       Family History   Problem Relation Age of Onset   •  "Diabetes Father    • Heart disease Mother    • Diabetes Brother        Social History     Tobacco Use   • Smoking status: Current Every Day Smoker   • Smokeless tobacco: Never Used   Substance Use Topics   • Alcohol use: Yes     Comment: WEEKENDS       Review of Systems   All other systems reviewed and are negative.              /80 (BP Location: Right arm, Patient Position: Sitting)   Pulse 94   Ht 154.9 cm (61\")   Wt 68 kg (150 lb)   SpO2 95%   BMI 28.34 kg/m²        Constitutional:       Appearance: Healthy appearance. Well-developed.   Eyes:      General: Lids are normal. No scleral icterus.     Conjunctiva/sclera: Conjunctivae normal.   HENT:      Head: Normocephalic and atraumatic.   Neck:      Thyroid: No thyromegaly.      Vascular: No carotid bruit or JVD.   Pulmonary:      Effort: Pulmonary effort is normal.      Breath sounds: Normal breath sounds. No wheezing. No rhonchi. No rales.   Cardiovascular:      Normal rate. Regular rhythm.      Murmurs: There is no murmur.      No gallop. No rub.   Pulses:     Intact distal pulses.   Abdominal:      General: There is no distension.      Palpations: Abdomen is soft. There is no abdominal mass.   Musculoskeletal:      Cervical back: Normal range of motion. Skin:     General: Skin is warm and dry.      Findings: No rash.   Neurological:      General: No focal deficit present.      Mental Status: Alert and oriented to person, place, and time.      Gait: Gait is intact.   Psychiatric:         Attention and Perception: Attention normal.         Mood and Affect: Mood normal.         Behavior: Behavior normal.             ECG 12 Lead    Date/Time: 8/6/2021 12:39 PM  Performed by: Bryson Rousseau IV, MD  Authorized by: Bryson Rousseau IV, MD   Comparison: compared with previous ECG from 7/15/2021  Similar to previous ECG  Rhythm: sinus rhythm  BPM: 90              Lab Results   Component Value Date    CHOL 210 (H) 01/30/2021    HDL 58 " 01/30/2021     (H) 01/30/2021    VLDL 14 01/30/2021     Lab date: 2/4/2021  • BMP: Glu 101, BUN 16, Creat 0.70, eGFR 87, Na 143, K 4.3, Cl 105, CO2 32, Ca 9.6  • CBC: WBC 8.96, RBC 5.75, HGB 17, HCT 51.8, MCV 90.1, MCH 29.6,            Problem List Items Addressed This Visit        Cardiology Problems    PFO (patent foramen ovale) - Primary (Chronic)    Overview     · Echo (2/2/2021): EF 56-60%. Saline test positive with valsalva manuever for right to left atrial level shunt. Small patent foramen ovale present.         Current Assessment & Plan     · Recent intracranial hemorrhage in the setting of COVID-19 infection  · No prior stroke symptoms to suggest paradoxical embolism in the past  · Given proximity of her neurologic event with Covid infection, my suspicion of paradoxical embolism as a cause of her ICH is low  · Will discuss further with Dr. Villa, but for now would defer PFO closure unless recurrent neurologic symptoms suggestive of paradoxical embolism         Coronary artery disease involving native coronary artery of native heart with angina pectoris (CMS/HCC) (Chronic)    Overview     · Coronary calcification noted on CT chest 1/2021         Current Assessment & Plan     · No angina  · Ischemic work-up being performed in Dr. Summers's office  · Antiplatelet therapy would normally be indicated but should be discussed with Dr. Villa given her history of ICH         Essential hypertension (Chronic)    Overview     • Target blood pressure <130/80 mmHg         Current Assessment & Plan     · Reasonably controlled  · Continue present medical         Hyperlipidemia LDL goal <70    Overview     • High intensity statin therapy indicated given the presence of CAD         Current Assessment & Plan     · Hi intensive statin therapy indicated the presence of CAD  · Defer treatment to Dr. Summers, patient's primary cardiology                        · PFO closure not recommended at this time  · Ischemic  evaluation and treatment of CAD per Dr. Kristopher PIERRE. Juan Rousseau MD, [facility], [facility]  Interventional Cardiology  08/06/21  12:43 EDT

## 2021-08-06 ENCOUNTER — OFFICE VISIT (OUTPATIENT)
Dept: CARDIOLOGY | Facility: CLINIC | Age: 56
End: 2021-08-06

## 2021-08-06 VITALS
HEIGHT: 61 IN | DIASTOLIC BLOOD PRESSURE: 80 MMHG | HEART RATE: 94 BPM | BODY MASS INDEX: 28.32 KG/M2 | OXYGEN SATURATION: 95 % | WEIGHT: 150 LBS | SYSTOLIC BLOOD PRESSURE: 134 MMHG

## 2021-08-06 DIAGNOSIS — I10 ESSENTIAL HYPERTENSION: ICD-10-CM

## 2021-08-06 DIAGNOSIS — Q21.12 PFO (PATENT FORAMEN OVALE): Primary | Chronic | ICD-10-CM

## 2021-08-06 DIAGNOSIS — I25.119 CORONARY ARTERY DISEASE INVOLVING NATIVE CORONARY ARTERY OF NATIVE HEART WITH ANGINA PECTORIS (HCC): ICD-10-CM

## 2021-08-06 DIAGNOSIS — E78.5 HYPERLIPIDEMIA LDL GOAL <70: ICD-10-CM

## 2021-08-06 PROBLEM — U07.1 COVID-19 VIRUS DETECTED: Status: RESOLVED | Noted: 2021-01-30 | Resolved: 2021-08-06

## 2021-08-06 PROCEDURE — 99214 OFFICE O/P EST MOD 30 MIN: CPT | Performed by: INTERNAL MEDICINE

## 2021-08-06 PROCEDURE — 93000 ELECTROCARDIOGRAM COMPLETE: CPT | Performed by: INTERNAL MEDICINE

## 2021-08-06 NOTE — ASSESSMENT & PLAN NOTE
· Hi intensive statin therapy indicated the presence of CAD  · Defer treatment to Dr. Summers, patient's primary cardiology

## 2021-08-06 NOTE — ASSESSMENT & PLAN NOTE
· Recent intracranial hemorrhage in the setting of COVID-19 infection  · No prior stroke symptoms to suggest paradoxical embolism in the past  · Given proximity of her neurologic event with Covid infection, my suspicion of paradoxical embolism as a cause of her ICH is low  · Will discuss further with Dr. Villa, but for now would defer PFO closure unless recurrent neurologic symptoms suggestive of paradoxical embolism

## 2021-08-06 NOTE — ASSESSMENT & PLAN NOTE
· No angina  · Ischemic work-up being performed in Dr. Summers's office  · Antiplatelet therapy would normally be indicated but should be discussed with Dr. Villa given her history of ICH

## 2021-08-26 ENCOUNTER — OFFICE VISIT (OUTPATIENT)
Dept: CARDIOLOGY | Facility: CLINIC | Age: 56
End: 2021-08-26

## 2021-08-26 VITALS
HEIGHT: 61 IN | RESPIRATION RATE: 16 BRPM | OXYGEN SATURATION: 98 % | DIASTOLIC BLOOD PRESSURE: 86 MMHG | WEIGHT: 152 LBS | BODY MASS INDEX: 28.7 KG/M2 | HEART RATE: 88 BPM | SYSTOLIC BLOOD PRESSURE: 118 MMHG

## 2021-08-26 DIAGNOSIS — Q21.12 PFO (PATENT FORAMEN OVALE): Chronic | ICD-10-CM

## 2021-08-26 DIAGNOSIS — R07.9 CHEST PAIN, UNSPECIFIED TYPE: Primary | ICD-10-CM

## 2021-08-26 DIAGNOSIS — E78.5 HYPERLIPIDEMIA LDL GOAL <70: ICD-10-CM

## 2021-08-26 DIAGNOSIS — I10 ESSENTIAL HYPERTENSION: Chronic | ICD-10-CM

## 2021-08-26 PROBLEM — R55 SYNCOPE AND COLLAPSE: Status: RESOLVED | Noted: 2021-08-05 | Resolved: 2021-08-26

## 2021-08-26 PROBLEM — I25.119 CORONARY ARTERY DISEASE INVOLVING NATIVE CORONARY ARTERY OF NATIVE HEART WITH ANGINA PECTORIS (HCC): Chronic | Status: RESOLVED | Noted: 2021-08-06 | Resolved: 2021-08-26

## 2021-08-26 PROCEDURE — 99214 OFFICE O/P EST MOD 30 MIN: CPT | Performed by: INTERNAL MEDICINE

## 2021-08-26 NOTE — PROGRESS NOTES
[facility] Cardiology Office Follow Up Note    Arlene Calderon  [id number]  2021    Primary Care Provider: Maggi Talamantes APRN    Chief Complaint: Follow-up after cardiac testing    History of Present Illness:   Mrs. Arlene Calderon is a 55 y.o. female who presents to the Cardiology Clinic for follow-up after cardiac testing.  The patient has a past medical history of hypertension, and tobacco use with recent cessation.   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  She has a past cardiac history is significant for a small PFO noted at the time of her work-up for her intracranial hemorrhage.  She presents to cardiology clinic today for routine follow-up.  Since her last appointment, the patient reports she is working on tobacco cessation.  She denies chest pain or chest discomfort.  She does have mild shortness of breath, which she believes is likely related to her history of tobacco use.  No significant palpitations.  No history of orthopnea, PND, or lower extremity swelling.  She reports she continues to have difficulty with short-term memory following her intracranial hemorrhage.  No other specific complaints at this time.    Past Cardiac Testin. Last Coronary Angio: None  2. Prior Stress Testing:  GXT 2021   1.  No evidence of inducible ischemia  3. Last Echo: 2021          1. Saline test results are positive with valsalva manuever for right to left atrial level shunt.          2. Estimated left ventricular EF = 60% Estimated left ventricular EF was in agreement with the calculated left ventricular EF. Left ventricular ejection fraction appears to be 56 - 60%. Left ventricular systolic function is normal.          3. Left ventricular diastolic function was normal.          4. Normal right ventricular cavity size, wall thickness, systolic  function and septal motion noted.    5. No evidence of pulmonary hypertension is present.    6. There is no evidence of pericardial effusion.          7. No significant structural or functional valvular abnormality demonstrated.          8. Small patent foramen ovale present.  4. Prior Holter Monitor:  7/26/2021   1.  Normal study    Review of Systems:   Review of Systems   Constitutional: Negative for activity change, appetite change, chills, diaphoresis, fatigue, fever, unexpected weight gain and unexpected weight loss.   Eyes: Negative for blurred vision and double vision.   Respiratory: Positive for shortness of breath. Negative for cough, chest tightness and wheezing.    Cardiovascular: Negative for chest pain, palpitations and leg swelling.   Gastrointestinal: Negative for abdominal pain, anal bleeding, blood in stool and GERD.   Endocrine: Negative for cold intolerance and heat intolerance.   Genitourinary: Negative for hematuria.   Neurological: Positive for memory problem. Negative for dizziness, syncope, weakness and light-headedness.   Hematological: Does not bruise/bleed easily.   Psychiatric/Behavioral: Negative for depressed mood and stress. The patient is not nervous/anxious.        I have reviewed and/or updated the patient's past medical, past surgical, family, social history, problem list and allergies as appropriate.     Medications:     Current Outpatient Medications:   •  amLODIPine (NORVASC) 5 MG tablet, Take 5 mg by mouth Daily., Disp: , Rfl:   •  busPIRone (BUSPAR) 10 MG tablet, Take 10 mg by mouth 2 (Two) Times a Day As Needed., Disp: , Rfl:   •  citalopram (CeleXA) 40 MG tablet, Take 40 mg by mouth Daily., Disp: , Rfl:   •  multivitamin (MULTI VITAMIN DAILY PO), Take  by mouth Daily., Disp: , Rfl:   •  vitamin D (ERGOCALCIFEROL) 1.25 MG (96346 UT) capsule capsule, Take 50,000 Units by mouth 1 (One) Time Per Week., Disp: , Rfl:   •  levETIRAcetam (KEPPRA) 500 MG tablet, Take 1 tablet by  "mouth 2 (Two) Times a Day for 30 days., Disp: 60 tablet, Rfl: 0    Physical Exam:  Vital Signs:   Vitals:    08/26/21 1313   BP: 118/86   BP Location: Left arm   Patient Position: Sitting   Pulse: 88   Resp: 16   SpO2: 98%   Weight: 68.9 kg (152 lb)   Height: 154.9 cm (61\")       Physical Exam  Constitutional:       General: She is not in acute distress.     Appearance: Normal appearance. She is well-developed. She is not diaphoretic.   HENT:      Head: Normocephalic and atraumatic.   Eyes:      General: No scleral icterus.     Pupils: Pupils are equal, round, and reactive to light.   Neck:      Trachea: No tracheal deviation.   Cardiovascular:      Rate and Rhythm: Normal rate and regular rhythm.      Heart sounds: Normal heart sounds. No murmur heard.   No friction rub. No gallop.       Comments: Normal JVD.  Pulmonary:      Effort: Pulmonary effort is normal. No respiratory distress.      Breath sounds: Normal breath sounds. No stridor. No wheezing or rales.   Chest:      Chest wall: No tenderness.   Abdominal:      General: Bowel sounds are normal. There is no distension.      Palpations: Abdomen is soft.      Tenderness: There is no abdominal tenderness. There is no guarding or rebound.   Musculoskeletal:         General: No swelling. Normal range of motion.      Cervical back: Neck supple. No tenderness.   Lymphadenopathy:      Cervical: No cervical adenopathy.   Skin:     General: Skin is warm and dry.      Findings: No erythema.      Comments: Has nicotine patch in place   Neurological:      General: No focal deficit present.      Mental Status: She is alert and oriented to person, place, and time.   Psychiatric:         Mood and Affect: Mood normal.         Behavior: Behavior normal.         Results Review:   I reviewed the patient's new clinical results.        Assessment / Plan:     1.  Shortness of breath  --History of mild dyspnea, in the setting of chronic tobacco use  --Prior echocardiogram showed " normal LV systolic function and no significant valvular abnormalities.  Small PFO, likely not contributing to shortness of breath  --GXT today without evidence of inducible ischemia  --Given general unremarkable echocardiogram and normal GXT, suspect shortness of breath likely related to emphysema in the setting of chronic tobacco use  --No indication for further cardiac work-up at this time    2.  PFO  --Small PFO, with shunting noted only during Valsalva  --Likely did not contribute to prior intracranial hemorrhage  --No indication for closure  --Would consider aspirin 81 mg daily, however will defer to neurology given history of intracranial hemorrhage    3.  History of intracranial hemorrhage  --Suspected to be secondary to venous infarct in the setting of acute COVID-19 infection  --Previously evaluated by neurosurgery and neurology    4.  Hyperlipidemia  --LDL mildly elevated at 138  --Advised lifestyle modifications through diet and exercise  --If the lipid profile worsening, would start statin    5.  Hypertension  --Currently well controlled        Follow Up:   Return in about 1 year (around 8/26/2022).      Thank you for allowing me to participate in the care of your patient. Please to not hesitate to contact me with additional questions or concerns.     MO Summers MD  Interventional Cardiology   08/26/2021  13:20 EDT

## 2021-09-23 ENCOUNTER — OFFICE VISIT (OUTPATIENT)
Dept: NEUROLOGY | Facility: CLINIC | Age: 56
End: 2021-09-23

## 2021-09-23 VITALS
WEIGHT: 152.2 LBS | BODY MASS INDEX: 28.74 KG/M2 | HEART RATE: 69 BPM | SYSTOLIC BLOOD PRESSURE: 120 MMHG | DIASTOLIC BLOOD PRESSURE: 80 MMHG | TEMPERATURE: 97.1 F | HEIGHT: 61 IN | OXYGEN SATURATION: 100 %

## 2021-09-23 DIAGNOSIS — R47.9 SPEECH DISTURBANCE, UNSPECIFIED TYPE: ICD-10-CM

## 2021-09-23 DIAGNOSIS — I61.0 NONTRAUMATIC SUBCORTICAL HEMORRHAGE OF LEFT CEREBRAL HEMISPHERE (HCC): Primary | ICD-10-CM

## 2021-09-23 PROCEDURE — 99213 OFFICE O/P EST LOW 20 MIN: CPT | Performed by: NURSE PRACTITIONER

## 2021-09-23 NOTE — PROGRESS NOTES
Follow Up Neurology Office Visit      Patient Name: Arlene Calderon    Referring Physician: No ref. provider found    Chief Complaint:    Chief Complaint   Patient presents with   • Follow-up     patient in office to follow up on post stroke.        History of Present Illness: Arlene Calderon is a 56 y.o. female who is here to follow up with Neurology after suffering a stroke.   January 2021  Continues to complain of WFD, admits to depression/anxiety. She is seeing a therapist for depression and anxiety, is taking BuSpar and Celexa.    She endorses trouble with both fluency and anomia, but more bothered by anomia.     The following portions of the patient's history were reviewed and updated as appropriate: allergies, current medications, past family history, past medical history, past social history, past surgical history and problem list.    Subjective     Review of Systems:   Review of Systems   Constitutional: Negative for activity change and fatigue.   HENT: Negative for drooling and voice change.    Eyes: Negative for blurred vision, double vision, photophobia and visual disturbance.   Respiratory: Negative for shortness of breath.    Cardiovascular: Negative for chest pain and palpitations.   Gastrointestinal: Negative for nausea and vomiting.   Genitourinary: Negative for urinary incontinence.   Musculoskeletal: Negative for arthralgias, back pain, gait problem, myalgias and neck pain.   Allergic/Immunologic: Negative for immunocompromised state.   Neurological: Negative for dizziness, tremors, seizures, syncope, facial asymmetry, speech difficulty, weakness, light-headedness, numbness, headache, memory problem and confusion.   Hematological: Does not bruise/bleed easily.   Psychiatric/Behavioral: Negative for decreased concentration, dysphoric mood, hallucinations, sleep disturbance, depressed mood and stress. The patient is not nervous/anxious.      Medications:     Current Outpatient  "Medications:   •  amLODIPine (NORVASC) 5 MG tablet, Take 5 mg by mouth Daily., Disp: , Rfl:   •  busPIRone (BUSPAR) 10 MG tablet, Take 10 mg by mouth 2 (Two) Times a Day As Needed., Disp: , Rfl:   •  citalopram (CeleXA) 40 MG tablet, Take 40 mg by mouth Daily., Disp: , Rfl:   •  multivitamin (MULTI VITAMIN DAILY PO), Take  by mouth Daily., Disp: , Rfl:   •  varenicline (Chantix Starting Month Pak) 0.5 MG X 11 & 1 MG X 42 tablet, Take 0.5 mg by mouth daily on days 1-3, then 0.5 mg twice daily on days 4-7, then 1 mg twice daily, Disp: 53 tablet, Rfl: 0  •  vitamin D (ERGOCALCIFEROL) 1.25 MG (22051 UT) capsule capsule, Take 50,000 Units by mouth 1 (One) Time Per Week., Disp: , Rfl:   •  levETIRAcetam (KEPPRA) 500 MG tablet, Take 1 tablet by mouth 2 (Two) Times a Day for 30 days., Disp: 60 tablet, Rfl: 0    Allergies:   No Known Allergies    Objective     Physical Exam:  Vital Signs:   Vitals:    09/23/21 1031   BP: 120/80   BP Location: Left arm   Patient Position: Sitting   Cuff Size: Adult   Pulse: 69   Temp: 97.1 °F (36.2 °C)   SpO2: 100%   Weight: 69 kg (152 lb 3.2 oz)   Height: 154.9 cm (61\")   PainSc: 0-No pain       Physical Exam  Vitals and nursing note reviewed.   Pulmonary:      Effort: Pulmonary effort is normal.   Neurological:      Mental Status: She is oriented to person, place, and time. Mental status is at baseline.      Gait: Gait is intact.   Psychiatric:         Speech: Speech normal.         Behavior: Behavior normal.         Thought Content: Thought content normal.         Judgment: Judgment normal.         Neurologic Exam     Mental Status   Oriented to person, place, and time.   Attention: normal. Concentration: normal.   Speech: speech is normal   Level of consciousness: alert    Cranial Nerves   Cranial nerves II through XII intact.     CN II   Visual acuity: normal with correction    Motor Exam   Muscle bulk: normal  Overall muscle tone: normal    Gait, Coordination, and Reflexes "     Gait  Gait: normal     MRI BRAIN W WO CONTRAST-07/26/2021:     INDICATION: ICH; I61.1-Nontraumatic intracerebral hemorrhage in  hemisphere, cortical, history of hemorrhage, follow-up.     TECHNIQUE: Routine multiplanar imaging was obtained of the brain with  and without the administration of Gadolinium contrast.     COMPARISON: 03/29/2021.     FINDINGS: No evidence of restricted diffusion to suggest evidence of an  acute ischemic insult. Flow voids are preserved in the major  intracranial vessels. The visualized paranasal sinuses are grossly  clear. Globes and orbits are intact. The brain parenchyma is  nonsignificantly changed with evolving left frontal hemorrhage. There is  no significant change in size or appearance of the area when compared to  the prior examination. There is a tiny area of increased signal seen  posteriorly within the right parietal lobe. This is continuing to  decrease in size when compared to the prior study. No new areas of  hemorrhage. Some atrophy identified of the brain with no evidence of  abnormal contrast enhancement. The visualized vascularity is  unremarkable. Pituitary and sella are unremarkable. Craniovertebral  junction is preserved. No cerebellopontine angle mass identified.      IMPRESSION:  Evolving area of prior hemorrhage in the left frontal  parenchyma with mild chronic changes seen within the periventricular and  subcortical white matter, and atrophy seen of the brain. Continued  decrease seen in size of the abnormal signal demonstrating the tiny old  hemorrhage in the right parietal lobe posteriorly.    Results Review:   I reviewed the patient's new clinical results.  I have reviewed the patient's other medical records to include, labs, radiology and referrals.     Assessment / Plan      Assessment/Plan:   Diagnoses and all orders for this visit:    1. 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 Speech disturbance, unspecified  type  -     Ambulatory Referral to Speech Therapy    Patient remained stable following left frontal ICH in January of this year.  She does continue to have difficulty with speech patterns, although she is fluent and appropriate in office today.  She does endorse significant difficulty with depression and anxiety, feels that this may be contributing to her symptoms and I agree, we discussed this today and emotional support provided.  Screen for PBA using CNS LS next visit if no improvement in depressive symptoms.  Referral placed to speech therapy for further evaluation and treatment.    Follow Up:   Return in about 3 months (around 12/23/2021) for Next scheduled follow up.       [place] APRLA NENA  [facility]   AS THE PROVIDER, I PERSONALLY WORE PPE DURING ENTIRE FACE TO FACE ENCOUNTER IN CLINIC WITH THE PATIENT. PATIENT ALSO WORE PPE DURING ENTIRE FACE TO FACE ENCOUNTER EXCEPT FOR A MAX OF 30 SECONDS DURING NEUROLOGICAL EVALUATION OF CRANIAL NERVES AND THEN MASK WAS PLACED BACK OVER PATIENT FACE FOR REMAINDER OF VISIT. I WASHED MY HANDS BEFORE AND AFTER VISIT.    Please note that portions of this note may have been completed with a voice recognition program. Efforts were made to edit the dictations, but occasionally words are mistranscribed.

## 2021-09-23 NOTE — PATIENT INSTRUCTIONS
Emotional Health After Stroke  Your emotional health may change after a stroke. You may have fear, anxiety, anger, sadness, and other feelings. Some of these changes happen because a stroke can damage your brain and nervous system. You may also have these feelings because coping with a change in your health can feel overwhelming.  Depression and other emotional changes can slow your recovery after a stroke. It is important to recognize the symptoms so that you can take steps to strengthen your emotional health.  What are some common emotions after a stroke?  You may have:  · Fear.  · Anxiety.  · Anger.  · Frustration.  · Sadness.  · Feelings of loss or grief.  · Depression.  · Crying or laughing at the wrong time or wrong situation (pseudobulbar affect, orPBA).  What are the symptoms of depression?  Depression after a stroke can happen right away, or it can show up later. Symptoms of depression may include:  · Sleep problems.  · Changes in normal eating habits, such as eating too much or too little.  · Weight gain or weight loss.  · Not having energy or enthusiasm (lethargy).  · Feeling very tired (fatigue).  · Avoiding people and activities (social withdrawal).  · Irritability.  · Crying more than usual.  · Mood swings.  · Not being able to concentrate.  · Feeling hopeless.  · Hating yourself.  · Having suicidal thoughts.  If you ever feel like you may hurt yourself or others, or have thoughts about taking your own life, get help right away. You can go to your nearest emergency department or call:  · Your local emergency services (911 in the [place]  · A suicide crisis helpline, such as the National Suicide Prevention Lifeline at 1-735.862.3275. This is open 24 hours a day.  What increases my risk of depression?  Having a stroke raises the risk of depression. The risk also goes up if you:  · Are socially isolated.  · Have a family history of depression.  · Have a history of depression or other mental health problems  before the stroke.  · Are unable to work or do activities that you previously enjoyed.  · Need help from others for daily activities.  · Use drugs or drink alcohol.  · Take certain medicines, such as sleeping pills or high blood pressure medicines.  What are some coping methods I can use?  Ask your health care provider for help. Your health care provider may recommend treatments for depression, such as:  · Talk therapy or counseling with a mental health professional. This may include cognitive behavioral therapy (CBT) to help change your patterns of thinking.  · Medical therapies, such as brain stimulation or light therapy.  · Lifestyle changes, such as eating a healthy diet and avoiding alcohol.  · Antidepressant medicines.  · Alternative therapies, such as acupuncture, music therapy, or pet therapy.  Other coping strategies you may try include:  · Physical therapy or exercises. Try to do some exercise every day.  · Writing your thoughts in a journal. An example might be keeping track of unrealistic thoughts or keeping a log of things you are thankful for.  · Practicing good sleep habits, such as getting up at the same time every day.  · Following a predictable routine each day.  · Participating in activities that make you laugh.  · Mindfulness therapy. This may include meditation and other techniques to lower your stress.  · Joining a support group for people who are recovering from a stroke. These groups provide social interaction and help you feel connected to others. Your health care team can help you find a support group in your area.  Summary  · Your emotional health may change after a stroke. You may have fear, anxiety, anger, sadness, and other feelings.  · It is important to recognize the symptoms of depression and other emotional problems.  · If you experience emotional changes, let your loved ones know and contact your health care provider.  This information is not intended to replace advice given to you  by your health care provider. Make sure you discuss any questions you have with your health care provider.  Document Revised: 11/30/2018 Document Reviewed: 03/23/2018  Elsevier Patient Education © 2021 [employer] Inc.    Speech-Language Therapy After a Stroke  You may have many physical changes after a stroke, and some of them may affect your ability to communicate. You may have problems talking, putting your thoughts into words, or using and understanding words. Speech-language therapy is a common treatment after a stroke.  How are speech and language affected by a stroke?  A stroke can damage your brain and nervous system. In most people, the left side of the brain controls language and speech, so damage to that area can affect those functions. You may have problems with:  · Understanding spoken or written words.  · Sharing your thoughts by talking. You may have trouble:  ? Speaking clearly.  ? Saying what you mean to say.  What is aphasia?  Aphasia is a condition that affects your ability to communicate with others. A stroke often causes aphasia because of damage to the left side of the brain. This is typically the side that controls the ability to talk and understand language. Symptoms of aphasia include:  · Struggling to think of words and names of people, places, and objects.  · Using the wrong words while talking.  · Making up new words (without knowing it) that do not make sense to other people.  · Problems putting words together into a sentence.  · Difficulty understanding others while they talk.  · Problems with listening, reading, writing, using numbers, or doing math.  How can therapy help?  Speech-language therapy is a common treatment during stroke recovery. It may include doing communication activities, exercising your speech muscles, and practicing speech. It may help you:  · Learn to talk and communicate again.  · Have conversations.  · Use the right words to express ideas.  · Put words together into  "sentences.  · Learn to speak more clearly so others can understand you.  · Use gestures, sign language, symbols, or other methods to express yourself (aided communication). These can be used in place of speech or to add to what you are able to say. Some aided communication may involve use of electronic devices.  When will therapy start and where will I have therapy?  Your health care provider will decide when it is best for you to start therapy. Some people start rehabilitation, including speech-language therapy, as soon as they are medically stable. This may be 24-48 hours after a stroke.  Rehabilitation can take place in a few different places, based on your needs. It may take place in:  · The hospital or an in-patient rehabilitation hospital.  · An outpatient rehabilitation facility.  · A long-term care facility.  · A community rehabilitation clinic.  · Your home.  How can my family and friends support my recovery?  Your family and friends can help by:  · Being open about your problems.  · Creating or modifying daily routines to make talking and communicating easier.  · Lowering background noise.  · Being patient when you are trying to express your thoughts or understand other people.  · Getting your attention before talking to you.  · Using short and simple sentences and giving you extra time to talk or to respond to questions.  · Talking to you in a normal voice.  · Keeping eye contact with you during conversation.  · Paying attention to your body language.  · Using pictures, written words, or symbols to help you understand.  · Helping you to use aided communication.  · Asking \"yes\" or \"no\" questions.  · Praising your speech and progress.  Summary  · Since a stroke results from damage to your brain and nervous system, it can affect your speech and ability to use and understand language.  · Aphasia is a condition that affects your ability to communicate with others. A stroke often causes aphasia because of " "damage to the left side of the brain.  · Speech-language therapy is a common treatment after a stroke.  · Your family and friends can help by being open about your problems, giving you time to form words and sentences, and speaking in short, simple sentences.  This information is not intended to replace advice given to you by your health care provider. Make sure you discuss any questions you have with your health care provider.  Document Revised: 09/11/2020 Document Reviewed: 04/06/2018  Elsevier Patient Education © 2021 [employer]    Warning Signs of a Stroke    A stroke is a medical emergency and should be treated right away--every second counts. A stroke is caused by a decrease or block in blood flow to the brain. When this occurs, certain areas of the brain do not get enough oxygen, and brain cells begin to die.  A stroke can lead to brain damage and can sometimes be life-threatening. However, if someone having a stroke gets medical treatment right away, he or she has better chances of surviving and recovering from the stroke. Being able to recognize the symptoms of a stroke is very important.  Types of strokes  There are two main types of strokes:  · Ischemic strokes. This is the most common type of stroke. These strokes happen when a blood vessel that supplies blood to the brain is being blocked.  · Hemorrhagic strokes. These strokes result from bleeding in the brain due to a blood vessel leaking or bursting (rupturing).  A transient ischemic attack (TIA) is a \"warning stroke\" that causes stroke-like symptoms that go away quickly. Unlike a stroke, a TIA does not cause permanent damage to the brain. However, the symptoms of a TIA are the same as a stroke, and they also require medical treatment right away. Having a TIA is a sign that you are at higher risk for a permanent stroke.  Warning signs of a stroke  The symptoms of stroke may vary and will reflect the part of the brain that is involved. Symptoms " "usually happen suddenly. \"BE FAST\" is an easy way to remember the main warning signs of a stroke.  B - Balance  Signs are dizziness, sudden trouble walking, or loss of balance.  E - Eyes  Signs are trouble seeing or a sudden change in vision.  F - Face  Signs are sudden weakness or numbness of the face, or the face or eyelid drooping on one side.  A - Arms  Signs are weakness or numbness in an arm. This happens suddenly and usually on one side of the body.  S - Speech  Signs are sudden trouble speaking, slurred speech, or trouble understanding what people say.  T - Time  Time to call emergency services. Write down what time symptoms started.  Other signs of a stroke  Some less common signs of a stroke include:  · A sudden, severe headache with no known cause.  · Nausea or vomiting.  · Seizure.  A stroke may be happening even if only one \"BE FAST\" symptoms is present.  These symptoms may represent a serious problem that is an emergency. Do not wait to see if the symptoms will go away. Get medical help right away. Call your local emergency services (911 in the [place] Do not drive yourself to the hospital.  Summary  · A stroke is a medical emergency and should be treated right away--every second counts.  · \"BE FAST\" is an easy way to remember the main warning signs of a stroke.  · Call local emergency services right away if you or someone else has any stroke symptoms, even if the symptoms go away.  · Make note of what time the first symptoms appeared. Emergency responders or emergency room staff will need to know this information.  · Do not wait to see if symptoms will go away. Call 911 even if only one of the \"BE FAST\" symptoms appears.  This information is not intended to replace advice given to you by your health care provider. Make sure you discuss any questions you have with your health care provider.  Document Revised: 11/30/2018 Document Reviewed: 04/06/2018  Elsevier Patient Education © 2021 Elsevier Inc.    "

## 2021-10-26 ENCOUNTER — TELEPHONE (OUTPATIENT)
Dept: NEUROLOGY | Facility: CLINIC | Age: 56
End: 2021-10-26

## 2021-10-26 DIAGNOSIS — I69.321: Primary | ICD-10-CM

## 2021-10-26 NOTE — TELEPHONE ENCOUNTER
Provider:  GAURAV ROYAL   Caller:  MARISA WITH [facility]       Phone Number: [phone]  Reason for Call:  CALLER IS REQUESTING AN UPDATED ORDER FOR SPEECH THERAPY AS PT IS SCHEDULED WITH THEM ON 10/28/2021 PLEASE ADVISE  FAX # [phone]  When was the patient last seen: 09/23/2021

## 2021-11-17 NOTE — THERAPY TREATMENT NOTE
Patient Name: Arlene Calderon  : 1965    MRN: [id number]                              Today's Date: 2021       Admit Date: 2021    Visit Dx:     ICD-10-CM ICD-9-CM   1. Impaired functional mobility, balance, gait, and endurance  Z74.09 V49.89   2. Dysphagia, unspecified type  R13.10 787.20   3. Cognitive communication deficit  R41.841 799.52     Patient Active Problem List   Diagnosis   • Acute left frontal ICH 2021 (CMS/HCC)   • Leukocytosis   • COVID-19 virus detected at admission     History reviewed. No pertinent past medical history.  History reviewed. No pertinent surgical history.  General Information     [facility] Name 21 1026          Physical Therapy Time and Intention    Document Type  therapy note (daily note)  -     Mode of Treatment  physical therapy  -[facility] Name 21 1026          General Information    Existing Precautions/Restrictions  fall;other (see comments) aphasia; RUE weakness; R inattention  -[facility] Name 21 1026          Cognition    Orientation Status (Cognition)  oriented to;person;verbal cues/prompts needed for orientation;place;time  -     Row Name 21 1026          Safety Issues, Functional Mobility    Impairments Affecting Function (Mobility)  balance;cognition;endurance/activity tolerance;strength;coordination;grasp;visual/perceptual;postural/trunk control;motor control  -       User Key  (r) = Recorded By, (t) = Taken By, (c) = Cosigned By    Initials Name Provider Type     Gris Farah PT Physical Therapist        Mobility     Row Name 21 1028          Bed Mobility    Supine-Sit [place] (Bed Mobility)  supervision;verbal cues  -     Assistive Device (Bed Mobility)  head of bed elevated;bed rails  -     Row Name 21 1028          Sit-Stand Transfer    Sit-Stand [place] (Transfers)  contact guard;verbal cues  -[facility] Name 21 1028          Gait/Stairs (Locomotion)    Cadott Level (Gait)   Patient has wound check tomorrow 11/18/2021. Unable to get hold of patient by phone. Will discuss Dr. Henri Harris recommendation with patient in office.      Roopa Barraza minimum assist (75% patient effort);verbal cues  -     Distance in Feet (Gait)  70  -     Deviations/Abnormal Patterns (Gait)  bilateral deviations;stride length decreased;chin decreased;weight shifting decreased  -     Bilateral Gait Deviations  forward flexed posture  -     Comment (Gait/Stairs)  VC for upright posture, looking forward. Noted significant difficulty attending and turning to R.  -       User Key  (r) = Recorded By, (t) = Taken By, (c) = Cosigned By    Initials Name Provider Type     Gris Farah, PT Physical Therapist        Obj/Interventions     Row Name 02/01/21 1030          Motor Skills    Therapeutic Exercise  knee;ankle cues to attend to R UE/LE  -[facility] Name 02/01/21 1030          Shoulder (Therapeutic Exercise)    Shoulder AAROM (Therapeutic Exercise)  bilateral;flexion;extension;sitting;10 repetitions also elbow flex/ext and digit flex/ext  -     Row Name 02/01/21 1030          Knee (Therapeutic Exercise)    Knee (Therapeutic Exercise)  strengthening exercise  -     Knee Strengthening (Therapeutic Exercise)  bilateral;marching while seated;LAQ (long arc quad);10 repetitions;sitting  -     Row Name 02/01/21 1030          Ankle (Therapeutic Exercise)    Ankle (Therapeutic Exercise)  AROM (active range of motion)  -     Ankle AROM (Therapeutic Exercise)  bilateral;dorsiflexion;plantarflexion;10 repetitions  -[facility] Name 02/01/21 1030          Balance    Static Sitting Balance  WFL;sitting, edge of bed  -     Static Standing Balance  WFL;standing  -       User Key  (r) = Recorded By, (t) = Taken By, (c) = Cosigned By    Initials Name Provider Type     Gris Farah, PT Physical Therapist        Goals/Plan    No documentation.       Clinical Impression     Row Name 02/01/21 1035          Pain Scale: Numbers Pre/Post-Treatment    Pretreatment Pain Rating  0/10 - no pain  -     Posttreatment Pain Rating  0/10 - no pain  -     Row Name 02/01/21 1035           Plan of Care Review    Plan of Care Reviewed With  patient  -LS     Progress  improving  -LS     Outcome Summary  Pt demonstrated increased indep with bed mobility. Progressed forward ambulation distance to 70 total ft with min A (requiring constant vc's to attend to R). Continues to be limited by R inattention, impulsivity, and aphasia, but very cooperative throughout, maintaining stable vitals. Will cont PT POC.  -LS     Row Name 02/01/21 1035          Vital Signs    Pre Systolic BP Rehab  138  -LS     Pre Treatment Diastolic BP  75  -LS     Post Systolic BP Rehab  131  -LS     Post Treatment Diastolic BP  62  -LS     Pretreatment Heart Rate (beats/min)  71  -LS     Posttreatment Heart Rate (beats/min)  58  -LS     Pre SpO2 (%)  97  -LS     O2 Delivery Pre Treatment  room air  -LS     Post SpO2 (%)  94  -LS     O2 Delivery Post Treatment  room air  -LS     Pre Patient Position  Supine  -LS     Intra Patient Position  Standing  -LS     Post Patient Position  Sitting  -LS     Row Name 02/01/21 1035          Positioning and Restraints    Pre-Treatment Position  in bed  -LS     Post Treatment Position  chair  -LS     In Chair  notified nsg;reclined;call light within reach;encouraged to call for assist;exit alarm on;waffle cushion;legs elevated;RUE elevated;LUE elevated;patient within staff view  -LS       User Key  (r) = Recorded By, (t) = Taken By, (c) = Cosigned By    Initials Name Provider Type    Gris Canseco, PT Physical Therapist        Outcome Measures     Row Name 02/01/21 1039          How much help from another person do you currently need...    Turning from your back to your side while in flat bed without using bedrails?  3  -LS     Moving from lying on back to sitting on the side of a flat bed without bedrails?  3  -LS     Moving to and from a bed to a chair (including a wheelchair)?  3  -LS     Standing up from a chair using your arms (e.g., wheelchair, bedside chair)?  3  -LS     Climbing 3-5  steps with a railing?  2  -LS     To walk in hospital room?  3  -LS     AM-PAC 6 Clicks Score (PT)  17  -LS     Row Name 02/01/21 1039          Modified [place] Scale    Modified Abby Scale  3 - Moderate disability.  Requiring some help, but able to walk without assistance.  -       User Key  (r) = Recorded By, (t) = Taken By, (c) = Cosigned By    Initials Name Provider Type     Gris Farah, PT Physical Therapist        Physical Therapy Education                 Title: PT OT SLP Therapies (In Progress)     Topic: Physical Therapy (In Progress)     Point: Mobility training (In Progress)     Learning Progress Summary           Patient Acceptance, E,D, NR by  at 2/1/2021 1039    Acceptance, E,D, VU,DU,NR by  at 1/31/2021 1059    Acceptance, E, NR by [facility] at 1/30/2021 0815                   Point: Home exercise program (In Progress)     Learning Progress Summary           Patient Acceptance, E,D, NR by  at 2/1/2021 1039    Acceptance, E,D, VU,DU,NR by  at 1/31/2021 1059                   Point: Body mechanics (In Progress)     Learning Progress Summary           Patient Acceptance, E,D, NR by  at 2/1/2021 1039    Acceptance, E,D, VU,DU,NR by  at 1/31/2021 1059                   Point: Precautions (In Progress)     Learning Progress Summary           Patient Acceptance, E,D, NR by  at 2/1/2021 1039    Acceptance, E,D, VU,DU,NR by  at 1/31/2021 1059                               User Key     Initials Effective Dates Name Provider Type Discipline     06/19/15 -  Janneth Hill, PT Physical Therapist PT    [facility] 07/17/19 -  Dulce Friend, PT Physical Therapist PT     06/19/15 -  Gris Farah, PT Physical Therapist PT              PT Recommendation and Plan     Plan of Care Reviewed With: patient  Progress: improving  Outcome Summary: Pt demonstrated increased indep with bed mobility. Progressed forward ambulation distance to 70 total ft with min A (requiring constant vc's to attend to R).  Continues to be limited by R inattention, impulsivity, and aphasia, but very cooperative throughout, maintaining stable vitals. Will cont PT POC.     Time Calculation:   PT Charges     Row Name 02/01/21 1040             Time Calculation    Start Time  0952  -LS      PT Received On  02/01/21  -         Time Calculation- PT    Total Timed Code Minutes- PT  24 minute(s)  -LS         Timed Charges    51519 - PT Therapeutic Exercise Minutes  7  -LS      18265 - Gait Training Minutes   15  -LS      64353 - PT Therapeutic Activity Minutes  2  -LS        User Key  (r) = Recorded By, (t) = Taken By, (c) = Cosigned By    Initials Name Provider Type     Gris Farah, PT Physical Therapist        Therapy Charges for Today     Code Description Service Date Service Provider Modifiers Qty    92325945082 HC PT THER PROC EA 15 MIN 2/1/2021 Gris Farah, PT GP 1    49929087927 HC GAIT TRAINING EA 15 MIN 2/1/2021 Gris Farah, PT GP 1          PT G-Codes  Outcome Measure Options: AM-PAC 6 Clicks Daily Activity (OT), Modified [place]  AM-PAC 6 Clicks Score (PT): 17  AM-PAC 6 Clicks Score (OT): 12  Modified Abby Scale: 3 - Moderate disability.  Requiring some help, but able to walk without assistance.    Gris Farah, PT  2/1/2021

## 2022-02-08 ENCOUNTER — OFFICE VISIT (OUTPATIENT)
Dept: NEUROLOGY | Facility: CLINIC | Age: 57
End: 2022-02-08

## 2022-02-08 VITALS
HEART RATE: 84 BPM | OXYGEN SATURATION: 95 % | DIASTOLIC BLOOD PRESSURE: 64 MMHG | SYSTOLIC BLOOD PRESSURE: 124 MMHG | HEIGHT: 61 IN | BODY MASS INDEX: 30.78 KG/M2 | TEMPERATURE: 97.4 F | WEIGHT: 163 LBS

## 2022-02-08 DIAGNOSIS — I61.0 NONTRAUMATIC SUBCORTICAL HEMORRHAGE OF LEFT CEREBRAL HEMISPHERE: Primary | ICD-10-CM

## 2022-02-08 DIAGNOSIS — R06.83 LOUD SNORING: ICD-10-CM

## 2022-02-08 DIAGNOSIS — F48.2 PBA (PSEUDOBULBAR AFFECT): ICD-10-CM

## 2022-02-08 DIAGNOSIS — Q21.12 PFO (PATENT FORAMEN OVALE): ICD-10-CM

## 2022-02-08 DIAGNOSIS — R53.82 CHRONIC FATIGUE: ICD-10-CM

## 2022-02-08 DIAGNOSIS — G31.84 MILD COGNITIVE IMPAIRMENT, SO STATED: ICD-10-CM

## 2022-02-08 PROCEDURE — 99214 OFFICE O/P EST MOD 30 MIN: CPT | Performed by: NURSE PRACTITIONER

## 2022-02-08 RX ORDER — DEXTROMETHORPHAN HYDROBROMIDE AND QUINIDINE SULFATE 20; 10 MG/1; MG/1
1 CAPSULE, GELATIN COATED ORAL EVERY 12 HOURS
Qty: 60 CAPSULE | Refills: 5 | Status: SHIPPED | OUTPATIENT
Start: 2022-02-08

## 2022-02-08 RX ORDER — DEXTROMETHORPHAN HYDROBROMIDE AND QUINIDINE SULFATE 20; 10 MG/1; MG/1
1 CAPSULE, GELATIN COATED ORAL DAILY
Qty: 13 CAPSULE | Refills: 0 | COMMUNITY
Start: 2022-02-08

## 2022-02-08 RX ORDER — BUPROPION HYDROCHLORIDE 100 MG/1
100 TABLET, EXTENDED RELEASE ORAL 2 TIMES DAILY
COMMUNITY
Start: 2022-01-25

## 2022-02-08 NOTE — PROGRESS NOTES
Follow Up Neurology Office Visit      Patient Name: Arlene Calderon    Referring Physician: No ref. provider found    Chief Complaint:    Chief Complaint   Patient presents with   • Follow-up     Pt in office to follow up on CVA       History of Present Illness: Arlene Calderon is a 56 y.o. female who is here to follow up with Neurology after suffering ICH in January 2021.  Reports that her symptoms are stable.   Continues to complain of poor memory, reports that she can remember events from childhood but not things that happened last week.     Has been attending speech therapy, does feel that it has helped some.     Feels that she is not sleeping well, reports waking frequently during the night. She does endorse snoring.     Endorses weight gain    She has stopped taking Keppra, she stopped this several months ago after last appointment. Denies any seizure activity since cessation of Keppra.     She continues to smoke, is trying to cut down with Wellbutrin. Reports smoking 1 ppd.     The following portions of the patient's history were reviewed and updated as appropriate: allergies, current medications, past family history, past medical history, past social history, past surgical history and problem list.    Subjective     Review of Systems:   Review of Systems   Constitutional: Positive for unexpected weight gain.   Eyes: Negative.    Respiratory: Positive for shortness of breath.    Cardiovascular: Negative for chest pain.   Endocrine: Negative.    Genitourinary: Negative.    Allergic/Immunologic: Negative.    Neurological: Positive for speech difficulty, memory problem and confusion.   Hematological: Negative.    Psychiatric/Behavioral: Positive for sleep disturbance and stress.     Medications:     Current Outpatient Medications:   •  amLODIPine (NORVASC) 5 MG tablet, Take 5 mg by mouth Daily., Disp: , Rfl:   •  buPROPion SR (WELLBUTRIN SR) 100 MG 12 hr tablet, Take 100 mg by mouth 2 (Two) Times a  "Day., Disp: , Rfl:   •  busPIRone (BUSPAR) 10 MG tablet, Take 10 mg by mouth 2 (Two) Times a Day As Needed., Disp: , Rfl:   •  citalopram (CeleXA) 40 MG tablet, Take 40 mg by mouth Daily., Disp: , Rfl:   •  multivitamin (MULTI VITAMIN DAILY PO), Take  by mouth Daily., Disp: , Rfl:   •  varenicline (Chantix Starting Month Pak) 0.5 MG X 11 & 1 MG X 42 tablet, Take 0.5 mg by mouth daily on days 1-3, then 0.5 mg twice daily on days 4-7, then 1 mg twice daily, Disp: 53 tablet, Rfl: 0  •  vitamin D (ERGOCALCIFEROL) 1.25 MG (93129 UT) capsule capsule, Take 50,000 Units by mouth 1 (One) Time Per Week., Disp: , Rfl:   •  dextromethorphan-quinidine (Nuedexta) 20-10 MG capsule capsule, Take 1 capsule by mouth Every 12 (Twelve) Hours., Disp: 60 capsule, Rfl: 5  •  dextromethorphan-quinidine (Nuedexta) 20-10 MG capsule capsule, Take 1 capsule by mouth Daily., Disp: 13 capsule, Rfl: 0    Allergies:   No Known Allergies    Objective     Physical Exam:  Vital Signs:   Vitals:    02/08/22 1511   BP: 124/64   Pulse: 84   Temp: 97.4 °F (36.3 °C)   SpO2: 95%   Weight: 73.9 kg (163 lb)   Height: 154.9 cm (61\")   PainSc: 0-No pain       Physical Exam  Neurological:      Mental Status: She is oriented to person, place, and time.      Gait: Gait is intact.      Deep Tendon Reflexes: Strength normal.   Psychiatric:         Speech: Speech normal.         Cognition and Memory: She exhibits impaired recent memory.      Comments:   CNS-LS 13       Neurologic Exam     Mental Status   Oriented to person, place, and time.   Attention: normal. Concentration: normal.   Speech: speech is normal   Level of consciousness: alert  Knowledge: consistent with education.   Normal comprehension.     Cranial Nerves   Cranial nerves II through XII intact.     Motor Exam   Muscle bulk: normal  Overall muscle tone: normal  Right arm pronator drift: absent  Left arm pronator drift: absent    Strength   Strength 5/5 throughout.     Sensory Exam   Light touch " normal.     Gait, Coordination, and Reflexes     Gait  Gait: normal    Tremor   Resting tremor: absent    Results Review:   I reviewed the patient's new clinical results.  I have reviewed the patient's other medical records to include, labs, radiology and referrals.     Assessment / Plan      Assessment/Plan:   Diagnoses and all orders for this visit:    1. 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  She does continue to have some reported mild cognitive impairment and speech finding difficulties, continues with speech-language therapy.     2. PFO (patent foramen ovale)  She was found to have PFO during hospitalization last year, per review of cardiology recommend ASA daily, I have contacted Dr. Villa for approval. If ok per Dr. Villa (neurosurgery), initiate ASA 81mg daily.     3. PBA (pseudobulbar affect)  -     dextromethorphan-quinidine (Nuedexta) 20-10 MG capsule capsule; Take 1 capsule by mouth Every 12 (Twelve) Hours.  Dispense: 60 capsule; Refill: 5  -     dextromethorphan-quinidine (Nuedexta) 20-10 MG capsule capsule; Take 1 capsule by mouth Daily.  Dispense: 13 capsule; Refill: 0  Patient reports labile mood and depression, Cns-LS 13 today. We discussed using Nudexta for PBA, she agreed to this today. Samples provided and dosing instructions reviewed.   4. Loud snoring  -     Polysomnography 4 or More Parameters With CPAP; Future  -     COVID PRE-OP / PRE-PROCEDURE SCREENING ORDER (NO ISOLATION) - Swab, Nasopharynx; Future    5. Mild cognitive impairment, so stated  -     Polysomnography 4 or More Parameters With CPAP; Future  -     COVID PRE-OP / PRE-PROCEDURE SCREENING ORDER (NO ISOLATION) - Swab, Nasopharynx; Future    6. Chronic fatigue  -     Polysomnography 4 or More Parameters With CPAP; Future  -     COVID PRE-OP / PRE-PROCEDURE SCREENING ORDER  (NO ISOLATION) - Swab, Nasopharynx; Future  Patient has been reporting fatigue, weight gain, mild cognitive impairment, and labile mood.  She also admits to loud snoring when questioned today.  BMI 30.  I have ordered polysomnography to assess for sleep apnea.     Follow Up:   Return in about 3 months (around 5/8/2022) for Next scheduled follow up.     ANNE Walls  [facility]   AS THE PROVIDER, I PERSONALLY WORE PPE DURING ENTIRE FACE TO FACE ENCOUNTER IN CLINIC WITH THE PATIENT. PATIENT ALSO WORE PPE DURING ENTIRE FACE TO FACE ENCOUNTER EXCEPT FOR A MAX OF 30 SECONDS DURING NEUROLOGICAL EVALUATION OF CRANIAL NERVES AND THEN MASK WAS PLACED BACK OVER PATIENT FACE FOR REMAINDER OF VISIT. I WASHED MY HANDS BEFORE AND AFTER VISIT.    Please note that portions of this note may have been completed with a voice recognition program. Efforts were made to edit the dictations, but occasionally words are mistranscribed.

## 2022-02-08 NOTE — PATIENT INSTRUCTIONS
You will start a new medication for your mood called Nudexta. Take one pill per day for one week, then you will take one pill twice per day after that. We will start you with samples today.     Sleep Apnea  Sleep apnea affects breathing during sleep. It causes breathing to stop for a short time or to become shallow. It can also increase the risk of:  · Heart attack.  · Stroke.  · Being very overweight (obese).  · Diabetes.  · Heart failure.  · Irregular heartbeat.  The goal of treatment is to help you breathe normally again.  What are the causes?  There are three kinds of sleep apnea:  · Obstructive sleep apnea. This is caused by a blocked or collapsed airway.  · Central sleep apnea. This happens when the brain does not send the right signals to the muscles that control breathing.  · Mixed sleep apnea. This is a combination of obstructive and central sleep apnea.  The most common cause of this condition is a collapsed or blocked airway. This can happen if:  · Your throat muscles are too relaxed.  · Your tongue and tonsils are too large.  · You are overweight.  · Your airway is too small.  What increases the risk?  · Being overweight.  · Smoking.  · Having a small airway.  · Being older.  · Being male.  · Drinking alcohol.  · Taking medicines to calm yourself (sedatives or tranquilizers).  · Having family members with the condition.  What are the signs or symptoms?  · Trouble staying asleep.  · Being sleepy or tired during the day.  · Getting angry a lot.  · Loud snoring.  · Headaches in the morning.  · Not being able to focus your mind (concentrate).  · Forgetting things.  · Less interest in sex.  · Mood swings.  · Personality changes.  · Feelings of sadness (depression).  · Waking up a lot during the night to pee (urinate).  · Dry mouth.  · Sore throat.  How is this diagnosed?  · Your medical history.  · A physical exam.  · A test that is done when you are sleeping (sleep study). The test is most often done in a  sleep lab but may also be done at home.  How is this treated?    · Sleeping on your side.  · Using a medicine to get rid of mucus in your nose (decongestant).  · Avoiding the use of alcohol, medicines to help you relax, or certain pain medicines (narcotics).  · Losing weight, if needed.  · Changing your diet.  · Not smoking.  · Using a machine to open your airway while you sleep, such as:  ? An oral appliance. This is a mouthpiece that shifts your lower jaw forward.  ? A CPAP device. This device blows air through a mask when you breathe out (exhale).  ? An EPAP device. This has valves that you put in each nostril.  ? A BPAP device. This device blows air through a mask when you breathe in (inhale) and breathe out.  · Having surgery if other treatments do not work.  It is important to get treatment for sleep apnea. Without treatment, it can lead to:  · High blood pressure.  · Coronary artery disease.  · In men, not being able to have an erection (impotence).  · Reduced thinking ability.  Follow these instructions at home:  Lifestyle  · Make changes that your doctor recommends.  · Eat a healthy diet.  · Lose weight if needed.  · Avoid alcohol, medicines to help you relax, and some pain medicines.  · Do not use any products that contain nicotine or tobacco, such as cigarettes, e-cigarettes, and chewing tobacco. If you need help quitting, ask your doctor.  General instructions  · Take over-the-counter and prescription medicines only as told by your doctor.  · If you were given a machine to use while you sleep, use it only as told by your doctor.  · If you are having surgery, make sure to tell your doctor you have sleep apnea. You may need to bring your device with you.  · Keep all follow-up visits as told by your doctor. This is important.  Contact a doctor if:  · The machine that you were given to use during sleep bothers you or does not seem to be working.  · You do not get better.  · You get worse.  Get help right  away if:  · Your chest hurts.  · You have trouble breathing in enough air.  · You have an uncomfortable feeling in your back, arms, or stomach.  · You have trouble talking.  · One side of your body feels weak.  · A part of your face is hanging down.  These symptoms may be an emergency. Do not wait to see if the symptoms will go away. Get medical help right away. Call your local emergency services (911 in the [place] Do not drive yourself to the hospital.  Summary  · This condition affects breathing during sleep.  · The most common cause is a collapsed or blocked airway.  · The goal of treatment is to help you breathe normally while you sleep.  This information is not intended to replace advice given to you by your health care provider. Make sure you discuss any questions you have with your health care provider.  Document Revised: 10/04/2019 Document Reviewed: 08/13/2019  [employer] Patient Education © 2021 [employer]    Steps to Quit Smoking  Smoking tobacco is the leading cause of preventable death. It can affect almost every organ in the body. Smoking puts you and people around you at risk for many serious, long-lasting (chronic) diseases. Quitting smoking can be hard, but it is one of the best things that you can do for your health. It is never too late to quit.  How do I get ready to quit?  When you decide to quit smoking, make a plan to help you succeed. Before you quit:  · Pick a date to quit. Set a date within the next 2 weeks to give you time to prepare.  · Write down the reasons why you are quitting. Keep this list in places where you will see it often.  · Tell your family, friends, and co-workers that you are quitting. Their support is important.  · Talk with your doctor about the choices that may help you quit.  · Find out if your health insurance will pay for these treatments.  · Know the people, places, things, and activities that make you want to smoke (triggers). Avoid them.  What first steps can I  take to quit smoking?  · Throw away all cigarettes at home, at work, and in your car.  · Throw away the things that you use when you smoke, such as ashtrays and lighters.  · Clean your car. Make sure to empty the ashtray.  · Clean your home, including curtains and carpets.  What can I do to help me quit smoking?  Talk with your doctor about taking medicines and seeing a counselor at the same time. You are more likely to succeed when you do both.  · If you are pregnant or breastfeeding, talk with your doctor about counseling or other ways to quit smoking. Do not take medicine to help you quit smoking unless your doctor tells you to do so.  To quit smoking:  Quit right away  · Quit smoking totally, instead of slowly cutting back on how much you smoke over a period of time.  · Go to counseling. You are more likely to quit if you go to counseling sessions regularly.  Take medicine  You may take medicines to help you quit. Some medicines need a prescription, and some you can buy over-the-counter. Some medicines may contain a drug called nicotine to replace the nicotine in cigarettes. Medicines may:  · Help you to stop having the desire to smoke (cravings).  · Help to stop the problems that come when you stop smoking (withdrawal symptoms).  Your doctor may ask you to use:  · Nicotine patches, gum, or lozenges.  · Nicotine inhalers or sprays.  · Non-nicotine medicine that is taken by mouth.  Find resources  Find resources and other ways to help you quit smoking and remain smoke-free after you quit. These resources are most helpful when you use them often. They include:  · Online chats with a counselor.  · Phone quitlines.  · Printed self-help materials.  · Support groups or group counseling.  · Text messaging programs.  · Mobile phone apps. Use apps on your mobile phone or tablet that can help you stick to your quit plan. There are many free apps for mobile phones and tablets as well as websites. Examples include Quit  Guide from the CDC and smokefree.gov    What things can I do to make it easier to quit?    · Talk to your family and friends. Ask them to support and encourage you.  · Call a phone quitline (0-800QUIT-NOW), reach out to support groups, or work with a counselor.  · Ask people who smoke to not smoke around you.  · Avoid places that make you want to smoke, such as:  ? Bars.  ? Parties.  ? Smoke-break areas at work.  · Spend time with people who do not smoke.  · Lower the stress in your life. Stress can make you want to smoke. Try these things to help your stress:  ? Getting regular exercise.  ? Doing deep-breathing exercises.  ? Doing yoga.  ? Meditating.  ? Doing a body scan. To do this, close your eyes, focus on one area of your body at a time from head to toe. Notice which parts of your body are tense. Try to relax the muscles in those areas.  How will I feel when I quit smoking?  Day 1 to 3 weeks  Within the first 24 hours, you may start to have some problems that come from quitting tobacco. These problems are very bad 2-3 days after you quit, but they do not often last for more than 2-3 weeks. You may get these symptoms:  · Mood swings.  · Feeling restless, nervous, angry, or annoyed.  · Trouble concentrating.  · Dizziness.  · Strong desire for high-sugar foods and nicotine.  · Weight gain.  · Trouble pooping (constipation).  · Feeling like you may vomit (nausea).  · Coughing or a sore throat.  · Changes in how the medicines that you take for other issues work in your body.  · Depression.  · Trouble sleeping (insomnia).  Week 3 and afterward  After the first 2-3 weeks of quitting, you may start to notice more positive results, such as:  · Better sense of smell and taste.  · Less coughing and sore throat.  · Slower heart rate.  · Lower blood pressure.  · Clearer skin.  · Better breathing.  · Fewer sick days.  Quitting smoking can be hard. Do not give up if you fail the first time. Some people need to try a few  times before they succeed. Do your best to stick to your quit plan, and talk with your doctor if you have any questions or concerns.  Summary  · Smoking tobacco is the leading cause of preventable death. Quitting smoking can be hard, but it is one of the best things that you can do for your health.  · When you decide to quit smoking, make a plan to help you succeed.  · Quit smoking right away, not slowly over a period of time.  · When you start quitting, seek help from your doctor, family, or friends.  This information is not intended to replace advice given to you by your health care provider. Make sure you discuss any questions you have with your health care provider.  Document Revised: 09/11/2020 Document Reviewed: 03/07/2020  [employer] Patient Education © 2021 [employer]

## 2022-02-22 ENCOUNTER — HOSPITAL ENCOUNTER (OUTPATIENT)
Dept: SLEEP MEDICINE | Facility: HOSPITAL | Age: 57
End: 2022-02-22

## 2022-03-23 ENCOUNTER — HOSPITAL ENCOUNTER (OUTPATIENT)
Dept: SLEEP MEDICINE | Facility: HOSPITAL | Age: 57
Discharge: HOME | End: 2022-03-23
Admitting: NURSE PRACTITIONER

## 2022-03-23 DIAGNOSIS — G31.84 MILD COGNITIVE IMPAIRMENT, SO STATED: ICD-10-CM

## 2022-03-23 DIAGNOSIS — R06.83 LOUD SNORING: ICD-10-CM

## 2022-03-23 DIAGNOSIS — R53.82 CHRONIC FATIGUE: ICD-10-CM

## 2022-03-23 PROCEDURE — 95810 POLYSOM 6/> YRS 4/> PARAM: CPT | Performed by: INTERNAL MEDICINE

## 2022-03-23 PROCEDURE — 95810 POLYSOM 6/> YRS 4/> PARAM: CPT

## 2022-11-23 ENCOUNTER — OFFICE VISIT (OUTPATIENT)
Dept: OBSTETRICS AND GYNECOLOGY | Facility: CLINIC | Age: 57
End: 2022-11-23

## 2022-11-23 VITALS — HEIGHT: 61 IN | WEIGHT: 164 LBS | BODY MASS INDEX: 30.96 KG/M2

## 2022-11-23 DIAGNOSIS — N90.7 INCLUSION CYST OF VULVA: Primary | ICD-10-CM

## 2022-11-23 PROCEDURE — 99213 OFFICE O/P EST LOW 20 MIN: CPT | Performed by: OBSTETRICS & GYNECOLOGY

## 2022-11-23 NOTE — PROGRESS NOTES
Subjective   Chief Complaint   Patient presents with   • labial bump     Pt here for bump on outside of vagina      Arlene Calderon is a 57 y.o. year old .  No LMP recorded. Patient is postmenopausal.  She presents to be seen because of external vaginal bump that has been present for many years, no change, no drainage.   .     OTHER COMPLAINTS:  Nothing else    The following portions of the patient's history were reviewed and updated as appropriate:  She  has a past medical history of COVID-19 virus detected at admission (2021), Hypertension, PFO (patent foramen ovale), and Stroke (cerebrum) (Former[facility]) (2221).  She does not have any pertinent problems on file.  She  has a past surgical history that includes Gastric Sleeve () and  section.  Her family history includes Diabetes in her brother and father; Heart disease in her mother.  She  reports that she has been smoking cigarettes. She has never used smokeless tobacco. She reports current alcohol use. She reports that she does not use drugs.  Current Outpatient Medications   Medication Sig Dispense Refill   • amLODIPine (NORVASC) 5 MG tablet Take 5 mg by mouth Daily.     • buPROPion SR (WELLBUTRIN SR) 100 MG 12 hr tablet Take 100 mg by mouth 2 (Two) Times a Day.     • busPIRone (BUSPAR) 10 MG tablet Take 10 mg by mouth 2 (Two) Times a Day As Needed.     • citalopram (CeleXA) 40 MG tablet Take 40 mg by mouth Daily.     • dextromethorphan-quinidine (Nuedexta) 20-10 MG capsule capsule Take 1 capsule by mouth Every 12 (Twelve) Hours. 60 capsule 5   • dextromethorphan-quinidine (Nuedexta) 20-10 MG capsule capsule Take 1 capsule by mouth Daily. 13 capsule 0   • multivitamin (MULTI VITAMIN DAILY PO) Take  by mouth Daily.     • varenicline (Chantix Starting Month ) 0.5 MG X 11 & 1 MG X 42 tablet Take 0.5 mg by mouth daily on days 1-3, then 0.5 mg twice daily on days 4-7, then 1 mg twice daily 53 tablet 0   • vitamin D (ERGOCALCIFEROL) 1.25 MG  "(29588 UT) capsule capsule Take 50,000 Units by mouth 1 (One) Time Per Week.       No current facility-administered medications for this visit.     Current Outpatient Medications on File Prior to Visit   Medication Sig   • amLODIPine (NORVASC) 5 MG tablet Take 5 mg by mouth Daily.   • buPROPion SR (WELLBUTRIN SR) 100 MG 12 hr tablet Take 100 mg by mouth 2 (Two) Times a Day.   • busPIRone (BUSPAR) 10 MG tablet Take 10 mg by mouth 2 (Two) Times a Day As Needed.   • citalopram (CeleXA) 40 MG tablet Take 40 mg by mouth Daily.   • dextromethorphan-quinidine (Nuedexta) 20-10 MG capsule capsule Take 1 capsule by mouth Every 12 (Twelve) Hours.   • dextromethorphan-quinidine (Nuedexta) 20-10 MG capsule capsule Take 1 capsule by mouth Daily.   • multivitamin (MULTI VITAMIN DAILY PO) Take  by mouth Daily.   • varenicline (Chantix Starting Month Pak) 0.5 MG X 11 & 1 MG X 42 tablet Take 0.5 mg by mouth daily on days 1-3, then 0.5 mg twice daily on days 4-7, then 1 mg twice daily   • vitamin D (ERGOCALCIFEROL) 1.25 MG (82275 UT) capsule capsule Take 50,000 Units by mouth 1 (One) Time Per Week.     No current facility-administered medications on file prior to visit.     She has No Known Allergies.    Social History    Tobacco Use      Smoking status: Every Day        Years: 40.00        Types: Cigarettes      Smokeless tobacco: Never    Review of Systems  Consitutional POS: nothing reported    NEG: anorexia or night sweats   Gastointestinal POS: nothing reported    NEG: bloating, change in bowel habits, melena or reflux symptoms   Genitourinary POS: nothing reported    NEG: dysuria or hematuria   Integument POS: nothing reported    NEG: moles that are changing in size, shape, color or rashes   Breast POS: nothing reported    NEG: persistent breast lump, skin dimpling or nipple discharge         Pertinent items are noted in HPI.          Objective   Ht 154.9 cm (61\")   Wt 74.4 kg (164 lb)   BMI 30.99 kg/m²     General:  well " developed; well nourished  no acute distress   Skin:  Not performed.   Thyroid: not examined   Lungs:  Not performed.  breathing is unlabored   Heart:  Not performed.   Breasts:  Not performed.   Abdomen: Not performed.   Pelvis: Clinical staff was present for exam  External genitalia:  normal appearance of the external genitalia including Bartholin's and [place]'s glands. right labial inclusion cyst 1cm     Psychiatric: Alert and oriented ×3, mood and affect appropriate  HEENT: Atraumatic, normocephalic, normal scleral icterus  Extremities: 2+ pulses bilaterally, no edema      Lab Review   No data reviewed    Imaging   No data reviewed        Assessment   1. Labial lnclusion cyst     Plan   1. reassurrance given  2.     No orders of the defined types were placed in this encounter.         This note was electronically signed.      November 23, 2022

## 2023-07-25 ENCOUNTER — TELEPHONE (OUTPATIENT)
Dept: CARDIOLOGY | Facility: CLINIC | Age: 58
End: 2023-07-25
Payer: MEDICARE

## 2023-07-25 NOTE — TELEPHONE ENCOUNTER
LVM for patient to call office in regards to a referral from Maggi Talamantes. Patient has been seen in this office. Patient needs follow with Dr. Summers or Ana Romero at next available. OK for hub to schedule.

## 2023-09-12 PROBLEM — Z01.810 PREOPERATIVE CARDIOVASCULAR EXAMINATION: Status: ACTIVE | Noted: 2023-09-12

## 2023-09-20 NOTE — PROGRESS NOTES
[facility] Cardiology Office Follow Up Note    Arlene Calderon  [id number]  10/04/2023    Primary Care Provider: Maggi Talamantes APRN   Referring Provider: No ref. provider found    Chief Complaint: Cardiac Clearance for bariatric surgery    History of Present Illness:   Mrs. Arlene Calderon is a 58 y.o. female who presents to the Cardiology Clinic preoperative cardiac examination.   The patient has a past medical history of hypertension, and tobacco use with complete smoking cessation 2-3 months ago.   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  She has a past cardiac history is significant for a small PFO noted at the time of her work-up for her intracranial hemorrhage.  She presents today for surgical clearance.  The patient reports were feeling well from a cardiac standpoint.  She specifically denies chest pain and dyspnea.  She denies chest pain and dyspnea.  She denies palpitations, dizziness, syncope.  She denies orthopnea, PND, and lower extremity edema.  She offers no other complaints or concerns at this time.    Past Cardiac Testin. Last Coronary Angio: None  2. Prior Stress Testin2021   1.  Normal exercise treadmill stress test.  2.  Normal chronotropic and BP response to exercise.  3.  Fair exercise capacity, reaching 7.0 METS.  4.  Low risk treadmill score.  3. Last Echo:  2021          1. Saline test results are positive with valsalva manuever for right to left atrial level shunt.          2. Estimated left ventricular EF = 60% Estimated left ventricular EF was in agreement with the calculated left ventricular EF. Left ventricular ejection fraction appears to be 56 - 60%. Left ventricular systolic function is normal.          3. Left ventricular diastolic function was normal.          4. Normal right ventricular cavity size,  wall thickness, systolic function and septal motion noted.    5. No evidence of pulmonary hypertension is present.    6. There is no evidence of pericardial effusion.          7. No significant structural or functional valvular abnormality demonstrated.          8. Small patent foramen ovale present.  4. Prior Holter Monitor: 6/28/2021   Normal cardiac monitor     Review of Systems:   Review of Systems  As Noted in HPI.   I have reviewed and confirmed the accuracy of the ROS as documented by the MA/LPN/RN ANNE Dunn    I have reviewed and/or updated the patient's past medical, past surgical, family, social history, problem list and allergies as appropriate.     Medications:     Current Outpatient Medications:     albuterol sulfate HFA (Ventolin HFA) 108 (90 Base) MCG/ACT inhaler, Inhale 2 puffs Every 4 (Four) Hours As Needed for Wheezing or Shortness of Air., Disp: 18 g, Rfl: 5    Allergy Relief 180 MG tablet, , Disp: , Rfl:     amLODIPine (NORVASC) 5 MG tablet, Take 1 tablet by mouth Daily., Disp: , Rfl:     atorvastatin (LIPITOR) 20 MG tablet, , Disp: , Rfl:     citalopram (CeleXA) 40 MG tablet, Take 1 tablet by mouth Daily., Disp: , Rfl:     famotidine (PEPCID) 20 MG tablet, , Disp: , Rfl:     Fluticasone Furoate-Vilanterol (BREO ELLIPTA) 200-25 MCG/ACT inhaler, Inhale 1 puff Daily. Rinse mouth with water after use., Disp: 60 each, Rfl: 5    multivitamin (THERAGRAN) tablet tablet, Take  by mouth Daily., Disp: , Rfl:     omeprazole (priLOSEC) 20 MG capsule, , Disp: , Rfl:     rOPINIRole (REQUIP) 3 MG tablet, , Disp: , Rfl:     traZODone (DESYREL) 100 MG tablet, , Disp: , Rfl:     varenicline (Chantix Starting Month Pak) 0.5 MG X 11 & 1 MG X 42 tablet, Take 0.5 mg by mouth daily on days 1-3, then 0.5 mg twice daily on days 4-7, then 1 mg twice daily, Disp: 53 tablet, Rfl: 0    vitamin D (ERGOCALCIFEROL) 1.25 MG (71423 UT) capsule capsule, Take 1 capsule by mouth 1 (One) Time Per Week., Disp: , Rfl:  "    Physical Exam:  Vital Signs:   Vitals:    10/04/23 1105   BP: 138/82   BP Location: Left arm   Patient Position: Sitting   Cuff Size: Adult   Pulse: 99   Resp: 16   Temp: 97.5 °F (36.4 °C)   TempSrc: Infrared   SpO2: 94%  Comment: RA   Weight: 87.5 kg (193 lb)   Height: 154.9 cm (61\")     Body mass index is 36.47 kg/m².    Physical Exam  Vitals and nursing note reviewed.   Constitutional:       General: She is not in acute distress.     Appearance: She is obese.   HENT:      Head: Normocephalic and atraumatic.   Neck:      Trachea: Trachea normal.   Cardiovascular:      Rate and Rhythm: Normal rate and regular rhythm.      Pulses: Normal pulses.      Heart sounds: Normal heart sounds. No murmur heard.    No friction rub. No gallop.   Pulmonary:      Effort: Pulmonary effort is normal.      Breath sounds: Normal breath sounds.   Musculoskeletal:      Cervical back: Neck supple.      Right lower leg: No edema.      Left lower leg: No edema.   Skin:     General: Skin is warm and dry.   Neurological:      Mental Status: She is alert and oriented to person, place, and time.   Psychiatric:         Mood and Affect: Mood normal.         Behavior: Behavior normal. Behavior is cooperative.         Thought Content: Thought content does not include suicidal ideation.       Results Review:   I reviewed the patient's new clinical results.      ECG 12 Lead    Date/Time: 10/4/2023 3:06 PM  Performed by: Ana Romero APRN  Authorized by: Ana Romero APRN   Rhythm: sinus rhythm  Rate: normal  QRS axis: normal  Other findings: T wave abnormality    Clinical impression: abnormal EKG  Comments: Artifact also noted        Assessment / Plan:   Diagnoses and all orders for this visit:    1. Preoperative cardiovascular examination (Primary)  Revised Cardiac Risk Index  Estimated Risk of Adverse Outcome with Non-cardiac Surgery  Low Risk  Estimated Rate of Myocardial Infarction, Pulmonary Edema, Ventricular " Fibrillation, Cardiac Arrest, or Complete Heart Block  0.9 %    2. Essential hypertension  Acceptable blood pressure control    3. Hyperlipidemia LDL goal <70  7/23, LDL 88 mg/dL  Continue statin therapy    4. PFO (patent foramen ovale)  Small PFO, with shunting noted only during Valsalva per 2021 echo  No indication for closure      Preventative Cardiology:   Tobacco Cessation: N/A   Advance Care Planning: ACP discussion was declined by the patient. Patient does not have an advance directive, declines further assistance.     Follow Up:   Return in about 1 year (around 10/4/2024) for Follow-up with Dr. Summers.      Thank you for allowing me to participate in the care of your patient. Please do not hesitate to contact me with additional questions or concerns.     ANNE Harrison

## 2023-09-27 ENCOUNTER — OFFICE VISIT (OUTPATIENT)
Dept: PULMONOLOGY | Facility: CLINIC | Age: 58
End: 2023-09-27
Payer: MEDICARE

## 2023-09-27 VITALS
BODY MASS INDEX: 36.44 KG/M2 | RESPIRATION RATE: 16 BRPM | WEIGHT: 193 LBS | DIASTOLIC BLOOD PRESSURE: 78 MMHG | OXYGEN SATURATION: 95 % | SYSTOLIC BLOOD PRESSURE: 124 MMHG | HEART RATE: 87 BPM | HEIGHT: 61 IN

## 2023-09-27 DIAGNOSIS — R06.02 SHORTNESS OF BREATH: Primary | ICD-10-CM

## 2023-09-27 DIAGNOSIS — J44.9 CHRONIC OBSTRUCTIVE PULMONARY DISEASE, UNSPECIFIED COPD TYPE: ICD-10-CM

## 2023-09-27 DIAGNOSIS — G47.34 NOCTURNAL HYPOXIA: Primary | ICD-10-CM

## 2023-09-27 DIAGNOSIS — E66.9 OBESITY (BMI 30-39.9): ICD-10-CM

## 2023-09-27 DIAGNOSIS — Z87.891 PERSONAL HISTORY OF NICOTINE DEPENDENCE: ICD-10-CM

## 2023-09-27 DIAGNOSIS — R06.02 SHORTNESS OF BREATH: ICD-10-CM

## 2023-09-27 DIAGNOSIS — Z01.811 PREOP PULMONARY/RESPIRATORY EXAM: ICD-10-CM

## 2023-09-27 DIAGNOSIS — R06.83 SNORING: ICD-10-CM

## 2023-09-27 DIAGNOSIS — G47.33 OBSTRUCTIVE SLEEP APNEA: ICD-10-CM

## 2023-09-27 RX ORDER — OMEPRAZOLE 20 MG/1
CAPSULE, DELAYED RELEASE ORAL
COMMUNITY
Start: 2023-09-19

## 2023-09-27 RX ORDER — ALBUTEROL SULFATE 90 UG/1
2 AEROSOL, METERED RESPIRATORY (INHALATION) EVERY 4 HOURS PRN
Qty: 18 G | Refills: 5 | Status: SHIPPED | OUTPATIENT
Start: 2023-09-27

## 2023-09-27 RX ORDER — BUDESONIDE AND FORMOTEROL FUMARATE DIHYDRATE 160; 4.5 UG/1; UG/1
AEROSOL RESPIRATORY (INHALATION)
COMMUNITY
Start: 2023-09-19 | End: 2023-09-27

## 2023-09-27 RX ORDER — FLUTICASONE FUROATE AND VILANTEROL 200; 25 UG/1; UG/1
1 POWDER RESPIRATORY (INHALATION) DAILY
Qty: 60 EACH | Refills: 5 | Status: SHIPPED | OUTPATIENT
Start: 2023-09-27

## 2023-09-27 RX ORDER — FEXOFENADINE HYDROCHLORIDE 180 MG/1
TABLET, FILM COATED ORAL
COMMUNITY
Start: 2023-09-19

## 2023-09-27 RX ORDER — FAMOTIDINE 20 MG/1
TABLET, FILM COATED ORAL
COMMUNITY
Start: 2023-09-19

## 2023-09-27 RX ORDER — ATORVASTATIN CALCIUM 20 MG/1
TABLET, FILM COATED ORAL
COMMUNITY
Start: 2023-09-19

## 2023-09-27 RX ORDER — ROPINIROLE 3 MG/1
TABLET, FILM COATED ORAL
COMMUNITY
Start: 2023-09-19

## 2023-09-27 RX ORDER — TRAZODONE HYDROCHLORIDE 100 MG/1
TABLET ORAL
COMMUNITY
Start: 2023-09-22

## 2023-09-27 NOTE — PROGRESS NOTES
CONSULT NOTE    Requested by:   Maggi Talamantes APRN Margison, Gloria, APRN      Chief Complaint   Patient presents with    Breathing Problem    Consult       Subjective:  Arlene Calderon is a 58 y.o. female.   Patient comes in today for evaluation of shortness of breath. Patient says that for the past few years, she has been noticing that her exercise tolerance has been mildly declining.     Patient used to smoke 1 pack per day for the past 50 years and quit smoking 3 months ago.     she is currently not on oxygen.     she does have a family history of chronic obstructive disease,  in her mother & sibling.    Upon questioning, she is complaining of sleep disturbance. Patient says that for the past few years, she has had trouble with snoring. Patient has also been told that she sometimes quits breathing at night.       The patient has been told that she occasionally talks in her sleep.     Patient's sleep schedule was reviewed.       The following portions of the patient's history were reviewed and updated as appropriate: allergies, current medications, past family history, past medical history, past social history, and past surgical history.    Review of Systems   HENT:  Negative for sinus pressure, sneezing and sore throat.    Respiratory:  Positive for shortness of breath and wheezing. Negative for cough and chest tightness.    Cardiovascular:  Negative for palpitations and leg swelling.   Psychiatric/Behavioral:  Negative for sleep disturbance.    All other systems reviewed and are negative.    Past Medical History:   Diagnosis Date    COVID-19 virus detected at admission 2021    Hypertension     PFO (patent foramen ovale)     Stroke (cerebrum) 2221       Social History     Tobacco Use    Smoking status: Former     Packs/day: 1.00     Years: 50.00     Pack years: 50.00     Types: Cigarettes     Quit date: 2023     Years since quittin.3    Smokeless tobacco: Never   Substance Use Topics  "   Alcohol use: Yes     Comment: WEEKENDS         Objective:  Visit Vitals  /78   Pulse 87   Resp 16   Ht 154.9 cm (61\") Comment: pt reported   Wt 87.5 kg (193 lb)   SpO2 95%   BMI 36.47 kg/m²       Physical Exam  Vitals reviewed.   Constitutional:       Appearance: She is well-developed.   HENT:      Head: Normocephalic and atraumatic.      Mouth/Throat:      Comments: Oropharynx was crowded.  Missing teeth noted.   Eyes:      Extraocular Movements: Extraocular movements intact.   Cardiovascular:      Rate and Rhythm: Normal rate.   Pulmonary:      Comments: Somewhat hyperresonant to percussion.  Somewhat decreased air entry.  No obvious wheezing noted.   Musculoskeletal:      Cervical back: Neck supple.      Comments: Gait was normal.   Neurological:      Mental Status: She is alert and oriented to person, place, and time.       Assessment/Plan:  Diagnoses and all orders for this visit:    1. Shortness of breath (Primary)  -     Complete PFT - Pre & Post Bronchodilator; Future    2. Chronic obstructive pulmonary disease, unspecified COPD type  -     Complete PFT - Pre & Post Bronchodilator; Future    3. Personal history of nicotine dependence  -     Complete PFT - Pre & Post Bronchodilator; Future  -      CT Chest Low Dose Cancer Screening WO; Future    4. Obstructive sleep apnea  -     Home Sleep Study; Future    5. Snoring  -     Home Sleep Study; Future    6. Obesity (BMI 30-39.9)  -     Home Sleep Study; Future    7. Preop pulmonary/respiratory exam  -     Home Sleep Study; Future    Other orders  -     Fluticasone Furoate-Vilanterol (BREO ELLIPTA) 200-25 MCG/ACT inhaler; Inhale 1 puff Daily. Rinse mouth with water after use.  Dispense: 60 each; Refill: 5  -     albuterol sulfate HFA (Ventolin HFA) 108 (90 Base) MCG/ACT inhaler; Inhale 2 puffs Every 4 (Four) Hours As Needed for Wheezing or Shortness of Air.  Dispense: 18 g; Refill: 5        Return in about 4 months (around 1/27/2024) for Recheck, " Imaging, Sleep study, For Janneth Treadwell), ...Also 10 mths w/Beverley.    DISCUSSION(if any):  Last chest x-ray was reviewed personally and the results were shared with the patient.  Images reviewed personally.   Results for orders placed during the hospital encounter of 01/29/21    XR Chest 1 View    Narrative  EXAMINATION: XR CHEST 1 VW-    INDICATION: Positive Covid PCR 1/29/2021; Z74.09-Other reduced mobility;  R13.10-Dysphagia, unspecified; R41.841-Cognitive communication deficit    COMPARISON: NONE    FINDINGS: No focal airspace opacity. No pleural effusion or  pneumothorax. Normal heart and mediastinal contours.    Impression  No evidence of acute disease in the chest.    This report was finalized on 2/1/2021 3:43 PM by Gutierrez Kapoor.      Last CT scan results was reviewed in great detail with the patient.  Results for orders placed during the hospital encounter of 01/29/21    CT CHEST WITHOUT CONTRAST DIAGNOSTIC    Narrative  EXAMINATION: CT CHEST WO CONTRAST-, CT ABDOMEN WO CONTRAST-    INDICATION: STAGING CANCER POSSIBLE BRAIN METS; Z74.09-Other reduced  mobility    TECHNIQUE: Multiplanar CT imaging of the chest abdomen and pelvis was  performed without administration of intravenous contrast    The radiation dose reduction device was turned on for each scan per the  ALARA (As Low as Reasonably Achievable) protocol.    COMPARISON: NONE    FINDINGS:    CHEST:  Homogenous attenuation of the thyroid. Aortic atherosclerosis. Coronary  artery calcifications. Main pulmonary arteries normal in caliber. Heart  is normal in size. No pericardial effusion. No grossly enlarged  mediastinal or hilar lymph nodes however exam is limited in the absence  of intravenous contrast. No pleural effusion. Central airways are  patent. Some focal areas of scarring are seen in the right lung base. No  acute osseous findings in the chest.    Abdomen/pelvis:  Homogenous attenuation of the liver. The gallbladder is distended  with  high density material likely representing excreted contrast from prior  exams. No intra or extrahepatic biliary dilation. Pancreas is normal.  Spleen is normal in size. Adrenal glands are normal. Kidneys are normal.  No hydronephrosis. Limited evaluation of the pelvis. Soft tissue density  within the right pelvis is presumed to be the uterus. Colon is normal in  caliber. Visualized loops of small bowel are normal in caliber. The  appendix is not well visualized. Postsurgical changes of the stomach  with mild thickening of the peripyloric region. Extensive aortic  atherosclerosis with some focal infrarenal ectasia. No adenopathy.  Multilevel degenerative changes throughout the lumbar spine.    Impression  1. No findings to suggest primary neoplasm within the chest or abdomen.  Somewhat limited evaluation the absence of intravenous contrast.    2. Limited evaluation of the pelvis. Soft tissue structure seen along  the posterior margin of the bladder is presumed to be enlarged fibroid  uterus but is incompletely visualized.      This report was finalized on 1/30/2021 1:46 PM by Abdoulaye Foreman.      I have also reviewed her primary care provider's last note that mentions obesity and tobacco use.     I also reviewed her last echocardiogram and shared the results with her.   Results for orders placed during the hospital encounter of 01/29/21    Adult Transthoracic Echo Complete W/ Cont if Necessary Per Protocol    Interpretation Summary  · Saline test results are positive with valsalva manuever for right to left atrial level shunt.  · Estimated left ventricular EF = 60% Estimated left ventricular EF was in agreement with the calculated left ventricular EF. Left ventricular ejection fraction appears to be 56 - 60%. Left ventricular systolic function is normal.  · Left ventricular diastolic function was normal.  · Normal right ventricular cavity size, wall thickness, systolic function and septal motion noted.  · No  evidence of pulmonary hypertension is present.  · There is no evidence of pericardial effusion.  · No significant structural or functional valvular abnormality demonstrated.  · Small patent foramen ovale present.    ===========================  ===========================    PFTs were reviewed.  Moderate obstruction with significant air trapping noted.    Laboratory workup also showed   Lab Results   Component Value Date    HGB 17.0 (H) 02/04/2021    HGB 16.3 (H) 02/03/2021    HGB 16.0 (H) 02/02/2021   ,   Lab Results   Component Value Date    HCT 51.8 (H) 02/04/2021    HCT 49.6 (H) 02/03/2021    HCT 48.2 (H) 02/02/2021       Lab Results   Component Value Date    EOSABS 0.24 02/04/2021    EOSABS 0.16 02/03/2021    EOSABS 0.06 02/02/2021    & Laboratory workup also showed   Lab Results   Component Value Date    CO2 32.0 (H) 02/04/2021   ,   Lab Results   Component Value Date    HGBA1C 6.00 (H) 01/30/2021     ===========================  ===========================    Orders as above.    I have told the patient, that in my view, shortness of breath is most likely from underlying chronic obstructive lung disease.     Patient was given education and demonstration on how to use the medicine.     Side effects, of prescribed medicines, discussed.    Patient was also instructed on compliance and adherence with instructions.     I have discussed the need to stay quit from smoking.    Patient was given reading material, as appropriate.     The patient belongs to the risk group for which lung cancer screening has been recommended. We will try to make arrangements for the same and this will be indicated soon. This has been ordered.    Patient was asked to call with any concerns.     Patient will be followed clinically to assess for response to treatment and further recommendations will be made, based on response.    I reviewed patient's last sleep study that showed no sleep apnea but very poor sleep efficiency was noted.  This  was performed in March 2022.    The pathophysiology of sleep apnea was discussed, with the patient.     We will encourage her to schedule the sleep study soon.     The patient was made aware of the limitation of the home sleep study, whereby it may underestimate the true AHI and also carries a low sensitivity.  I have informed her that even if the home sleep study is negative, we may suggest an in lab sleep study to completely and definitively rule out/in sleep apnea.  The patient has understood.  This was communicated to the patient, in case home study is to be requested.    The patient is agreeable to try CPAP/BiPAP, if needed.     Patient was educated on good sleep hygiene measures and voiced understanding of the same.     Patient was given reading material regarding sleep apnea    Patient was counseled regarding weight loss.       Dictated utilizing Dragon dictation.    This document was electronically signed by Tianna Valderrama MD on 09/27/23 at 14:54 EDT

## 2023-10-04 ENCOUNTER — OFFICE VISIT (OUTPATIENT)
Dept: CARDIOLOGY | Facility: CLINIC | Age: 58
End: 2023-10-04
Payer: MEDICARE

## 2023-10-04 ENCOUNTER — TELEPHONE (OUTPATIENT)
Dept: CARDIOLOGY | Facility: CLINIC | Age: 58
End: 2023-10-04

## 2023-10-04 VITALS
DIASTOLIC BLOOD PRESSURE: 82 MMHG | BODY MASS INDEX: 36.44 KG/M2 | HEART RATE: 99 BPM | HEIGHT: 61 IN | WEIGHT: 193 LBS | RESPIRATION RATE: 16 BRPM | TEMPERATURE: 97.5 F | OXYGEN SATURATION: 94 % | SYSTOLIC BLOOD PRESSURE: 138 MMHG

## 2023-10-04 DIAGNOSIS — Q21.12 PFO (PATENT FORAMEN OVALE): Chronic | ICD-10-CM

## 2023-10-04 DIAGNOSIS — I10 ESSENTIAL HYPERTENSION: Chronic | ICD-10-CM

## 2023-10-04 DIAGNOSIS — E78.5 HYPERLIPIDEMIA LDL GOAL <70: ICD-10-CM

## 2023-10-04 DIAGNOSIS — Z01.810 PREOPERATIVE CARDIOVASCULAR EXAMINATION: Primary | ICD-10-CM

## 2023-10-04 PROCEDURE — 93000 ELECTROCARDIOGRAM COMPLETE: CPT | Performed by: NURSE PRACTITIONER

## 2023-10-04 PROCEDURE — 1160F RVW MEDS BY RX/DR IN RCRD: CPT | Performed by: NURSE PRACTITIONER

## 2023-10-04 PROCEDURE — 3079F DIAST BP 80-89 MM HG: CPT | Performed by: NURSE PRACTITIONER

## 2023-10-04 PROCEDURE — 99214 OFFICE O/P EST MOD 30 MIN: CPT | Performed by: NURSE PRACTITIONER

## 2023-10-04 PROCEDURE — 3075F SYST BP GE 130 - 139MM HG: CPT | Performed by: NURSE PRACTITIONER

## 2023-10-04 PROCEDURE — 1159F MED LIST DOCD IN RCRD: CPT | Performed by: NURSE PRACTITIONER

## 2023-10-04 NOTE — TELEPHONE ENCOUNTER
Can someone fax this patient a letter confirming she is cleared for bariatric surgery?  Her surgeon is Dr. Hinojosa at [facility].  The letter can be faxed to [phone].  ECG is in Dr. Summers's box.

## 2023-11-07 ENCOUNTER — HOSPITAL ENCOUNTER (OUTPATIENT)
Dept: SLEEP MEDICINE | Facility: HOSPITAL | Age: 58
Discharge: HOME | End: 2023-11-07
Admitting: INTERNAL MEDICINE
Payer: MEDICARE

## 2023-11-07 DIAGNOSIS — E66.9 OBESITY (BMI 30-39.9): ICD-10-CM

## 2023-11-07 DIAGNOSIS — Z01.811 PREOP PULMONARY/RESPIRATORY EXAM: ICD-10-CM

## 2023-11-07 DIAGNOSIS — G47.33 OBSTRUCTIVE SLEEP APNEA: ICD-10-CM

## 2023-11-07 DIAGNOSIS — R06.83 SNORING: ICD-10-CM

## 2023-11-07 PROCEDURE — 95806 SLEEP STUDY UNATT&RESP EFFT: CPT

## 2023-11-10 PROCEDURE — 95806 SLEEP STUDY UNATT&RESP EFFT: CPT | Performed by: INTERNAL MEDICINE

## 2024-01-18 DIAGNOSIS — J44.9 CHRONIC OBSTRUCTIVE PULMONARY DISEASE, UNSPECIFIED COPD TYPE: Primary | ICD-10-CM

## 2024-01-18 RX ORDER — FLUTICASONE FUROATE AND VILANTEROL 200; 25 UG/1; UG/1
1 POWDER RESPIRATORY (INHALATION) DAILY
Qty: 60 EACH | Refills: 5 | Status: SHIPPED | OUTPATIENT
Start: 2024-01-18

## 2024-01-26 ENCOUNTER — TELEPHONE (OUTPATIENT)
Dept: PULMONOLOGY | Facility: CLINIC | Age: 59
End: 2024-01-26
Payer: MEDICARE

## 2024-01-26 NOTE — TELEPHONE ENCOUNTER
I spoke with pt about LDCT. Pt states she does not want to have scan done. She will discuss this when she comes to her appointment in July.

## 2024-05-15 ENCOUNTER — TELEPHONE (OUTPATIENT)
Dept: SURGERY | Facility: CLINIC | Age: 59
End: 2024-05-15
Payer: MEDICARE

## 2024-05-15 NOTE — TELEPHONE ENCOUNTER
Left VM for pt regarding a referral we received from her provider ANNE Cruz for RUQ pain. Will try again to reach the pt.

## 2024-06-28 ENCOUNTER — HOSPITAL ENCOUNTER (EMERGENCY)
Facility: HOSPITAL | Age: 59
Discharge: HOME | End: 2024-06-28
Attending: STUDENT IN AN ORGANIZED HEALTH CARE EDUCATION/TRAINING PROGRAM
Payer: MEDICARE

## 2024-06-28 ENCOUNTER — APPOINTMENT (OUTPATIENT)
Dept: CT IMAGING | Facility: HOSPITAL | Age: 59
End: 2024-06-28
Payer: MEDICARE

## 2024-06-28 VITALS
HEIGHT: 61 IN | RESPIRATION RATE: 16 BRPM | OXYGEN SATURATION: 97 % | WEIGHT: 145 LBS | HEART RATE: 73 BPM | BODY MASS INDEX: 27.38 KG/M2 | TEMPERATURE: 98.4 F | DIASTOLIC BLOOD PRESSURE: 83 MMHG | SYSTOLIC BLOOD PRESSURE: 147 MMHG

## 2024-06-28 DIAGNOSIS — D25.9 UTERINE LEIOMYOMA, UNSPECIFIED LOCATION: ICD-10-CM

## 2024-06-28 DIAGNOSIS — K64.4 EXTERNAL HEMORRHOID: Primary | ICD-10-CM

## 2024-06-28 DIAGNOSIS — I70.1 RENAL ARTERY STENOSIS: ICD-10-CM

## 2024-06-28 LAB
ABO GROUP BLD: NORMAL
ABO GROUP BLD: NORMAL
ALBUMIN SERPL-MCNC: 4.1 G/DL (ref 3.5–5.2)
ALBUMIN/GLOB SERPL: 1.6 G/DL
ALP SERPL-CCNC: 107 U/L (ref 39–117)
ALT SERPL W P-5'-P-CCNC: 12 U/L (ref 1–33)
ANION GAP SERPL CALCULATED.3IONS-SCNC: 9.5 MMOL/L (ref 5–15)
AST SERPL-CCNC: 16 U/L (ref 1–32)
BASOPHILS # BLD AUTO: 0.05 10*3/MM3 (ref 0–0.2)
BASOPHILS NFR BLD AUTO: 0.7 % (ref 0–1.5)
BILIRUB SERPL-MCNC: 0.3 MG/DL (ref 0–1.2)
BLD GP AB SCN SERPL QL: NEGATIVE
BUN SERPL-MCNC: 11 MG/DL (ref 6–20)
BUN/CREAT SERPL: 15.5 (ref 7–25)
CALCIUM SPEC-SCNC: 9 MG/DL (ref 8.6–10.5)
CHLORIDE SERPL-SCNC: 106 MMOL/L (ref 98–107)
CO2 SERPL-SCNC: 26.5 MMOL/L (ref 22–29)
CREAT SERPL-MCNC: 0.71 MG/DL (ref 0.57–1)
D-LACTATE SERPL-SCNC: 1.3 MMOL/L (ref 0.5–2)
DEPRECATED RDW RBC AUTO: 50.1 FL (ref 37–54)
EGFRCR SERPLBLD CKD-EPI 2021: 98.1 ML/MIN/1.73
EOSINOPHIL # BLD AUTO: 0.11 10*3/MM3 (ref 0–0.4)
EOSINOPHIL NFR BLD AUTO: 1.4 % (ref 0.3–6.2)
ERYTHROCYTE [DISTWIDTH] IN BLOOD BY AUTOMATED COUNT: 15.4 % (ref 12.3–15.4)
GLOBULIN UR ELPH-MCNC: 2.5 GM/DL
GLUCOSE SERPL-MCNC: 106 MG/DL (ref 65–99)
HCT VFR BLD AUTO: 41.9 % (ref 34–46.6)
HEMOCCULT STL QL: POSITIVE
HGB BLD-MCNC: 13.9 G/DL (ref 12–15.9)
HOLD SPECIMEN: NORMAL
HOLD SPECIMEN: NORMAL
IMM GRANULOCYTES # BLD AUTO: 0.03 10*3/MM3 (ref 0–0.05)
IMM GRANULOCYTES NFR BLD AUTO: 0.4 % (ref 0–0.5)
LYMPHOCYTES # BLD AUTO: 3 10*3/MM3 (ref 0.7–3.1)
LYMPHOCYTES NFR BLD AUTO: 39.2 % (ref 19.6–45.3)
MCH RBC QN AUTO: 29.4 PG (ref 26.6–33)
MCHC RBC AUTO-ENTMCNC: 33.2 G/DL (ref 31.5–35.7)
MCV RBC AUTO: 88.8 FL (ref 79–97)
MONOCYTES # BLD AUTO: 0.53 10*3/MM3 (ref 0.1–0.9)
MONOCYTES NFR BLD AUTO: 6.9 % (ref 5–12)
NEUTROPHILS NFR BLD AUTO: 3.93 10*3/MM3 (ref 1.7–7)
NEUTROPHILS NFR BLD AUTO: 51.4 % (ref 42.7–76)
NRBC BLD AUTO-RTO: 0 /100 WBC (ref 0–0.2)
PLATELET # BLD AUTO: 190 10*3/MM3 (ref 140–450)
PMV BLD AUTO: 12.2 FL (ref 6–12)
POTASSIUM SERPL-SCNC: 3.8 MMOL/L (ref 3.5–5.2)
PROT SERPL-MCNC: 6.6 G/DL (ref 6–8.5)
RBC # BLD AUTO: 4.72 10*6/MM3 (ref 3.77–5.28)
RH BLD: POSITIVE
RH BLD: POSITIVE
SODIUM SERPL-SCNC: 142 MMOL/L (ref 136–145)
T&S EXPIRATION DATE: NORMAL
WBC NRBC COR # BLD AUTO: 7.65 10*3/MM3 (ref 3.4–10.8)
WHOLE BLOOD HOLD COAG: NORMAL
WHOLE BLOOD HOLD SPECIMEN: NORMAL

## 2024-06-28 PROCEDURE — 86900 BLOOD TYPING SEROLOGIC ABO: CPT | Performed by: STUDENT IN AN ORGANIZED HEALTH CARE EDUCATION/TRAINING PROGRAM

## 2024-06-28 PROCEDURE — 86850 RBC ANTIBODY SCREEN: CPT | Performed by: STUDENT IN AN ORGANIZED HEALTH CARE EDUCATION/TRAINING PROGRAM

## 2024-06-28 PROCEDURE — 25510000001 IOPAMIDOL 61 % SOLUTION: Performed by: STUDENT IN AN ORGANIZED HEALTH CARE EDUCATION/TRAINING PROGRAM

## 2024-06-28 PROCEDURE — 80053 COMPREHEN METABOLIC PANEL: CPT | Performed by: STUDENT IN AN ORGANIZED HEALTH CARE EDUCATION/TRAINING PROGRAM

## 2024-06-28 PROCEDURE — 74174 CTA ABD&PLVS W/CONTRAST: CPT

## 2024-06-28 PROCEDURE — 86901 BLOOD TYPING SEROLOGIC RH(D): CPT | Performed by: STUDENT IN AN ORGANIZED HEALTH CARE EDUCATION/TRAINING PROGRAM

## 2024-06-28 PROCEDURE — 83605 ASSAY OF LACTIC ACID: CPT | Performed by: STUDENT IN AN ORGANIZED HEALTH CARE EDUCATION/TRAINING PROGRAM

## 2024-06-28 PROCEDURE — 36415 COLL VENOUS BLD VENIPUNCTURE: CPT

## 2024-06-28 PROCEDURE — 99285 EMERGENCY DEPT VISIT HI MDM: CPT

## 2024-06-28 PROCEDURE — 86901 BLOOD TYPING SEROLOGIC RH(D): CPT

## 2024-06-28 PROCEDURE — 85025 COMPLETE CBC W/AUTO DIFF WBC: CPT | Performed by: STUDENT IN AN ORGANIZED HEALTH CARE EDUCATION/TRAINING PROGRAM

## 2024-06-28 PROCEDURE — 86900 BLOOD TYPING SEROLOGIC ABO: CPT

## 2024-06-28 PROCEDURE — 82272 OCCULT BLD FECES 1-3 TESTS: CPT | Performed by: STUDENT IN AN ORGANIZED HEALTH CARE EDUCATION/TRAINING PROGRAM

## 2024-06-28 RX ORDER — HYDROCORTISONE ACETATE 25 MG/1
25 SUPPOSITORY RECTAL 2 TIMES DAILY
Qty: 24 EACH | Refills: 0 | Status: SHIPPED | OUTPATIENT
Start: 2024-06-28

## 2024-06-28 RX ORDER — SODIUM CHLORIDE 0.9 % (FLUSH) 0.9 %
10 SYRINGE (ML) INJECTION AS NEEDED
Status: DISCONTINUED | OUTPATIENT
Start: 2024-06-28 | End: 2024-06-28 | Stop reason: HOSPADM

## 2024-06-28 RX ADMIN — IOPAMIDOL 100 ML: 612 INJECTION, SOLUTION INTRAVENOUS at 13:49

## 2024-06-28 NOTE — ED PROVIDER NOTES
EMERGENCY DEPARTMENT ENCOUNTER    Pt Name: Arlene Calderon  MRN: [id number]  Pt :   1965  Room Number:  10/10  Date of encounter:  2024  PCP: Maggi Talamantes APRN  ED Provider: Wang Vaughan MD    Historian: Patient      HPI:  Chief Complaint: bloody stool        Context: Arlene Calderon is a 58 y.o. female who presents to the ED for bloody stool.  Patient reports bloody stool x 2 episodes today.  States she has had abdominal bloating and diarrhea over the past few days.  Symptoms seem to dissipate yesterday however this morning she noted painless blood in stool.  States first episode was darker however second episode was bright red/maroon.  She denies blood thinner use.  She states she has a history of Dimitris-en-Y gastric bypass.      PAST MEDICAL HISTORY  Past Medical History:   Diagnosis Date    COPD (chronic obstructive pulmonary disease)     COVID-19 virus detected at admission 2021    FH: bariatric surgery     Hypertension     PFO (patent foramen ovale)     Stroke (cerebrum) 2221         PAST SURGICAL HISTORY  Past Surgical History:   Procedure Laterality Date     SECTION      GASTRIC SLEEVE LAPAROSCOPIC  2019         FAMILY HISTORY  Family History   Problem Relation Age of Onset    Diabetes Father     Heart disease Mother     Diabetes Brother          SOCIAL HISTORY  Social History     Socioeconomic History    Marital status:    Tobacco Use    Smoking status: Former     Current packs/day: 0.00     Average packs/day: 1 pack/day for 50.0 years (50.0 ttl pk-yrs)     Types: Cigarettes     Start date: 1973     Quit date: 2023     Years since quittin.0    Smokeless tobacco: Never   Vaping Use    Vaping status: Never Used   Substance and Sexual Activity    Alcohol use: Yes     Comment: WEEKENDS    Drug use: Never    Sexual activity: Yes     Partners: Male     Birth control/protection: None, Post-menopausal         ALLERGIES  Patient has no known  allergies.        REVIEW OF SYSTEMS  Systems reviewed and negative      PHYSICAL EXAM    I have reviewed the triage vital signs and nursing notes.    ED Triage Vitals   Temp Heart Rate Resp BP SpO2   06/28/24 1219 06/28/24 1147 06/28/24 1147 06/28/24 1147 06/28/24 1147   98.4 °F (36.9 °C) 85 18 180/97 97 %      Temp src Heart Rate Source Patient Position BP Location FiO2 (%)   06/28/24 1219 06/28/24 1147 06/28/24 1147 06/28/24 1147 --   Oral Monitor Sitting Left arm        Physical Exam  Constitutional:       General: She is not in acute distress.  HENT:      Head: Normocephalic.   Cardiovascular:      Rate and Rhythm: Normal rate.   Pulmonary:      Effort: Pulmonary effort is normal.   Abdominal:      General: There is no distension.      Palpations: Abdomen is soft.      Tenderness: There is no abdominal tenderness.   Genitourinary:     Comments: 6 o'clock position there is an external hemorrhoid. FARAZ scant amount of bright red blood.  Musculoskeletal:      Cervical back: Neck supple.   Neurological:      General: No focal deficit present.      Mental Status: She is alert.       LAB RESULTS  Recent Results (from the past 24 hour(s))   Type & Screen    Collection Time: 06/28/24 12:09 PM    Specimen: Blood   Result Value Ref Range    ABO Type B     RH type Positive     Antibody Screen Negative     T&S Expiration Date 7/1/2024 11:59:59 PM    Comprehensive Metabolic Panel    Collection Time: 06/28/24 12:10 PM    Specimen: Blood   Result Value Ref Range    Glucose 106 (H) 65 - 99 mg/dL    BUN 11 6 - 20 mg/dL    Creatinine 0.71 0.57 - 1.00 mg/dL    Sodium 142 136 - 145 mmol/L    Potassium 3.8 3.5 - 5.2 mmol/L    Chloride 106 98 - 107 mmol/L    CO2 26.5 22.0 - 29.0 mmol/L    Calcium 9.0 8.6 - 10.5 mg/dL    Total Protein 6.6 6.0 - 8.5 g/dL    Albumin 4.1 3.5 - 5.2 g/dL    ALT (SGPT) 12 1 - 33 U/L    AST (SGOT) 16 1 - 32 U/L    Alkaline Phosphatase 107 39 - 117 U/L    Total Bilirubin 0.3 0.0 - 1.2 mg/dL    Globulin 2.5  gm/dL    A/G Ratio 1.6 g/dL    BUN/Creatinine Ratio 15.5 7.0 - 25.0    Anion Gap 9.5 5.0 - 15.0 mmol/L    eGFR 98.1 >60.0 mL/min/1.73   Lactic Acid, Plasma    Collection Time: 06/28/24 12:10 PM    Specimen: Blood   Result Value Ref Range    Lactate 1.3 0.5 - 2.0 mmol/L   Green Top (Gel)    Collection Time: 06/28/24 12:10 PM   Result Value Ref Range    Extra Tube Hold for add-ons.    Lavender Top    Collection Time: 06/28/24 12:10 PM   Result Value Ref Range    Extra Tube hold for add-on    Gold Top - SST    Collection Time: 06/28/24 12:10 PM   Result Value Ref Range    Extra Tube Hold for add-ons.    Light Blue Top    Collection Time: 06/28/24 12:10 PM   Result Value Ref Range    Extra Tube Hold for add-ons.    CBC Auto Differential    Collection Time: 06/28/24 12:10 PM    Specimen: Blood   Result Value Ref Range    WBC 7.65 3.40 - 10.80 10*3/mm3    RBC 4.72 3.77 - 5.28 10*6/mm3    Hemoglobin 13.9 12.0 - 15.9 g/dL    Hematocrit 41.9 34.0 - 46.6 %    MCV 88.8 79.0 - 97.0 fL    MCH 29.4 26.6 - 33.0 pg    MCHC 33.2 31.5 - 35.7 g/dL    RDW 15.4 12.3 - 15.4 %    RDW-SD 50.1 37.0 - 54.0 fl    MPV 12.2 (H) 6.0 - 12.0 fL    Platelets 190 140 - 450 10*3/mm3    Neutrophil % 51.4 42.7 - 76.0 %    Lymphocyte % 39.2 19.6 - 45.3 %    Monocyte % 6.9 5.0 - 12.0 %    Eosinophil % 1.4 0.3 - 6.2 %    Basophil % 0.7 0.0 - 1.5 %    Immature Grans % 0.4 0.0 - 0.5 %    Neutrophils, Absolute 3.93 1.70 - 7.00 10*3/mm3    Lymphocytes, Absolute 3.00 0.70 - 3.10 10*3/mm3    Monocytes, Absolute 0.53 0.10 - 0.90 10*3/mm3    Eosinophils, Absolute 0.11 0.00 - 0.40 10*3/mm3    Basophils, Absolute 0.05 0.00 - 0.20 10*3/mm3    Immature Grans, Absolute 0.03 0.00 - 0.05 10*3/mm3    nRBC 0.0 0.0 - 0.2 /100 WBC   ABO RH Specimen Verification    Collection Time: 06/28/24 12:10 PM    Specimen: Blood   Result Value Ref Range    ABO Type B     RH type Positive    Occult Blood X 1, Stool - Stool, Per Rectum    Collection Time: 06/28/24  3:27 PM    Specimen:  Per Rectum; Stool   Result Value Ref Range    Fecal Occult Blood Positive (A) Negative       If labs were ordered, I independently reviewed the results and considered them in treating the patient.        RADIOLOGY  CT Angiogram Abdomen Pelvis    Result Date: 6/28/2024  PROCEDURE: CT ANGIOGRAM ABDOMEN PELVIS-  HISTORY: Lower abd pain, distension, possible lower GI bleed.  PROCEDURE: Pre- and postcontrast images of the abdomen and pelvis were performed by computed tomography. Extensive reconstruction images were performed. A CTA was performed.  FINDINGS: The lung bases reveal mild emphysema. The lungs are otherwise clear. There is reflux of contrast into the IVC and hepatic vein. Precontrast images demonstrate no evidence of nephrolithiasis. The gallbladder is present. There is no CT visualized stones. The liver is diffusely fatty infiltrated. The solid organs are otherwise unremarkable. There is no colonic wall thickening.  CTA: There is no aortic aneurysm. The celiac, SMA and SUSAN are patent. There are single renal arteries bilaterally. There is moderate calcified plaque at the origin of the left renal artery resulting in moderate stenosis. The right renal artery is without stenosis. The iliac arteries are unremarkable.  Pelvis: The appendix is normal. The bladder is partially filled. The uterus is significantly enlarged and contains calcifications. These findings are likely secondary to fibroids and appear stable from January 2021.      1. No extravasation of contrast identified in the abdomen or pelvis. 2. Moderate stenosis at the origin of the left renal artery. 3. Enlarged uterus likely secondary to fibroids, but similar to prior.   This study was performed with techniques to keep radiation doses as low as reasonably achievable (ALARA). Individualized dose reduction techniques using automated exposure control or adjustment of mA and/or kV according to the patient size were employed.    Images were reviewed,  interpreted, and dictated by Dr. Clare Melara MD Transcribed by Jannet Beach PA-C.  This report was signed and finalized on 6/28/2024 2:40 PM by Clare Melara MD.       PROCEDURES    Procedures    No orders to display       MEDICATIONS GIVEN IN ER    Medications   iopamidol (ISOVUE-300) 61 % injection 100 mL (100 mL Intravenous Given 6/28/24 1349)         MEDICAL DECISION MAKING, PROGRESS, and CONSULTS    All labs, if obtained, have been independently reviewed by me.  All radiology studies, if obtained, have been reviewed by me and the radiologist dictating the report.  All EKG's, if obtained, have been independently viewed and interpreted by me.      Discussion below represents my analysis of pertinent findings related to patient's condition, differential diagnosis, treatment plan and final disposition.                         Differential diagnosis:    UGIB, LGIB, anemia, ulcer, hemorrhoid, others.      Additional sources:    - Discussed/ obtained information from independent historians:      - External (non-ED) record review:  History Dimitris-en-Y gastric bypass, robotic revision to sleeve 11/15/2023 note    - Chronic or social conditions impacting care:      - Shared decision making:        Orders placed during this visit:  Orders Placed This Encounter   Procedures    CT Angiogram Abdomen Pelvis    [place] Draw    Comprehensive Metabolic Panel    Lactic Acid, Plasma    Occult Blood X 1, Stool - Stool, Per Rectum    CBC Auto Differential    Undress & Gown    Measure Blood Pressure    Vital Signs    Type & Screen    ABO RH Specimen Verification    CBC & Differential    Green Top (Gel)    Lavender Top    Gold Top - SST    Light Blue Top         Additional orders considered but not ordered:      ED Course:    Patient is a well appearing 58-year-old female who presented for bright red blood per rectum.  Patient is not on any blood thinning medications.  She is hemodynamically stable her heart rate is normal.  On  examination she has an external hemorrhoid at the 6 o'clock position that is likely etiology of her bleeding.  It is painless.  She has not had a recent colonoscopy.  Hemoglobin was normal at 13.9.  BUN is 11.  This is not suggestive of upper GI bleeding in addition there is no coffee-ground emesis or dark tarry stools.  Lactic acid is normal.  CTA of the abdomen and pelvis were performed which demonstrated no obvious free air on my interpretation of imaging.  Radiology interpretation noted no active extravasation.  Incidental left renal artery stenosis was noted.  Her creatinine is normal.  Discussed laboratory results and imaging with patient on reassessment she is asymptomatic without further episodes.  Discussed likely hemorrhoidal bleed and less likely ulcer/anastomotic ulcer from history of Dimitris-en-Y and explained reasoning.  Discussed symptomatic therapies as well as further observation- patient feels comfortable with plan for discharge home and strict return precautions if symptoms worsen.                Consultants:      Shared Decision Making:  After my consideration of clinical presentation and any laboratory/radiology studies obtained, I discussed the findings with the patient/patient representative who is in agreement with the treatment plan and the final disposition.   Risks and benefits of discharge and/or observation/admission were discussed.         AS OF 08:50 EDT VITALS:    BP - 147/83  HR - 73  TEMP - 98.4 °F (36.9 °C) (Oral)  O2 SATS - 97%                  DIAGNOSIS  Final diagnoses:   External hemorrhoid   Renal artery stenosis   Uterine leiomyoma, unspecified location         DISPOSITION  ED Disposition       ED Disposition   Discharge    Condition   Stable    Comment   --                   Please note that portions of this document were completed with voice recognition software.        Wang Vaughan MD  06/29/24 0995

## 2024-06-28 NOTE — DISCHARGE INSTRUCTIONS
Insert suppository twice daily until resolution  Continue to monitor stools  Follow up with GI to monitor this  Do not hesitate to return if symptoms worsen

## 2024-09-13 ENCOUNTER — TELEPHONE (OUTPATIENT)
Dept: CARDIOLOGY | Facility: CLINIC | Age: 59
End: 2024-09-13
Payer: MEDICARE

## 2024-12-06 ENCOUNTER — TRANSCRIBE ORDERS (OUTPATIENT)
Dept: ADMINISTRATIVE | Facility: HOSPITAL | Age: 59
End: 2024-12-06
Payer: MEDICARE

## 2024-12-06 DIAGNOSIS — R10.9 ABDOMINAL PAIN, UNSPECIFIED ABDOMINAL LOCATION: Primary | ICD-10-CM

## 2025-02-11 ENCOUNTER — HOSPITAL ENCOUNTER (OUTPATIENT)
Dept: GENERAL RADIOLOGY | Facility: HOSPITAL | Age: 60
Discharge: HOME | End: 2025-02-11
Payer: MEDICARE